# Patient Record
Sex: FEMALE | URBAN - METROPOLITAN AREA
[De-identification: names, ages, dates, MRNs, and addresses within clinical notes are randomized per-mention and may not be internally consistent; named-entity substitution may affect disease eponyms.]

---

## 2018-07-19 ENCOUNTER — APPOINTMENT (RX ONLY)
Dept: URBAN - METROPOLITAN AREA OTHER 12 | Facility: OTHER | Age: 58
Setting detail: DERMATOLOGY
End: 2018-07-19

## 2018-07-19 DIAGNOSIS — L21.8 OTHER SEBORRHEIC DERMATITIS: ICD-10-CM

## 2018-07-19 PROCEDURE — ? TREATMENT REGIMEN

## 2018-07-19 PROCEDURE — ? PRESCRIPTION

## 2018-07-19 PROCEDURE — 99212 OFFICE O/P EST SF 10 MIN: CPT

## 2018-07-19 RX ORDER — PIMECROLIMUS 10 MG/G
CREAM TOPICAL
Qty: 1 | Refills: 3 | Status: ERX | COMMUNITY
Start: 2018-07-19

## 2018-07-19 RX ADMIN — PIMECROLIMUS: 10 CREAM TOPICAL at 17:25

## 2018-07-19 NOTE — PROCEDURE: TREATMENT REGIMEN
Continue Regimen: Hydrocortisone cream until Elidel cream is received, then discontinue
Otc Regimen: Free & Clear products 2-3 x weekly and CeraVe cream BID
Plan: Advised to contact clinic for referral to [facility] if the rash that Dr. Zapien evaluated returns
Detail Level: Zone

## 2020-09-14 ENCOUNTER — IMPORTED ENCOUNTER (OUTPATIENT)
Dept: URBAN - METROPOLITAN AREA CLINIC 38 | Facility: CLINIC | Age: 60
End: 2020-09-14

## 2020-09-14 PROBLEM — H53.001 UNSPECIFIED AMBLYOPIA, RIGHT EYE: Noted: 2020-09-14

## 2020-09-14 PROBLEM — H52.4 PRESBYOPIA: Noted: 2020-09-14

## 2020-09-14 PROBLEM — H25.813 COMBINED FORMS OF AGE-RELATED CATARACT, BILATERAL: Noted: 2020-09-14

## 2020-09-14 PROBLEM — H50.10 UNSPECIFIED EXOTROPIA: Noted: 2020-09-14

## 2020-09-30 ENCOUNTER — IMPORTED ENCOUNTER (OUTPATIENT)
Dept: URBAN - METROPOLITAN AREA CLINIC 38 | Facility: CLINIC | Age: 60
End: 2020-09-30

## 2020-09-30 PROBLEM — H50.111 MONOCULAR EXOTROPIA, RIGHT EYE: Noted: 2020-09-30

## 2020-09-30 PROCEDURE — 92012 INTRM OPH EXAM EST PATIENT: CPT

## 2021-01-01 ENCOUNTER — APPOINTMENT (OUTPATIENT)
Dept: GENERAL RADIOLOGY | Age: 61
DRG: 871 | End: 2021-01-01
Attending: EMERGENCY MEDICINE
Payer: MEDICARE

## 2021-01-01 ENCOUNTER — APPOINTMENT (OUTPATIENT)
Dept: GENERAL RADIOLOGY | Age: 61
DRG: 871 | End: 2021-01-01
Attending: INTERNAL MEDICINE
Payer: MEDICARE

## 2021-01-01 ENCOUNTER — APPOINTMENT (OUTPATIENT)
Dept: CT IMAGING | Age: 61
DRG: 871 | End: 2021-01-01
Attending: EMERGENCY MEDICINE
Payer: MEDICARE

## 2021-01-01 ENCOUNTER — APPOINTMENT (OUTPATIENT)
Dept: NON INVASIVE DIAGNOSTICS | Age: 61
DRG: 871 | End: 2021-01-01
Attending: INTERNAL MEDICINE
Payer: MEDICARE

## 2021-01-01 ENCOUNTER — HOSPITAL ENCOUNTER (INPATIENT)
Age: 61
LOS: 8 days | DRG: 871 | End: 2021-07-22
Attending: EMERGENCY MEDICINE | Admitting: HOSPITALIST
Payer: MEDICARE

## 2021-01-01 VITALS
SYSTOLIC BLOOD PRESSURE: 149 MMHG | HEART RATE: 136 BPM | WEIGHT: 82 LBS | OXYGEN SATURATION: 94 % | TEMPERATURE: 97.3 F | DIASTOLIC BLOOD PRESSURE: 77 MMHG | RESPIRATION RATE: 24 BRPM | HEIGHT: 63 IN | BODY MASS INDEX: 14.53 KG/M2

## 2021-01-01 DIAGNOSIS — R56.9 SEIZURE (HCC): ICD-10-CM

## 2021-01-01 DIAGNOSIS — G40.901 STATUS EPILEPTICUS (HCC): ICD-10-CM

## 2021-01-01 DIAGNOSIS — J96.01 ACUTE RESPIRATORY FAILURE WITH HYPOXIA (HCC): ICD-10-CM

## 2021-01-01 DIAGNOSIS — R65.21 SEPTIC SHOCK (HCC): Primary | ICD-10-CM

## 2021-01-01 DIAGNOSIS — J18.9 PNEUMONIA OF BOTH LUNGS DUE TO INFECTIOUS ORGANISM, UNSPECIFIED PART OF LUNG: ICD-10-CM

## 2021-01-01 DIAGNOSIS — A41.9 SEPTIC SHOCK (HCC): Primary | ICD-10-CM

## 2021-01-01 DIAGNOSIS — R53.81 DEBILITY: ICD-10-CM

## 2021-01-01 DIAGNOSIS — E87.20 LACTIC ACIDOSIS: ICD-10-CM

## 2021-01-01 DIAGNOSIS — Z71.89 GOALS OF CARE, COUNSELING/DISCUSSION: ICD-10-CM

## 2021-01-01 DIAGNOSIS — A41.9 SEPSIS, DUE TO UNSPECIFIED ORGANISM, UNSPECIFIED WHETHER ACUTE ORGAN DYSFUNCTION PRESENT (HCC): ICD-10-CM

## 2021-01-01 LAB
ALBUMIN SERPL-MCNC: 2.7 G/DL (ref 3.4–5)
ALBUMIN/GLOB SERPL: 0.4 {RATIO} (ref 0.8–1.7)
ALP SERPL-CCNC: 115 U/L (ref 45–117)
ALT SERPL-CCNC: 12 U/L (ref 13–56)
ANION GAP BLD CALC-SCNC: 13 MMOL/L (ref 10–20)
ANION GAP SERPL CALC-SCNC: 10 MMOL/L (ref 3–18)
ANION GAP SERPL CALC-SCNC: 11 MMOL/L (ref 3–18)
ANION GAP SERPL CALC-SCNC: 12 MMOL/L (ref 3–18)
ANION GAP SERPL CALC-SCNC: 5 MMOL/L (ref 3–18)
ANION GAP SERPL CALC-SCNC: 6 MMOL/L (ref 3–18)
ANION GAP SERPL CALC-SCNC: 7 MMOL/L (ref 3–18)
ANION GAP SERPL CALC-SCNC: 9 MMOL/L (ref 3–18)
AST SERPL-CCNC: 26 U/L (ref 10–38)
ATRIAL RATE: 147 BPM
ATRIAL RATE: 162 BPM
ATRIAL RATE: 185 BPM
BACTERIA SPEC CULT: NORMAL
BASE DEFICIT BLD-SCNC: 0.2 MMOL/L
BASOPHILS # BLD: 0 K/UL (ref 0–0.1)
BASOPHILS NFR BLD: 0 % (ref 0–2)
BILIRUB SERPL-MCNC: 0.6 MG/DL (ref 0.2–1)
BNP SERPL-MCNC: 6289 PG/ML (ref 0–900)
BUN SERPL-MCNC: 12 MG/DL (ref 7–18)
BUN SERPL-MCNC: 14 MG/DL (ref 7–18)
BUN SERPL-MCNC: 15 MG/DL (ref 7–18)
BUN SERPL-MCNC: 18 MG/DL (ref 7–18)
BUN SERPL-MCNC: 6 MG/DL (ref 7–18)
BUN SERPL-MCNC: 7 MG/DL (ref 7–18)
BUN SERPL-MCNC: 7 MG/DL (ref 7–18)
BUN SERPL-MCNC: 8 MG/DL (ref 7–18)
BUN SERPL-MCNC: 8 MG/DL (ref 7–18)
BUN/CREAT SERPL: 21 (ref 12–20)
BUN/CREAT SERPL: 26 (ref 12–20)
BUN/CREAT SERPL: 29 (ref 12–20)
BUN/CREAT SERPL: 30 (ref 12–20)
BUN/CREAT SERPL: 35 (ref 12–20)
BUN/CREAT SERPL: 35 (ref 12–20)
BUN/CREAT SERPL: 43 (ref 12–20)
BUN/CREAT SERPL: 50 (ref 12–20)
BUN/CREAT SERPL: 56 (ref 12–20)
CA-I BLD-MCNC: 1.24 MMOL/L (ref 1.12–1.32)
CA-I SERPL-SCNC: 1.17 MMOL/L (ref 1.12–1.32)
CA-I SERPL-SCNC: 1.19 MMOL/L (ref 1.12–1.32)
CALCIUM SERPL-MCNC: 10.3 MG/DL (ref 8.5–10.1)
CALCIUM SERPL-MCNC: 7.8 MG/DL (ref 8.5–10.1)
CALCIUM SERPL-MCNC: 7.8 MG/DL (ref 8.5–10.1)
CALCIUM SERPL-MCNC: 7.9 MG/DL (ref 8.5–10.1)
CALCIUM SERPL-MCNC: 8.2 MG/DL (ref 8.5–10.1)
CALCIUM SERPL-MCNC: 8.2 MG/DL (ref 8.5–10.1)
CALCIUM SERPL-MCNC: 8.3 MG/DL (ref 8.5–10.1)
CALCIUM SERPL-MCNC: 8.3 MG/DL (ref 8.5–10.1)
CALCIUM SERPL-MCNC: 8.9 MG/DL (ref 8.5–10.1)
CALCULATED P AXIS, ECG09: 67 DEGREES
CALCULATED P AXIS, ECG09: 67 DEGREES
CALCULATED R AXIS, ECG10: -103 DEGREES
CALCULATED R AXIS, ECG10: -117 DEGREES
CALCULATED R AXIS, ECG10: -120 DEGREES
CALCULATED T AXIS, ECG11: 64 DEGREES
CALCULATED T AXIS, ECG11: 72 DEGREES
CALCULATED T AXIS, ECG11: 88 DEGREES
CHLORIDE BLD-SCNC: 93 MMOL/L (ref 98–107)
CHLORIDE SERPL-SCNC: 101 MMOL/L (ref 100–111)
CHLORIDE SERPL-SCNC: 102 MMOL/L (ref 100–111)
CHLORIDE SERPL-SCNC: 104 MMOL/L (ref 100–111)
CHLORIDE SERPL-SCNC: 106 MMOL/L (ref 100–111)
CHLORIDE SERPL-SCNC: 106 MMOL/L (ref 100–111)
CHLORIDE SERPL-SCNC: 108 MMOL/L (ref 100–111)
CHLORIDE SERPL-SCNC: 111 MMOL/L (ref 100–111)
CHLORIDE SERPL-SCNC: 94 MMOL/L (ref 100–111)
CHLORIDE SERPL-SCNC: 99 MMOL/L (ref 100–111)
CK MB CFR SERPL CALC: 4 % (ref 0–4)
CK MB CFR SERPL CALC: 5.5 % (ref 0–4)
CK MB CFR SERPL CALC: 7.3 % (ref 0–4)
CK MB SERPL-MCNC: 2.6 NG/ML (ref 5–25)
CK MB SERPL-MCNC: 2.7 NG/ML (ref 5–25)
CK MB SERPL-MCNC: 3.1 NG/ML (ref 5–25)
CK SERPL-CCNC: 37 U/L (ref 26–192)
CK SERPL-CCNC: 47 U/L (ref 26–192)
CK SERPL-CCNC: 78 U/L (ref 26–192)
CO2 BLD-SCNC: 27 MMOL/L (ref 19–24)
CO2 SERPL-SCNC: 22 MMOL/L (ref 21–32)
CO2 SERPL-SCNC: 22 MMOL/L (ref 21–32)
CO2 SERPL-SCNC: 23 MMOL/L (ref 21–32)
CO2 SERPL-SCNC: 23 MMOL/L (ref 21–32)
CO2 SERPL-SCNC: 24 MMOL/L (ref 21–32)
CO2 SERPL-SCNC: 24 MMOL/L (ref 21–32)
CO2 SERPL-SCNC: 26 MMOL/L (ref 21–32)
CO2 SERPL-SCNC: 28 MMOL/L (ref 21–32)
CO2 SERPL-SCNC: 29 MMOL/L (ref 21–32)
COVID-19 RAPID TEST, COVR: NOT DETECTED
CREAT BLD-MCNC: 0.73 MG/DL (ref 0.6–1.3)
CREAT SERPL-MCNC: 0.17 MG/DL (ref 0.6–1.3)
CREAT SERPL-MCNC: 0.23 MG/DL (ref 0.6–1.3)
CREAT SERPL-MCNC: 0.24 MG/DL (ref 0.6–1.3)
CREAT SERPL-MCNC: 0.25 MG/DL (ref 0.6–1.3)
CREAT SERPL-MCNC: 0.27 MG/DL (ref 0.6–1.3)
CREAT SERPL-MCNC: 0.28 MG/DL (ref 0.6–1.3)
CREAT SERPL-MCNC: 0.3 MG/DL (ref 0.6–1.3)
CREAT SERPL-MCNC: 0.34 MG/DL (ref 0.6–1.3)
CREAT SERPL-MCNC: 0.68 MG/DL (ref 0.6–1.3)
DATE LAST DOSE: ABNORMAL
DIAGNOSIS, 93000: NORMAL
DIFFERENTIAL METHOD BLD: ABNORMAL
ECHO AV ANNULUS DIAM: 3.32 CM
ECHO AV AREA PEAK VELOCITY: 2.54 CM2
ECHO AV AREA VTI: 2.17 CM2
ECHO AV AREA/BSA PEAK VELOCITY: 1.9 CM2/M2
ECHO AV AREA/BSA VTI: 1.7 CM2/M2
ECHO AV MEAN GRADIENT: 1.87 MMHG
ECHO AV PEAK GRADIENT: 3.44 MMHG
ECHO AV PEAK VELOCITY: 93 CM/S
ECHO AV VTI: 15.24 CM
ECHO LA VOL 4C: 30.09 ML (ref 22–52)
ECHO LV E' LATERAL VELOCITY: 8 CM/S
ECHO LV E' SEPTAL VELOCITY: 6 CM/S
ECHO LV EDV A2C: 38 ML
ECHO LV EDV A4C: 57 ML
ECHO LV EDV BP: 47.33 ML (ref 56–104)
ECHO LV EJECTION FRACTION A2C: 55 %
ECHO LV EJECTION FRACTION A4C: 60 %
ECHO LV EJECTION FRACTION BIPLANE: 57.9 % (ref 55–100)
ECHO LV ESV A2C: 16.99 ML
ECHO LV ESV A4C: 23.08 ML
ECHO LV ESV BP: 19.95 ML (ref 19–49)
ECHO LV INTERNAL DIMENSION DIASTOLIC: 3.7 CM (ref 3.9–5.3)
ECHO LV INTERNAL DIMENSION SYSTOLIC: 2.83 CM
ECHO LV IVSD: 0.92 CM (ref 0.6–0.9)
ECHO LV MASS 2D: 98.4 G (ref 67–162)
ECHO LV MASS INDEX 2D: 75.1 G/M2 (ref 43–95)
ECHO LV POSTERIOR WALL DIASTOLIC: 0.9 CM (ref 0.6–0.9)
ECHO LVOT CARDIAC OUTPUT: 3.7 L/MIN
ECHO LVOT DIAM: 2.01 CM
ECHO LVOT PEAK GRADIENT: 2.2 MMHG
ECHO LVOT PEAK VELOCITY: 74 CM/S
ECHO LVOT SV: 21 ML
ECHO LVOT SV: 33 ML
ECHO LVOT VTI: 10.41 CM
ECHO MV A VELOCITY: 74 CM/S
ECHO MV AREA PHT: 5.16 CM2
ECHO MV E DECELERATION TIME (DT): 147.08 MS
ECHO MV E VELOCITY: 55 CM/S
ECHO MV E/A RATIO: 0.74
ECHO MV E/E' LATERAL: 6.88
ECHO MV E/E' RATIO (AVERAGED): 8.02
ECHO MV E/E' SEPTAL: 9.17
ECHO MV PRESSURE HALF TIME (PHT): 42.65 MS
ECHO RA AREA 4C: 7.72 CM2
ECHO RV INTERNAL DIMENSION: 3.05 CM
ECHO RV TAPSE: 1.28 CM (ref 1.5–2)
EOSINOPHIL # BLD: 0 K/UL (ref 0–0.4)
EOSINOPHIL # BLD: 0.4 K/UL (ref 0–0.4)
EOSINOPHIL # BLD: 0.4 K/UL (ref 0–0.4)
EOSINOPHIL # BLD: 0.6 K/UL (ref 0–0.4)
EOSINOPHIL NFR BLD: 0 % (ref 0–5)
EOSINOPHIL NFR BLD: 2 % (ref 0–5)
EOSINOPHIL NFR BLD: 2 % (ref 0–5)
EOSINOPHIL NFR BLD: 3 % (ref 0–5)
ERYTHROCYTE [DISTWIDTH] IN BLOOD BY AUTOMATED COUNT: 15.7 % (ref 11.6–14.5)
ERYTHROCYTE [DISTWIDTH] IN BLOOD BY AUTOMATED COUNT: 15.9 % (ref 11.6–14.5)
ERYTHROCYTE [DISTWIDTH] IN BLOOD BY AUTOMATED COUNT: 15.9 % (ref 11.6–14.5)
ERYTHROCYTE [DISTWIDTH] IN BLOOD BY AUTOMATED COUNT: 16 % (ref 11.6–14.5)
ERYTHROCYTE [DISTWIDTH] IN BLOOD BY AUTOMATED COUNT: 16.1 % (ref 11.6–14.5)
ERYTHROCYTE [DISTWIDTH] IN BLOOD BY AUTOMATED COUNT: 16.7 % (ref 11.6–14.5)
ERYTHROCYTE [DISTWIDTH] IN BLOOD BY AUTOMATED COUNT: 16.9 % (ref 11.6–14.5)
ERYTHROCYTE [DISTWIDTH] IN BLOOD BY AUTOMATED COUNT: 17.2 % (ref 11.6–14.5)
EST. AVERAGE GLUCOSE BLD GHB EST-MCNC: 131 MG/DL
GLOBULIN SER CALC-MCNC: 6.8 G/DL (ref 2–4)
GLUCOSE BLD STRIP.AUTO-MCNC: 105 MG/DL (ref 70–110)
GLUCOSE BLD STRIP.AUTO-MCNC: 109 MG/DL (ref 70–110)
GLUCOSE BLD STRIP.AUTO-MCNC: 110 MG/DL (ref 70–110)
GLUCOSE BLD STRIP.AUTO-MCNC: 114 MG/DL (ref 70–110)
GLUCOSE BLD STRIP.AUTO-MCNC: 114 MG/DL (ref 70–110)
GLUCOSE BLD STRIP.AUTO-MCNC: 116 MG/DL (ref 70–110)
GLUCOSE BLD STRIP.AUTO-MCNC: 117 MG/DL (ref 70–110)
GLUCOSE BLD STRIP.AUTO-MCNC: 125 MG/DL (ref 70–110)
GLUCOSE BLD STRIP.AUTO-MCNC: 129 MG/DL (ref 70–110)
GLUCOSE BLD STRIP.AUTO-MCNC: 130 MG/DL (ref 70–110)
GLUCOSE BLD STRIP.AUTO-MCNC: 135 MG/DL (ref 70–110)
GLUCOSE BLD STRIP.AUTO-MCNC: 147 MG/DL (ref 70–110)
GLUCOSE BLD STRIP.AUTO-MCNC: 151 MG/DL (ref 70–110)
GLUCOSE BLD STRIP.AUTO-MCNC: 158 MG/DL (ref 70–110)
GLUCOSE BLD STRIP.AUTO-MCNC: 161 MG/DL (ref 70–110)
GLUCOSE BLD STRIP.AUTO-MCNC: 165 MG/DL (ref 70–110)
GLUCOSE BLD STRIP.AUTO-MCNC: 166 MG/DL (ref 70–110)
GLUCOSE BLD STRIP.AUTO-MCNC: 172 MG/DL (ref 70–110)
GLUCOSE BLD STRIP.AUTO-MCNC: 187 MG/DL (ref 70–110)
GLUCOSE BLD STRIP.AUTO-MCNC: 191 MG/DL (ref 70–110)
GLUCOSE BLD STRIP.AUTO-MCNC: 197 MG/DL (ref 70–110)
GLUCOSE BLD STRIP.AUTO-MCNC: 233 MG/DL (ref 70–110)
GLUCOSE BLD-MCNC: 222 MG/DL (ref 65–100)
GLUCOSE SERPL-MCNC: 101 MG/DL (ref 74–99)
GLUCOSE SERPL-MCNC: 113 MG/DL (ref 74–99)
GLUCOSE SERPL-MCNC: 118 MG/DL (ref 74–99)
GLUCOSE SERPL-MCNC: 150 MG/DL (ref 74–99)
GLUCOSE SERPL-MCNC: 157 MG/DL (ref 74–99)
GLUCOSE SERPL-MCNC: 170 MG/DL (ref 74–99)
GLUCOSE SERPL-MCNC: 176 MG/DL (ref 74–99)
GLUCOSE SERPL-MCNC: 182 MG/DL (ref 74–99)
GLUCOSE SERPL-MCNC: 99 MG/DL (ref 74–99)
HBA1C MFR BLD: 6.2 % (ref 4.2–5.6)
HCO3 BLD-SCNC: 26.1 MMOL/L (ref 22–26)
HCT VFR BLD AUTO: 25 % (ref 35–45)
HCT VFR BLD AUTO: 28.6 % (ref 35–45)
HCT VFR BLD AUTO: 29.4 % (ref 35–45)
HCT VFR BLD AUTO: 29.6 % (ref 35–45)
HCT VFR BLD AUTO: 30.9 % (ref 35–45)
HCT VFR BLD AUTO: 33.4 % (ref 35–45)
HCT VFR BLD AUTO: 33.6 % (ref 35–45)
HCT VFR BLD AUTO: 42 % (ref 35–45)
HGB BLD-MCNC: 10.1 G/DL (ref 12–16)
HGB BLD-MCNC: 10.4 G/DL (ref 12–16)
HGB BLD-MCNC: 12.9 G/DL (ref 12–16)
HGB BLD-MCNC: 7.9 G/DL (ref 12–16)
HGB BLD-MCNC: 8.6 G/DL (ref 12–16)
HGB BLD-MCNC: 9.3 G/DL (ref 12–16)
HGB BLD-MCNC: 9.4 G/DL (ref 12–16)
HGB BLD-MCNC: 9.6 G/DL (ref 12–16)
INR PPP: 1.3 (ref 0.8–1.2)
LACTATE BLD-SCNC: 11.57 MMOL/L (ref 0.4–2)
LACTATE BLD-SCNC: 4.89 MMOL/L (ref 0.4–2)
LACTATE SERPL-SCNC: 1 MMOL/L (ref 0.4–2)
LACTATE SERPL-SCNC: 1.4 MMOL/L (ref 0.4–2)
LACTATE SERPL-SCNC: 3.5 MMOL/L (ref 0.4–2)
LVOT MG: 1.09 MMHG
LYMPHOCYTES # BLD: 0.4 K/UL (ref 0.9–3.6)
LYMPHOCYTES # BLD: 0.5 K/UL (ref 0.9–3.6)
LYMPHOCYTES # BLD: 0.6 K/UL (ref 0.9–3.6)
LYMPHOCYTES # BLD: 0.8 K/UL (ref 0.9–3.6)
LYMPHOCYTES # BLD: 1 K/UL (ref 0.9–3.6)
LYMPHOCYTES # BLD: 1.5 K/UL (ref 0.9–3.6)
LYMPHOCYTES # BLD: 1.8 K/UL (ref 0.9–3.6)
LYMPHOCYTES NFR BLD: 2 % (ref 21–52)
LYMPHOCYTES NFR BLD: 2 % (ref 21–52)
LYMPHOCYTES NFR BLD: 3 % (ref 21–52)
LYMPHOCYTES NFR BLD: 4 % (ref 21–52)
LYMPHOCYTES NFR BLD: 5 % (ref 21–52)
LYMPHOCYTES NFR BLD: 9 % (ref 21–52)
LYMPHOCYTES NFR BLD: 9 % (ref 21–52)
MAGNESIUM SERPL-MCNC: 1.8 MG/DL (ref 1.6–2.6)
MAGNESIUM SERPL-MCNC: 1.9 MG/DL (ref 1.6–2.6)
MAGNESIUM SERPL-MCNC: 2 MG/DL (ref 1.6–2.6)
MAGNESIUM SERPL-MCNC: 2 MG/DL (ref 1.6–2.6)
MAGNESIUM SERPL-MCNC: 2.1 MG/DL (ref 1.6–2.6)
MAGNESIUM SERPL-MCNC: 2.3 MG/DL (ref 1.6–2.6)
MCH RBC QN AUTO: 27 PG (ref 24–34)
MCH RBC QN AUTO: 27.2 PG (ref 24–34)
MCH RBC QN AUTO: 27.7 PG (ref 24–34)
MCH RBC QN AUTO: 27.8 PG (ref 24–34)
MCH RBC QN AUTO: 27.9 PG (ref 24–34)
MCH RBC QN AUTO: 27.9 PG (ref 24–34)
MCH RBC QN AUTO: 28.2 PG (ref 24–34)
MCH RBC QN AUTO: 28.4 PG (ref 24–34)
MCHC RBC AUTO-ENTMCNC: 30.1 G/DL (ref 31–37)
MCHC RBC AUTO-ENTMCNC: 30.2 G/DL (ref 31–37)
MCHC RBC AUTO-ENTMCNC: 30.7 G/DL (ref 31–37)
MCHC RBC AUTO-ENTMCNC: 31 G/DL (ref 31–37)
MCHC RBC AUTO-ENTMCNC: 31.1 G/DL (ref 31–37)
MCHC RBC AUTO-ENTMCNC: 31.4 G/DL (ref 31–37)
MCHC RBC AUTO-ENTMCNC: 31.6 G/DL (ref 31–37)
MCHC RBC AUTO-ENTMCNC: 32 G/DL (ref 31–37)
MCV RBC AUTO: 88.3 FL (ref 74–97)
MCV RBC AUTO: 88.8 FL (ref 74–97)
MCV RBC AUTO: 89.6 FL (ref 74–97)
MCV RBC AUTO: 89.7 FL (ref 74–97)
MCV RBC AUTO: 89.9 FL (ref 74–97)
MCV RBC AUTO: 90 FL (ref 74–97)
MCV RBC AUTO: 90.1 FL (ref 74–97)
MCV RBC AUTO: 90.3 FL (ref 74–97)
MONOCYTES # BLD: 0 K/UL (ref 0.05–1.2)
MONOCYTES # BLD: 0.2 K/UL (ref 0.05–1.2)
MONOCYTES # BLD: 0.2 K/UL (ref 0.05–1.2)
MONOCYTES # BLD: 0.5 K/UL (ref 0.05–1.2)
MONOCYTES # BLD: 0.6 K/UL (ref 0.05–1.2)
MONOCYTES NFR BLD: 0 % (ref 3–10)
MONOCYTES NFR BLD: 1 % (ref 3–10)
MONOCYTES NFR BLD: 1 % (ref 3–10)
MONOCYTES NFR BLD: 3 % (ref 3–10)
NEUTS BAND NFR BLD MANUAL: 1 % (ref 0–5)
NEUTS BAND NFR BLD MANUAL: 11 % (ref 0–5)
NEUTS BAND NFR BLD MANUAL: 14 % (ref 0–5)
NEUTS BAND NFR BLD MANUAL: 23 % (ref 0–5)
NEUTS BAND NFR BLD MANUAL: 6 % (ref 0–5)
NEUTS BAND NFR BLD MANUAL: 6 % (ref 0–5)
NEUTS BAND NFR BLD MANUAL: 7 % (ref 0–5)
NEUTS SEG # BLD: 15.1 K/UL (ref 1.8–8)
NEUTS SEG # BLD: 16.8 K/UL (ref 1.8–8)
NEUTS SEG # BLD: 17.1 K/UL (ref 1.8–8)
NEUTS SEG # BLD: 18.8 K/UL (ref 1.8–8)
NEUTS SEG # BLD: 18.9 K/UL (ref 1.8–8)
NEUTS SEG # BLD: 21.6 K/UL (ref 1.8–8)
NEUTS SEG # BLD: 24.1 K/UL (ref 1.8–8)
NEUTS SEG NFR BLD: 69 % (ref 40–73)
NEUTS SEG NFR BLD: 74 % (ref 40–73)
NEUTS SEG NFR BLD: 84 % (ref 40–73)
NEUTS SEG NFR BLD: 85 % (ref 40–73)
NEUTS SEG NFR BLD: 86 % (ref 40–73)
NEUTS SEG NFR BLD: 88 % (ref 40–73)
NEUTS SEG NFR BLD: 91 % (ref 40–73)
P-R INTERVAL, ECG05: 104 MS
P-R INTERVAL, ECG05: 96 MS
PCO2 BLDV: 46.9 MMHG (ref 41–51)
PH BLDV: 7.35 [PH] (ref 7.32–7.42)
PHOSPHATE SERPL-MCNC: 1.2 MG/DL (ref 2.5–4.9)
PHOSPHATE SERPL-MCNC: 1.3 MG/DL (ref 2.5–4.9)
PHOSPHATE SERPL-MCNC: 1.4 MG/DL (ref 2.5–4.9)
PHOSPHATE SERPL-MCNC: 1.5 MG/DL (ref 2.5–4.9)
PHOSPHATE SERPL-MCNC: 1.5 MG/DL (ref 2.5–4.9)
PHOSPHATE SERPL-MCNC: 1.6 MG/DL (ref 2.5–4.9)
PHOSPHATE SERPL-MCNC: 1.7 MG/DL (ref 2.5–4.9)
PHOSPHATE SERPL-MCNC: 1.8 MG/DL (ref 2.5–4.9)
PHOSPHATE SERPL-MCNC: 2 MG/DL (ref 2.5–4.9)
PHOSPHATE SERPL-MCNC: 2.8 MG/DL (ref 2.5–4.9)
PHOSPHATE SERPL-MCNC: 3.7 MG/DL (ref 2.5–4.9)
PLATELET # BLD AUTO: 210 K/UL (ref 135–420)
PLATELET # BLD AUTO: 323 K/UL (ref 135–420)
PLATELET # BLD AUTO: 344 K/UL (ref 135–420)
PLATELET # BLD AUTO: 364 K/UL (ref 135–420)
PLATELET # BLD AUTO: 401 K/UL (ref 135–420)
PLATELET # BLD AUTO: 421 K/UL (ref 135–420)
PLATELET # BLD AUTO: 451 K/UL (ref 135–420)
PLATELET # BLD AUTO: 588 K/UL (ref 135–420)
PLATELET COMMENTS,PCOM: ABNORMAL
PMV BLD AUTO: 10.2 FL (ref 9.2–11.8)
PMV BLD AUTO: 10.8 FL (ref 9.2–11.8)
PMV BLD AUTO: 9 FL (ref 9.2–11.8)
PMV BLD AUTO: 9.1 FL (ref 9.2–11.8)
PMV BLD AUTO: 9.3 FL (ref 9.2–11.8)
PMV BLD AUTO: 9.9 FL (ref 9.2–11.8)
PO2 BLDV: 29 MMHG (ref 25–40)
POTASSIUM BLD-SCNC: 5.6 MMOL/L (ref 3.5–5.1)
POTASSIUM SERPL-SCNC: 2.6 MMOL/L (ref 3.5–5.5)
POTASSIUM SERPL-SCNC: 2.9 MMOL/L (ref 3.5–5.5)
POTASSIUM SERPL-SCNC: 2.9 MMOL/L (ref 3.5–5.5)
POTASSIUM SERPL-SCNC: 3.3 MMOL/L (ref 3.5–5.5)
POTASSIUM SERPL-SCNC: 3.4 MMOL/L (ref 3.5–5.5)
POTASSIUM SERPL-SCNC: 3.5 MMOL/L (ref 3.5–5.5)
POTASSIUM SERPL-SCNC: 3.5 MMOL/L (ref 3.5–5.5)
POTASSIUM SERPL-SCNC: 3.6 MMOL/L (ref 3.5–5.5)
POTASSIUM SERPL-SCNC: 3.7 MMOL/L (ref 3.5–5.5)
POTASSIUM SERPL-SCNC: 3.8 MMOL/L (ref 3.5–5.5)
POTASSIUM SERPL-SCNC: 4.4 MMOL/L (ref 3.5–5.5)
POTASSIUM SERPL-SCNC: 4.6 MMOL/L (ref 3.5–5.5)
POTASSIUM SERPL-SCNC: 4.7 MMOL/L (ref 3.5–5.5)
PROT SERPL-MCNC: 9.5 G/DL (ref 6.4–8.2)
PROTHROMBIN TIME: 16 SEC (ref 11.5–15.2)
Q-T INTERVAL, ECG07: 270 MS
Q-T INTERVAL, ECG07: 312 MS
Q-T INTERVAL, ECG07: 348 MS
QRS DURATION, ECG06: 68 MS
QRS DURATION, ECG06: 72 MS
QRS DURATION, ECG06: 80 MS
QTC CALCULATION (BEZET), ECG08: 473 MS
QTC CALCULATION (BEZET), ECG08: 512 MS
QTC CALCULATION (BEZET), ECG08: 544 MS
RBC # BLD AUTO: 2.83 M/UL (ref 4.2–5.3)
RBC # BLD AUTO: 3.18 M/UL (ref 4.2–5.3)
RBC # BLD AUTO: 3.3 M/UL (ref 4.2–5.3)
RBC # BLD AUTO: 3.31 M/UL (ref 4.2–5.3)
RBC # BLD AUTO: 3.45 M/UL (ref 4.2–5.3)
RBC # BLD AUTO: 3.71 M/UL (ref 4.2–5.3)
RBC # BLD AUTO: 3.73 M/UL (ref 4.2–5.3)
RBC # BLD AUTO: 4.65 M/UL (ref 4.2–5.3)
RBC MORPH BLD: ABNORMAL
REPORTED DOSE,DOSE: ABNORMAL UNITS
REPORTED DOSE/TIME,TMG: 1629
SAO2 % BLD: 52 %
SERVICE CMNT-IMP: ABNORMAL
SERVICE CMNT-IMP: NORMAL
SODIUM BLD-SCNC: 132 MMOL/L (ref 136–145)
SODIUM SERPL-SCNC: 129 MMOL/L (ref 136–145)
SODIUM SERPL-SCNC: 133 MMOL/L (ref 136–145)
SODIUM SERPL-SCNC: 136 MMOL/L (ref 136–145)
SODIUM SERPL-SCNC: 137 MMOL/L (ref 136–145)
SODIUM SERPL-SCNC: 137 MMOL/L (ref 136–145)
SODIUM SERPL-SCNC: 140 MMOL/L (ref 136–145)
SOURCE, COVRS: NORMAL
SPECIMEN SITE: ABNORMAL
TROPONIN I SERPL-MCNC: 0.59 NG/ML (ref 0–0.04)
TROPONIN I SERPL-MCNC: 1.16 NG/ML (ref 0–0.04)
TROPONIN I SERPL-MCNC: 1.16 NG/ML (ref 0–0.04)
TROPONIN I SERPL-MCNC: 1.64 NG/ML (ref 0–0.04)
VANCOMYCIN SERPL-MCNC: 1.3 UG/ML (ref 5–40)
VANCOMYCIN TROUGH SERPL-MCNC: 1.1 UG/ML (ref 10–20)
VANCOMYCIN TROUGH SERPL-MCNC: 9 UG/ML (ref 10–20)
VENTRICULAR RATE, ECG03: 147 BPM
VENTRICULAR RATE, ECG03: 162 BPM
VENTRICULAR RATE, ECG03: 185 BPM
WBC # BLD AUTO: 17.1 K/UL (ref 4.6–13.2)
WBC # BLD AUTO: 18.9 K/UL (ref 4.6–13.2)
WBC # BLD AUTO: 19.6 K/UL (ref 4.6–13.2)
WBC # BLD AUTO: 20 K/UL (ref 4.6–13.2)
WBC # BLD AUTO: 20.5 K/UL (ref 4.6–13.2)
WBC # BLD AUTO: 20.8 K/UL (ref 4.6–13.2)
WBC # BLD AUTO: 22.2 K/UL (ref 4.6–13.2)
WBC # BLD AUTO: 24.8 K/UL (ref 4.6–13.2)
WBC MORPH BLD: ABNORMAL

## 2021-01-01 PROCEDURE — 74011000250 HC RX REV CODE- 250: Performed by: HOSPITALIST

## 2021-01-01 PROCEDURE — 74011000258 HC RX REV CODE- 258: Performed by: HOSPITALIST

## 2021-01-01 PROCEDURE — 83735 ASSAY OF MAGNESIUM: CPT

## 2021-01-01 PROCEDURE — 74011250636 HC RX REV CODE- 250/636: Performed by: FAMILY MEDICINE

## 2021-01-01 PROCEDURE — 36415 COLL VENOUS BLD VENIPUNCTURE: CPT

## 2021-01-01 PROCEDURE — 65610000006 HC RM INTENSIVE CARE

## 2021-01-01 PROCEDURE — 74011000250 HC RX REV CODE- 250: Performed by: INTERNAL MEDICINE

## 2021-01-01 PROCEDURE — 85025 COMPLETE CBC W/AUTO DIFF WBC: CPT

## 2021-01-01 PROCEDURE — 76450000000

## 2021-01-01 PROCEDURE — 74011000258 HC RX REV CODE- 258: Performed by: INTERNAL MEDICINE

## 2021-01-01 PROCEDURE — 70450 CT HEAD/BRAIN W/O DYE: CPT

## 2021-01-01 PROCEDURE — 80048 BASIC METABOLIC PNL TOTAL CA: CPT

## 2021-01-01 PROCEDURE — 74011250636 HC RX REV CODE- 250/636: Performed by: HOSPITALIST

## 2021-01-01 PROCEDURE — 74011250636 HC RX REV CODE- 250/636: Performed by: NURSE PRACTITIONER

## 2021-01-01 PROCEDURE — 74011250636 HC RX REV CODE- 250/636: Performed by: INTERNAL MEDICINE

## 2021-01-01 PROCEDURE — 84132 ASSAY OF SERUM POTASSIUM: CPT

## 2021-01-01 PROCEDURE — 80202 ASSAY OF VANCOMYCIN: CPT

## 2021-01-01 PROCEDURE — 96366 THER/PROPH/DIAG IV INF ADDON: CPT

## 2021-01-01 PROCEDURE — 99285 EMERGENCY DEPT VISIT HI MDM: CPT

## 2021-01-01 PROCEDURE — 82330 ASSAY OF CALCIUM: CPT

## 2021-01-01 PROCEDURE — 87635 SARS-COV-2 COVID-19 AMP PRB: CPT

## 2021-01-01 PROCEDURE — 87040 BLOOD CULTURE FOR BACTERIA: CPT

## 2021-01-01 PROCEDURE — C9113 INJ PANTOPRAZOLE SODIUM, VIA: HCPCS | Performed by: HOSPITALIST

## 2021-01-01 PROCEDURE — 2709999900 HC NON-CHARGEABLE SUPPLY

## 2021-01-01 PROCEDURE — 87086 URINE CULTURE/COLONY COUNT: CPT

## 2021-01-01 PROCEDURE — C8929 TTE W OR WO FOL WCON,DOPPLER: HCPCS

## 2021-01-01 PROCEDURE — 74011636637 HC RX REV CODE- 636/637: Performed by: INTERNAL MEDICINE

## 2021-01-01 PROCEDURE — 74011250637 HC RX REV CODE- 250/637: Performed by: NURSE PRACTITIONER

## 2021-01-01 PROCEDURE — 65270000029 HC RM PRIVATE

## 2021-01-01 PROCEDURE — 82962 GLUCOSE BLOOD TEST: CPT

## 2021-01-01 PROCEDURE — 93005 ELECTROCARDIOGRAM TRACING: CPT

## 2021-01-01 PROCEDURE — 74011250637 HC RX REV CODE- 250/637: Performed by: EMERGENCY MEDICINE

## 2021-01-01 PROCEDURE — 84100 ASSAY OF PHOSPHORUS: CPT

## 2021-01-01 PROCEDURE — 83880 ASSAY OF NATRIURETIC PEPTIDE: CPT

## 2021-01-01 PROCEDURE — 99233 SBSQ HOSP IP/OBS HIGH 50: CPT | Performed by: NURSE PRACTITIONER

## 2021-01-01 PROCEDURE — 83036 HEMOGLOBIN GLYCOSYLATED A1C: CPT

## 2021-01-01 PROCEDURE — 74011250636 HC RX REV CODE- 250/636: Performed by: EMERGENCY MEDICINE

## 2021-01-01 PROCEDURE — 77010033711 HC HIGH FLOW OXYGEN

## 2021-01-01 PROCEDURE — 71045 X-RAY EXAM CHEST 1 VIEW: CPT

## 2021-01-01 PROCEDURE — 84295 ASSAY OF SERUM SODIUM: CPT

## 2021-01-01 PROCEDURE — 96365 THER/PROPH/DIAG IV INF INIT: CPT

## 2021-01-01 PROCEDURE — 82553 CREATINE MB FRACTION: CPT

## 2021-01-01 PROCEDURE — 77010033678 HC OXYGEN DAILY

## 2021-01-01 PROCEDURE — A4615 CANNULA NASAL: HCPCS

## 2021-01-01 PROCEDURE — 82550 ASSAY OF CK (CPK): CPT

## 2021-01-01 PROCEDURE — 74011250637 HC RX REV CODE- 250/637: Performed by: HOSPITALIST

## 2021-01-01 PROCEDURE — 74011000250 HC RX REV CODE- 250: Performed by: FAMILY MEDICINE

## 2021-01-01 PROCEDURE — 77030040393 HC DRSG OPTIFOAM GENT MDII -B

## 2021-01-01 PROCEDURE — 95816 EEG AWAKE AND DROWSY: CPT | Performed by: PSYCHIATRY & NEUROLOGY

## 2021-01-01 PROCEDURE — 96375 TX/PRO/DX INJ NEW DRUG ADDON: CPT

## 2021-01-01 PROCEDURE — 77030040392 HC DRSG OPTIFOAM MDII -A

## 2021-01-01 PROCEDURE — P9047 ALBUMIN (HUMAN), 25%, 50ML: HCPCS | Performed by: FAMILY MEDICINE

## 2021-01-01 PROCEDURE — 74011636637 HC RX REV CODE- 636/637: Performed by: FAMILY MEDICINE

## 2021-01-01 PROCEDURE — 83605 ASSAY OF LACTIC ACID: CPT

## 2021-01-01 PROCEDURE — 84484 ASSAY OF TROPONIN QUANT: CPT

## 2021-01-01 PROCEDURE — 85027 COMPLETE CBC AUTOMATED: CPT

## 2021-01-01 PROCEDURE — 74011000258 HC RX REV CODE- 258: Performed by: EMERGENCY MEDICINE

## 2021-01-01 PROCEDURE — 80053 COMPREHEN METABOLIC PANEL: CPT

## 2021-01-01 PROCEDURE — 99222 1ST HOSP IP/OBS MODERATE 55: CPT | Performed by: NURSE PRACTITIONER

## 2021-01-01 PROCEDURE — 74011000250 HC RX REV CODE- 250: Performed by: NURSE PRACTITIONER

## 2021-01-01 PROCEDURE — 96368 THER/DIAG CONCURRENT INF: CPT

## 2021-01-01 PROCEDURE — 74011000636 HC RX REV CODE- 636: Performed by: EMERGENCY MEDICINE

## 2021-01-01 PROCEDURE — 74011000250 HC RX REV CODE- 250: Performed by: EMERGENCY MEDICINE

## 2021-01-01 PROCEDURE — 96376 TX/PRO/DX INJ SAME DRUG ADON: CPT

## 2021-01-01 PROCEDURE — 74177 CT ABD & PELVIS W/CONTRAST: CPT

## 2021-01-01 PROCEDURE — 99356 PR PROLONGED SVC I/P OR OBS SETTING 1ST HOUR: CPT | Performed by: NURSE PRACTITIONER

## 2021-01-01 PROCEDURE — 85610 PROTHROMBIN TIME: CPT

## 2021-01-01 PROCEDURE — 99232 SBSQ HOSP IP/OBS MODERATE 35: CPT | Performed by: NURSE PRACTITIONER

## 2021-01-01 RX ORDER — INSULIN LISPRO 100 [IU]/ML
INJECTION, SOLUTION INTRAVENOUS; SUBCUTANEOUS
Status: DISCONTINUED | OUTPATIENT
Start: 2021-01-01 | End: 2021-01-01

## 2021-01-01 RX ORDER — CHOLECALCIFEROL (VITAMIN D3) 125 MCG
2000 CAPSULE ORAL DAILY
COMMUNITY

## 2021-01-01 RX ORDER — MAGNESIUM SULFATE 100 %
4 CRYSTALS MISCELLANEOUS AS NEEDED
Status: DISCONTINUED | OUTPATIENT
Start: 2021-01-01 | End: 2021-01-01

## 2021-01-01 RX ORDER — METOPROLOL TARTRATE 5 MG/5ML
2.5 INJECTION INTRAVENOUS EVERY 6 HOURS
Status: DISCONTINUED | OUTPATIENT
Start: 2021-01-01 | End: 2021-01-01

## 2021-01-01 RX ORDER — MAGNESIUM SULFATE 1 G/100ML
1 INJECTION INTRAVENOUS ONCE
Status: COMPLETED | OUTPATIENT
Start: 2021-01-01 | End: 2021-01-01

## 2021-01-01 RX ORDER — DILTIAZEM HYDROCHLORIDE 5 MG/ML
10 INJECTION INTRAVENOUS ONCE
Status: COMPLETED | OUTPATIENT
Start: 2021-01-01 | End: 2021-01-01

## 2021-01-01 RX ORDER — METOPROLOL TARTRATE 5 MG/5ML
1.25 INJECTION INTRAVENOUS EVERY 6 HOURS
Status: DISCONTINUED | OUTPATIENT
Start: 2021-01-01 | End: 2021-01-01

## 2021-01-01 RX ORDER — ACETAMINOPHEN 650 MG/1
650 SUPPOSITORY RECTAL ONCE
Status: COMPLETED | OUTPATIENT
Start: 2021-01-01 | End: 2021-01-01

## 2021-01-01 RX ORDER — LORAZEPAM 2 MG/ML
0.5 CONCENTRATE ORAL
Status: DISCONTINUED | OUTPATIENT
Start: 2021-01-01 | End: 2021-07-23 | Stop reason: HOSPADM

## 2021-01-01 RX ORDER — INSULIN LISPRO 100 [IU]/ML
INJECTION, SOLUTION INTRAVENOUS; SUBCUTANEOUS EVERY 6 HOURS
Status: DISCONTINUED | OUTPATIENT
Start: 2021-01-01 | End: 2021-01-01

## 2021-01-01 RX ORDER — POTASSIUM CHLORIDE 7.45 MG/ML
10 INJECTION INTRAVENOUS
Status: COMPLETED | OUTPATIENT
Start: 2021-01-01 | End: 2021-01-01

## 2021-01-01 RX ORDER — LORAZEPAM 2 MG/ML
1 INJECTION INTRAMUSCULAR ONCE
Status: COMPLETED | OUTPATIENT
Start: 2021-01-01 | End: 2021-01-01

## 2021-01-01 RX ORDER — DEXTROSE MONOHYDRATE AND SODIUM CHLORIDE 5; .45 G/100ML; G/100ML
30 INJECTION, SOLUTION INTRAVENOUS CONTINUOUS
Status: DISCONTINUED | OUTPATIENT
Start: 2021-01-01 | End: 2021-01-01

## 2021-01-01 RX ORDER — MORPHINE SULFATE 20 MG/ML
5 SOLUTION ORAL
Status: DISCONTINUED | OUTPATIENT
Start: 2021-01-01 | End: 2021-07-23 | Stop reason: HOSPADM

## 2021-01-01 RX ORDER — FENTANYL CITRATE 50 UG/ML
12.5 INJECTION, SOLUTION INTRAMUSCULAR; INTRAVENOUS
Status: DISCONTINUED | OUTPATIENT
Start: 2021-01-01 | End: 2021-01-01

## 2021-01-01 RX ORDER — MORPHINE SULFATE 2 MG/ML
1 INJECTION, SOLUTION INTRAMUSCULAR; INTRAVENOUS
Status: DISCONTINUED | OUTPATIENT
Start: 2021-01-01 | End: 2021-01-01

## 2021-01-01 RX ORDER — METRONIDAZOLE 500 MG/100ML
500 INJECTION, SOLUTION INTRAVENOUS EVERY 12 HOURS
Status: DISCONTINUED | OUTPATIENT
Start: 2021-01-01 | End: 2021-01-01

## 2021-01-01 RX ORDER — METRONIDAZOLE 500 MG/100ML
500 INJECTION, SOLUTION INTRAVENOUS ONCE
Status: COMPLETED | OUTPATIENT
Start: 2021-01-01 | End: 2021-01-01

## 2021-01-01 RX ORDER — MORPHINE SULFATE 20 MG/ML
5 SOLUTION ORAL EVERY 6 HOURS
Status: DISCONTINUED | OUTPATIENT
Start: 2021-01-01 | End: 2021-01-01

## 2021-01-01 RX ORDER — BACLOFEN 500 UG/ML
6.25 INJECTION, SOLUTION INTRATHECAL CONTINUOUS
Status: DISCONTINUED | OUTPATIENT
Start: 2021-01-01 | End: 2021-07-23 | Stop reason: HOSPADM

## 2021-01-01 RX ORDER — DEXTROSE MONOHYDRATE 50 MG/ML
50 INJECTION, SOLUTION INTRAVENOUS CONTINUOUS
Status: DISCONTINUED | OUTPATIENT
Start: 2021-01-01 | End: 2021-01-01

## 2021-01-01 RX ORDER — FOLIC ACID 1 MG/1
1 TABLET ORAL DAILY
COMMUNITY

## 2021-01-01 RX ORDER — LORAZEPAM 2 MG/ML
1 INJECTION INTRAMUSCULAR
Status: DISCONTINUED | OUTPATIENT
Start: 2021-01-01 | End: 2021-01-01

## 2021-01-01 RX ORDER — ENOXAPARIN SODIUM 100 MG/ML
30 INJECTION SUBCUTANEOUS EVERY 24 HOURS
Status: DISCONTINUED | OUTPATIENT
Start: 2021-01-01 | End: 2021-01-01

## 2021-01-01 RX ORDER — METOPROLOL TARTRATE 5 MG/5ML
1.25 INJECTION INTRAVENOUS ONCE
Status: COMPLETED | OUTPATIENT
Start: 2021-01-01 | End: 2021-01-01

## 2021-01-01 RX ORDER — POTASSIUM CHLORIDE 7.45 MG/ML
10 INJECTION INTRAVENOUS
Status: DISPENSED | OUTPATIENT
Start: 2021-01-01 | End: 2021-01-01

## 2021-01-01 RX ORDER — BACLOFEN 10 MG/1
TABLET ORAL 3 TIMES DAILY
COMMUNITY

## 2021-01-01 RX ORDER — HYDRALAZINE HYDROCHLORIDE 20 MG/ML
10 INJECTION INTRAMUSCULAR; INTRAVENOUS
Status: DISCONTINUED | OUTPATIENT
Start: 2021-01-01 | End: 2021-01-01

## 2021-01-01 RX ORDER — SODIUM CHLORIDE 9 MG/ML
50 INJECTION, SOLUTION INTRAVENOUS CONTINUOUS
Status: DISCONTINUED | OUTPATIENT
Start: 2021-01-01 | End: 2021-01-01

## 2021-01-01 RX ORDER — SODIUM CHLORIDE 0.9 % (FLUSH) 0.9 %
5-40 SYRINGE (ML) INJECTION AS NEEDED
Status: DISCONTINUED | OUTPATIENT
Start: 2021-01-01 | End: 2021-07-23 | Stop reason: HOSPADM

## 2021-01-01 RX ORDER — METOPROLOL SUCCINATE 25 MG/1
25 TABLET, EXTENDED RELEASE ORAL DAILY
COMMUNITY

## 2021-01-01 RX ORDER — POLYETHYLENE GLYCOL 3350 17 G/17G
17 POWDER, FOR SOLUTION ORAL DAILY PRN
Status: DISCONTINUED | OUTPATIENT
Start: 2021-01-01 | End: 2021-01-01

## 2021-01-01 RX ORDER — LORAZEPAM 2 MG/ML
0.5 INJECTION INTRAMUSCULAR
Status: DISCONTINUED | OUTPATIENT
Start: 2021-01-01 | End: 2021-01-01

## 2021-01-01 RX ORDER — ALBUMIN HUMAN 250 G/1000ML
25 SOLUTION INTRAVENOUS ONCE
Status: COMPLETED | OUTPATIENT
Start: 2021-01-01 | End: 2021-01-01

## 2021-01-01 RX ORDER — ENOXAPARIN SODIUM 100 MG/ML
1 INJECTION SUBCUTANEOUS EVERY 12 HOURS
Status: DISCONTINUED | OUTPATIENT
Start: 2021-01-01 | End: 2021-01-01

## 2021-01-01 RX ORDER — PREDNISONE 10 MG/1
5 TABLET ORAL DAILY
COMMUNITY

## 2021-01-01 RX ORDER — MORPHINE SULFATE 2 MG/ML
2 INJECTION, SOLUTION INTRAMUSCULAR; INTRAVENOUS ONCE
Status: COMPLETED | OUTPATIENT
Start: 2021-01-01 | End: 2021-01-01

## 2021-01-01 RX ORDER — CLONIDINE 0.1 MG/24H
1 PATCH, EXTENDED RELEASE TRANSDERMAL
Status: DISCONTINUED | OUTPATIENT
Start: 2021-01-01 | End: 2021-07-23 | Stop reason: HOSPADM

## 2021-01-01 RX ORDER — MORPHINE SULFATE 2 MG/ML
0.5 INJECTION, SOLUTION INTRAMUSCULAR; INTRAVENOUS
Status: DISCONTINUED | OUTPATIENT
Start: 2021-01-01 | End: 2021-01-01

## 2021-01-01 RX ORDER — BACLOFEN 20 MG/1
20 TABLET ORAL 2 TIMES DAILY
COMMUNITY

## 2021-01-01 RX ORDER — DEXTROSE 50 % IN WATER (D50W) INTRAVENOUS SYRINGE
25-50 AS NEEDED
Status: DISCONTINUED | OUTPATIENT
Start: 2021-01-01 | End: 2021-01-01

## 2021-01-01 RX ORDER — METOPROLOL TARTRATE 5 MG/5ML
5 INJECTION INTRAVENOUS EVERY 6 HOURS
Status: DISCONTINUED | OUTPATIENT
Start: 2021-01-01 | End: 2021-01-01

## 2021-01-01 RX ORDER — SODIUM BICARBONATE 84 MG/ML
50 INJECTION, SOLUTION INTRAVENOUS ONCE
Status: COMPLETED | OUTPATIENT
Start: 2021-01-01 | End: 2021-01-01

## 2021-01-01 RX ORDER — ONDANSETRON 4 MG/1
4 TABLET, ORALLY DISINTEGRATING ORAL
Status: DISCONTINUED | OUTPATIENT
Start: 2021-01-01 | End: 2021-07-23 | Stop reason: HOSPADM

## 2021-01-01 RX ORDER — LEVETIRACETAM 500 MG/1
500 TABLET ORAL 2 TIMES DAILY
COMMUNITY

## 2021-01-01 RX ORDER — ACETAMINOPHEN 650 MG/1
650 SUPPOSITORY RECTAL
Status: DISCONTINUED | OUTPATIENT
Start: 2021-01-01 | End: 2021-07-23 | Stop reason: HOSPADM

## 2021-01-01 RX ORDER — HYOSCYAMINE SULFATE 0.12 MG/1
0.12 TABLET SUBLINGUAL
Status: DISCONTINUED | OUTPATIENT
Start: 2021-01-01 | End: 2021-07-23 | Stop reason: HOSPADM

## 2021-01-01 RX ORDER — METOPROLOL TARTRATE 5 MG/5ML
5 INJECTION INTRAVENOUS ONCE
Status: COMPLETED | OUTPATIENT
Start: 2021-01-01 | End: 2021-01-01

## 2021-01-01 RX ORDER — MORPHINE SULFATE 20 MG/ML
5 SOLUTION ORAL EVERY 4 HOURS
Status: DISCONTINUED | OUTPATIENT
Start: 2021-01-01 | End: 2021-07-23 | Stop reason: HOSPADM

## 2021-01-01 RX ORDER — IPRATROPIUM BROMIDE AND ALBUTEROL SULFATE 2.5; .5 MG/3ML; MG/3ML
3 SOLUTION RESPIRATORY (INHALATION)
Status: DISCONTINUED | OUTPATIENT
Start: 2021-01-01 | End: 2021-07-23 | Stop reason: HOSPADM

## 2021-01-01 RX ORDER — SODIUM CHLORIDE 0.9 % (FLUSH) 0.9 %
5-40 SYRINGE (ML) INJECTION EVERY 8 HOURS
Status: DISCONTINUED | OUTPATIENT
Start: 2021-01-01 | End: 2021-07-23 | Stop reason: HOSPADM

## 2021-01-01 RX ORDER — METRONIDAZOLE 500 MG/100ML
500 INJECTION, SOLUTION INTRAVENOUS EVERY 8 HOURS
Status: DISCONTINUED | OUTPATIENT
Start: 2021-01-01 | End: 2021-01-01

## 2021-01-01 RX ORDER — ONDANSETRON 2 MG/ML
4 INJECTION INTRAMUSCULAR; INTRAVENOUS
Status: DISCONTINUED | OUTPATIENT
Start: 2021-01-01 | End: 2021-07-23 | Stop reason: HOSPADM

## 2021-01-01 RX ORDER — DEXTROSE, SODIUM CHLORIDE, AND POTASSIUM CHLORIDE 5; .45; .15 G/100ML; G/100ML; G/100ML
50 INJECTION INTRAVENOUS CONTINUOUS
Status: DISCONTINUED | OUTPATIENT
Start: 2021-01-01 | End: 2021-01-01

## 2021-01-01 RX ORDER — CLONAZEPAM 0.5 MG/1
0.25 TABLET ORAL 2 TIMES DAILY
COMMUNITY

## 2021-01-01 RX ORDER — POTASSIUM CHLORIDE 7.45 MG/ML
10 INJECTION INTRAVENOUS
Status: DISCONTINUED | OUTPATIENT
Start: 2021-01-01 | End: 2021-01-01

## 2021-01-01 RX ORDER — FACIAL-BODY WIPES
10 EACH TOPICAL DAILY PRN
Status: DISCONTINUED | OUTPATIENT
Start: 2021-01-01 | End: 2021-07-23 | Stop reason: HOSPADM

## 2021-01-01 RX ORDER — GLYCOPYRROLATE 0.2 MG/ML
0.2 INJECTION INTRAMUSCULAR; INTRAVENOUS
Status: DISCONTINUED | OUTPATIENT
Start: 2021-01-01 | End: 2021-01-01

## 2021-01-01 RX ORDER — ACETAMINOPHEN 325 MG/1
650 TABLET ORAL
Status: DISCONTINUED | OUTPATIENT
Start: 2021-01-01 | End: 2021-07-23 | Stop reason: HOSPADM

## 2021-01-01 RX ORDER — SCOLOPAMINE TRANSDERMAL SYSTEM 1 MG/1
1 PATCH, EXTENDED RELEASE TRANSDERMAL
Status: DISCONTINUED | OUTPATIENT
Start: 2021-01-01 | End: 2021-07-23 | Stop reason: HOSPADM

## 2021-01-01 RX ADMIN — POTASSIUM CHLORIDE 10 MEQ: 10 INJECTION, SOLUTION INTRAVENOUS at 13:41

## 2021-01-01 RX ADMIN — Medication 10 ML: at 21:25

## 2021-01-01 RX ADMIN — MORPHINE SULFATE 0.5 MG: 2 INJECTION, SOLUTION INTRAMUSCULAR; INTRAVENOUS at 16:10

## 2021-01-01 RX ADMIN — Medication 10 ML: at 21:03

## 2021-01-01 RX ADMIN — METOPROLOL TARTRATE 5 MG: 5 INJECTION INTRAVENOUS at 05:37

## 2021-01-01 RX ADMIN — METOPROLOL TARTRATE 5 MG: 5 INJECTION INTRAVENOUS at 23:28

## 2021-01-01 RX ADMIN — MAGNESIUM SULFATE HEPTAHYDRATE 1 G: 1 INJECTION, SOLUTION INTRAVENOUS at 08:02

## 2021-01-01 RX ADMIN — ENOXAPARIN SODIUM 40 MG: 40 INJECTION SUBCUTANEOUS at 08:12

## 2021-01-01 RX ADMIN — METOPROLOL TARTRATE 5 MG: 5 INJECTION INTRAVENOUS at 23:17

## 2021-01-01 RX ADMIN — METRONIDAZOLE 500 MG: 500 INJECTION, SOLUTION INTRAVENOUS at 12:16

## 2021-01-01 RX ADMIN — LEVETIRACETAM 1000 MG: 100 INJECTION, SOLUTION INTRAVENOUS at 17:11

## 2021-01-01 RX ADMIN — POTASSIUM CHLORIDE 10 MEQ: 10 INJECTION, SOLUTION INTRAVENOUS at 14:44

## 2021-01-01 RX ADMIN — INSULIN LISPRO 2 UNITS: 100 INJECTION, SOLUTION INTRAVENOUS; SUBCUTANEOUS at 12:53

## 2021-01-01 RX ADMIN — MORPHINE SULFATE 0.5 MG: 2 INJECTION, SOLUTION INTRAMUSCULAR; INTRAVENOUS at 18:47

## 2021-01-01 RX ADMIN — METRONIDAZOLE 500 MG: 500 INJECTION, SOLUTION INTRAVENOUS at 11:57

## 2021-01-01 RX ADMIN — VANCOMYCIN HYDROCHLORIDE 750 MG: 750 INJECTION, POWDER, LYOPHILIZED, FOR SOLUTION INTRAVENOUS at 21:27

## 2021-01-01 RX ADMIN — POTASSIUM CHLORIDE 10 MEQ: 10 INJECTION, SOLUTION INTRAVENOUS at 18:00

## 2021-01-01 RX ADMIN — MORPHINE SULFATE 0.5 MG: 2 INJECTION, SOLUTION INTRAMUSCULAR; INTRAVENOUS at 06:31

## 2021-01-01 RX ADMIN — SODIUM PHOSPHATE, MONOBASIC, MONOHYDRATE 15 MMOL: 276; 142 INJECTION, SOLUTION INTRAVENOUS at 21:23

## 2021-01-01 RX ADMIN — Medication 10 ML: at 23:41

## 2021-01-01 RX ADMIN — CEFEPIME HYDROCHLORIDE 2 G: 2 INJECTION, POWDER, FOR SOLUTION INTRAVENOUS at 16:29

## 2021-01-01 RX ADMIN — LEVETIRACETAM 1000 MG: 100 INJECTION, SOLUTION INTRAVENOUS at 20:33

## 2021-01-01 RX ADMIN — DEXTROSE MONOHYDRATE, SODIUM CHLORIDE, AND POTASSIUM CHLORIDE 50 ML/HR: 50; 4.5; 1.49 INJECTION, SOLUTION INTRAVENOUS at 12:54

## 2021-01-01 RX ADMIN — METOPROLOL TARTRATE 5 MG: 5 INJECTION INTRAVENOUS at 05:12

## 2021-01-01 RX ADMIN — POTASSIUM PHOSPHATE, MONOBASIC AND POTASSIUM PHOSPHATE, DIBASIC: 224; 236 INJECTION, SOLUTION, CONCENTRATE INTRAVENOUS at 07:47

## 2021-01-01 RX ADMIN — POTASSIUM CHLORIDE 10 MEQ: 10 INJECTION, SOLUTION INTRAVENOUS at 11:57

## 2021-01-01 RX ADMIN — INSULIN LISPRO 2 UNITS: 100 INJECTION, SOLUTION INTRAVENOUS; SUBCUTANEOUS at 08:00

## 2021-01-01 RX ADMIN — MORPHINE SULFATE 0.5 MG: 2 INJECTION, SOLUTION INTRAMUSCULAR; INTRAVENOUS at 16:25

## 2021-01-01 RX ADMIN — LEVETIRACETAM 1000 MG: 100 INJECTION, SOLUTION INTRAVENOUS at 18:02

## 2021-01-01 RX ADMIN — LORAZEPAM 0.5 MG: 2 INJECTION INTRAMUSCULAR; INTRAVENOUS at 21:18

## 2021-01-01 RX ADMIN — POTASSIUM CHLORIDE 10 MEQ: 10 INJECTION, SOLUTION INTRAVENOUS at 09:27

## 2021-01-01 RX ADMIN — HYOSCYAMINE SULFATE 0.12 MG: 0.12 TABLET ORAL; SUBLINGUAL at 08:40

## 2021-01-01 RX ADMIN — ENOXAPARIN SODIUM 30 MG: 30 INJECTION SUBCUTANEOUS at 08:29

## 2021-01-01 RX ADMIN — POTASSIUM CHLORIDE 10 MEQ: 10 INJECTION, SOLUTION INTRAVENOUS at 06:43

## 2021-01-01 RX ADMIN — LORAZEPAM 1 MG: 2 INJECTION INTRAMUSCULAR; INTRAVENOUS at 14:11

## 2021-01-01 RX ADMIN — LEVETIRACETAM 1000 MG: 100 INJECTION, SOLUTION INTRAVENOUS at 06:39

## 2021-01-01 RX ADMIN — METOPROLOL TARTRATE 5 MG: 5 INJECTION INTRAVENOUS at 17:17

## 2021-01-01 RX ADMIN — SODIUM CHLORIDE 40 MG: 9 INJECTION, SOLUTION INTRAMUSCULAR; INTRAVENOUS; SUBCUTANEOUS at 10:44

## 2021-01-01 RX ADMIN — INSULIN LISPRO 2 UNITS: 100 INJECTION, SOLUTION INTRAVENOUS; SUBCUTANEOUS at 05:50

## 2021-01-01 RX ADMIN — METOPROLOL TARTRATE 2.5 MG: 5 INJECTION INTRAVENOUS at 00:47

## 2021-01-01 RX ADMIN — METRONIDAZOLE 500 MG: 500 INJECTION, SOLUTION INTRAVENOUS at 23:18

## 2021-01-01 RX ADMIN — DILTIAZEM HYDROCHLORIDE 12.5 MG/HR: 5 INJECTION INTRAVENOUS at 17:36

## 2021-01-01 RX ADMIN — CEFEPIME HYDROCHLORIDE 2 G: 2 INJECTION, POWDER, FOR SOLUTION INTRAVENOUS at 04:23

## 2021-01-01 RX ADMIN — METOPROLOL TARTRATE 5 MG: 5 INJECTION INTRAVENOUS at 17:11

## 2021-01-01 RX ADMIN — POTASSIUM CHLORIDE 10 MEQ: 10 INJECTION, SOLUTION INTRAVENOUS at 08:12

## 2021-01-01 RX ADMIN — POTASSIUM CHLORIDE 10 MEQ: 10 INJECTION, SOLUTION INTRAVENOUS at 09:44

## 2021-01-01 RX ADMIN — Medication 10 ML: at 21:17

## 2021-01-01 RX ADMIN — VANCOMYCIN HYDROCHLORIDE 500 MG: 500 INJECTION, POWDER, LYOPHILIZED, FOR SOLUTION INTRAVENOUS at 22:06

## 2021-01-01 RX ADMIN — SODIUM PHOSPHATE, MONOBASIC, MONOHYDRATE 12 MMOL: 276; 142 INJECTION, SOLUTION INTRAVENOUS at 01:46

## 2021-01-01 RX ADMIN — POTASSIUM CHLORIDE 10 MEQ: 10 INJECTION, SOLUTION INTRAVENOUS at 23:51

## 2021-01-01 RX ADMIN — SODIUM CHLORIDE 40 MG: 9 INJECTION, SOLUTION INTRAMUSCULAR; INTRAVENOUS; SUBCUTANEOUS at 08:20

## 2021-01-01 RX ADMIN — LEVETIRACETAM 1000 MG: 100 INJECTION, SOLUTION INTRAVENOUS at 20:12

## 2021-01-01 RX ADMIN — LORAZEPAM 0.5 MG: 2 INJECTION INTRAMUSCULAR; INTRAVENOUS at 01:36

## 2021-01-01 RX ADMIN — POTASSIUM CHLORIDE 10 MEQ: 10 INJECTION, SOLUTION INTRAVENOUS at 08:28

## 2021-01-01 RX ADMIN — Medication 10 ML: at 13:41

## 2021-01-01 RX ADMIN — POTASSIUM CHLORIDE 10 MEQ: 10 INJECTION, SOLUTION INTRAVENOUS at 10:56

## 2021-01-01 RX ADMIN — METRONIDAZOLE 500 MG: 500 INJECTION, SOLUTION INTRAVENOUS at 16:18

## 2021-01-01 RX ADMIN — METOPROLOL TARTRATE 5 MG: 5 INJECTION INTRAVENOUS at 07:59

## 2021-01-01 RX ADMIN — POTASSIUM CHLORIDE 10 MEQ: 10 INJECTION, SOLUTION INTRAVENOUS at 12:11

## 2021-01-01 RX ADMIN — DILTIAZEM HYDROCHLORIDE 10 MG: 5 INJECTION INTRAVENOUS at 14:29

## 2021-01-01 RX ADMIN — MORPHINE SULFATE 1 MG: 2 INJECTION, SOLUTION INTRAMUSCULAR; INTRAVENOUS at 15:53

## 2021-01-01 RX ADMIN — MORPHINE SULFATE 2 MG: 2 INJECTION, SOLUTION INTRAMUSCULAR; INTRAVENOUS at 23:55

## 2021-01-01 RX ADMIN — MORPHINE SULFATE 1 MG: 2 INJECTION, SOLUTION INTRAMUSCULAR; INTRAVENOUS at 10:44

## 2021-01-01 RX ADMIN — VANCOMYCIN HYDROCHLORIDE 1000 MG: 1 INJECTION, POWDER, LYOPHILIZED, FOR SOLUTION INTRAVENOUS at 17:14

## 2021-01-01 RX ADMIN — DILTIAZEM HYDROCHLORIDE 15 MG/HR: 5 INJECTION INTRAVENOUS at 23:37

## 2021-01-01 RX ADMIN — LORAZEPAM 1 MG: 2 INJECTION INTRAMUSCULAR; INTRAVENOUS at 17:11

## 2021-01-01 RX ADMIN — LEVETIRACETAM 1000 MG: 100 INJECTION, SOLUTION INTRAVENOUS at 09:20

## 2021-01-01 RX ADMIN — MORPHINE SULFATE 0.5 MG: 2 INJECTION, SOLUTION INTRAMUSCULAR; INTRAVENOUS at 15:23

## 2021-01-01 RX ADMIN — POTASSIUM CHLORIDE 10 MEQ: 10 INJECTION, SOLUTION INTRAVENOUS at 12:48

## 2021-01-01 RX ADMIN — INSULIN LISPRO 2 UNITS: 100 INJECTION, SOLUTION INTRAVENOUS; SUBCUTANEOUS at 17:26

## 2021-01-01 RX ADMIN — METOPROLOL TARTRATE 1.25 MG: 5 INJECTION INTRAVENOUS at 20:24

## 2021-01-01 RX ADMIN — Medication 10 ML: at 22:12

## 2021-01-01 RX ADMIN — ENOXAPARIN SODIUM 40 MG: 40 INJECTION SUBCUTANEOUS at 07:15

## 2021-01-01 RX ADMIN — POTASSIUM CHLORIDE 10 MEQ: 10 INJECTION, SOLUTION INTRAVENOUS at 20:07

## 2021-01-01 RX ADMIN — DEXTROSE MONOHYDRATE AND SODIUM CHLORIDE 30 ML/HR: 5; .45 INJECTION, SOLUTION INTRAVENOUS at 11:00

## 2021-01-01 RX ADMIN — METOPROLOL TARTRATE 5 MG: 5 INJECTION INTRAVENOUS at 12:16

## 2021-01-01 RX ADMIN — LORAZEPAM 0.5 MG: 2 INJECTION INTRAMUSCULAR; INTRAVENOUS at 06:30

## 2021-01-01 RX ADMIN — INSULIN LISPRO 4 UNITS: 100 INJECTION, SOLUTION INTRAVENOUS; SUBCUTANEOUS at 23:19

## 2021-01-01 RX ADMIN — DILTIAZEM HYDROCHLORIDE 10 MG/HR: 5 INJECTION INTRAVENOUS at 21:26

## 2021-01-01 RX ADMIN — POTASSIUM CHLORIDE 10 MEQ: 10 INJECTION, SOLUTION INTRAVENOUS at 10:35

## 2021-01-01 RX ADMIN — Medication 10 ML: at 15:01

## 2021-01-01 RX ADMIN — METRONIDAZOLE 500 MG: 500 INJECTION, SOLUTION INTRAVENOUS at 10:35

## 2021-01-01 RX ADMIN — HYOSCYAMINE SULFATE 0.12 MG: 0.12 TABLET ORAL; SUBLINGUAL at 08:51

## 2021-01-01 RX ADMIN — DILTIAZEM HYDROCHLORIDE 10 MG/HR: 5 INJECTION INTRAVENOUS at 10:45

## 2021-01-01 RX ADMIN — LEVETIRACETAM 1000 MG: 100 INJECTION, SOLUTION INTRAVENOUS at 20:01

## 2021-01-01 RX ADMIN — DILTIAZEM HYDROCHLORIDE 10 MG/HR: 5 INJECTION INTRAVENOUS at 05:49

## 2021-01-01 RX ADMIN — GLYCOPYRROLATE 0.2 MG: 0.2 INJECTION, SOLUTION INTRAMUSCULAR; INTRAVENOUS at 09:23

## 2021-01-01 RX ADMIN — INSULIN LISPRO 2 UNITS: 100 INJECTION, SOLUTION INTRAVENOUS; SUBCUTANEOUS at 05:21

## 2021-01-01 RX ADMIN — MORPHINE SULFATE 1 MG: 2 INJECTION, SOLUTION INTRAMUSCULAR; INTRAVENOUS at 18:36

## 2021-01-01 RX ADMIN — SODIUM PHOSPHATE, MONOBASIC, MONOHYDRATE 9 MMOL: 276; 142 INJECTION, SOLUTION INTRAVENOUS at 06:43

## 2021-01-01 RX ADMIN — Medication 10 ML: at 14:11

## 2021-01-01 RX ADMIN — HYOSCYAMINE SULFATE 0.12 MG: 0.12 TABLET ORAL; SUBLINGUAL at 14:02

## 2021-01-01 RX ADMIN — SODIUM PHOSPHATE, MONOBASIC, MONOHYDRATE 9 MMOL: 276; 142 INJECTION, SOLUTION INTRAVENOUS at 09:28

## 2021-01-01 RX ADMIN — METOPROLOL TARTRATE 5 MG: 5 INJECTION INTRAVENOUS at 12:10

## 2021-01-01 RX ADMIN — Medication 10 ML: at 07:58

## 2021-01-01 RX ADMIN — POTASSIUM CHLORIDE 10 MEQ: 10 INJECTION, SOLUTION INTRAVENOUS at 23:18

## 2021-01-01 RX ADMIN — POTASSIUM CHLORIDE 10 MEQ: 10 INJECTION, SOLUTION INTRAVENOUS at 10:14

## 2021-01-01 RX ADMIN — LORAZEPAM 0.5 MG: 2 INJECTION INTRAMUSCULAR; INTRAVENOUS at 09:18

## 2021-01-01 RX ADMIN — SODIUM PHOSPHATE, MONOBASIC, MONOHYDRATE 12 MMOL: 276; 142 INJECTION, SOLUTION INTRAVENOUS at 22:10

## 2021-01-01 RX ADMIN — Medication 10 ML: at 12:12

## 2021-01-01 RX ADMIN — METOPROLOL TARTRATE 1.25 MG: 5 INJECTION INTRAVENOUS at 22:11

## 2021-01-01 RX ADMIN — Medication 10 ML: at 21:27

## 2021-01-01 RX ADMIN — POTASSIUM CHLORIDE 10 MEQ: 10 INJECTION, SOLUTION INTRAVENOUS at 13:30

## 2021-01-01 RX ADMIN — CEFEPIME HYDROCHLORIDE 2 G: 2 INJECTION, POWDER, FOR SOLUTION INTRAVENOUS at 16:54

## 2021-01-01 RX ADMIN — CEFEPIME HYDROCHLORIDE 2 G: 2 INJECTION, POWDER, FOR SOLUTION INTRAVENOUS at 03:25

## 2021-01-01 RX ADMIN — SODIUM CHLORIDE 40 MG: 9 INJECTION, SOLUTION INTRAMUSCULAR; INTRAVENOUS; SUBCUTANEOUS at 08:12

## 2021-01-01 RX ADMIN — POTASSIUM CHLORIDE 10 MEQ: 10 INJECTION, SOLUTION INTRAVENOUS at 21:16

## 2021-01-01 RX ADMIN — ENOXAPARIN SODIUM 40 MG: 40 INJECTION SUBCUTANEOUS at 20:01

## 2021-01-01 RX ADMIN — Medication 10 ML: at 05:12

## 2021-01-01 RX ADMIN — Medication 10 ML: at 05:23

## 2021-01-01 RX ADMIN — CEFEPIME HYDROCHLORIDE 2 G: 2 INJECTION, POWDER, FOR SOLUTION INTRAVENOUS at 05:23

## 2021-01-01 RX ADMIN — VANCOMYCIN HYDROCHLORIDE 500 MG: 500 INJECTION, POWDER, LYOPHILIZED, FOR SOLUTION INTRAVENOUS at 16:54

## 2021-01-01 RX ADMIN — METRONIDAZOLE 500 MG: 500 INJECTION, SOLUTION INTRAVENOUS at 12:10

## 2021-01-01 RX ADMIN — VANCOMYCIN HYDROCHLORIDE 750 MG: 750 INJECTION, POWDER, LYOPHILIZED, FOR SOLUTION INTRAVENOUS at 12:16

## 2021-01-01 RX ADMIN — POTASSIUM CHLORIDE 10 MEQ: 10 INJECTION, SOLUTION INTRAVENOUS at 05:37

## 2021-01-01 RX ADMIN — MORPHINE SULFATE 5 MG: 10 SOLUTION ORAL at 14:02

## 2021-01-01 RX ADMIN — HYOSCYAMINE SULFATE 0.12 MG: 0.12 TABLET ORAL; SUBLINGUAL at 14:16

## 2021-01-01 RX ADMIN — CEFEPIME HYDROCHLORIDE 2 G: 2 INJECTION, POWDER, FOR SOLUTION INTRAVENOUS at 15:11

## 2021-01-01 RX ADMIN — METOPROLOL TARTRATE 5 MG: 5 INJECTION INTRAVENOUS at 11:57

## 2021-01-01 RX ADMIN — METRONIDAZOLE 500 MG: 500 INJECTION, SOLUTION INTRAVENOUS at 18:27

## 2021-01-01 RX ADMIN — ENOXAPARIN SODIUM 40 MG: 40 INJECTION SUBCUTANEOUS at 19:10

## 2021-01-01 RX ADMIN — METOPROLOL TARTRATE 5 MG: 5 INJECTION INTRAVENOUS at 06:32

## 2021-01-01 RX ADMIN — MORPHINE SULFATE 5 MG: 10 SOLUTION ORAL at 09:58

## 2021-01-01 RX ADMIN — ENOXAPARIN SODIUM 30 MG: 30 INJECTION SUBCUTANEOUS at 10:44

## 2021-01-01 RX ADMIN — Medication 10 ML: at 13:30

## 2021-01-01 RX ADMIN — INSULIN LISPRO 2 UNITS: 100 INJECTION, SOLUTION INTRAVENOUS; SUBCUTANEOUS at 00:00

## 2021-01-01 RX ADMIN — METRONIDAZOLE 500 MG: 500 INJECTION, SOLUTION INTRAVENOUS at 22:10

## 2021-01-01 RX ADMIN — POTASSIUM CHLORIDE 10 MEQ: 10 INJECTION, SOLUTION INTRAVENOUS at 22:29

## 2021-01-01 RX ADMIN — METOPROLOL TARTRATE 5 MG: 5 INJECTION INTRAVENOUS at 11:50

## 2021-01-01 RX ADMIN — METOPROLOL TARTRATE 5 MG: 5 INJECTION INTRAVENOUS at 00:54

## 2021-01-01 RX ADMIN — DEXTROSE MONOHYDRATE 50 ML/HR: 5 INJECTION, SOLUTION INTRAVENOUS at 08:06

## 2021-01-01 RX ADMIN — ACETAMINOPHEN 650 MG: 650 SUPPOSITORY RECTAL at 15:11

## 2021-01-01 RX ADMIN — POTASSIUM CHLORIDE 10 MEQ: 10 INJECTION, SOLUTION INTRAVENOUS at 09:20

## 2021-01-01 RX ADMIN — MORPHINE SULFATE 0.5 MG: 2 INJECTION, SOLUTION INTRAMUSCULAR; INTRAVENOUS at 08:39

## 2021-01-01 RX ADMIN — SODIUM CHLORIDE 40 MG: 9 INJECTION, SOLUTION INTRAMUSCULAR; INTRAVENOUS; SUBCUTANEOUS at 09:02

## 2021-01-01 RX ADMIN — IOPAMIDOL 100 ML: 612 INJECTION, SOLUTION INTRAVENOUS at 17:40

## 2021-01-01 RX ADMIN — ACETAMINOPHEN 650 MG: 650 SUPPOSITORY RECTAL at 08:21

## 2021-01-01 RX ADMIN — METOPROLOL TARTRATE 5 MG: 5 INJECTION INTRAVENOUS at 23:39

## 2021-01-01 RX ADMIN — POTASSIUM CHLORIDE 10 MEQ: 10 INJECTION, SOLUTION INTRAVENOUS at 09:34

## 2021-01-01 RX ADMIN — LORAZEPAM 0.5 MG: 2 INJECTION INTRAMUSCULAR; INTRAVENOUS at 18:47

## 2021-01-01 RX ADMIN — HYOSCYAMINE SULFATE 0.12 MG: 0.12 TABLET ORAL; SUBLINGUAL at 16:57

## 2021-01-01 RX ADMIN — SODIUM CHLORIDE 100 ML/HR: 900 INJECTION, SOLUTION INTRAVENOUS at 20:01

## 2021-01-01 RX ADMIN — SODIUM PHOSPHATE, MONOBASIC, MONOHYDRATE 15 MMOL: 276; 142 INJECTION, SOLUTION INTRAVENOUS at 08:04

## 2021-01-01 RX ADMIN — POTASSIUM PHOSPHATE, MONOBASIC AND POTASSIUM PHOSPHATE, DIBASIC: 224; 236 INJECTION, SOLUTION, CONCENTRATE INTRAVENOUS at 16:00

## 2021-01-01 RX ADMIN — POTASSIUM CHLORIDE 10 MEQ: 10 INJECTION, SOLUTION INTRAVENOUS at 19:08

## 2021-01-01 RX ADMIN — MORPHINE SULFATE 1 MG: 2 INJECTION, SOLUTION INTRAMUSCULAR; INTRAVENOUS at 21:18

## 2021-01-01 RX ADMIN — METRONIDAZOLE 500 MG: 500 INJECTION, SOLUTION INTRAVENOUS at 03:08

## 2021-01-01 RX ADMIN — MORPHINE SULFATE 5 MG: 10 SOLUTION ORAL at 15:30

## 2021-01-01 RX ADMIN — CEFEPIME HYDROCHLORIDE 2 G: 2 INJECTION, POWDER, FOR SOLUTION INTRAVENOUS at 15:53

## 2021-01-01 RX ADMIN — Medication 10 ML: at 21:24

## 2021-01-01 RX ADMIN — SODIUM BICARBONATE 50 MEQ: 84 INJECTION, SOLUTION INTRAVENOUS at 20:57

## 2021-01-01 RX ADMIN — MORPHINE SULFATE 1 MG: 2 INJECTION, SOLUTION INTRAMUSCULAR; INTRAVENOUS at 21:23

## 2021-01-01 RX ADMIN — METOPROLOL TARTRATE 5 MG: 5 INJECTION INTRAVENOUS at 18:00

## 2021-01-01 RX ADMIN — Medication 10 ML: at 13:01

## 2021-01-01 RX ADMIN — METOPROLOL TARTRATE 2.5 MG: 5 INJECTION INTRAVENOUS at 06:41

## 2021-01-01 RX ADMIN — ENOXAPARIN SODIUM 30 MG: 30 INJECTION SUBCUTANEOUS at 09:02

## 2021-01-01 RX ADMIN — POTASSIUM CHLORIDE 10 MEQ: 10 INJECTION, SOLUTION INTRAVENOUS at 22:10

## 2021-01-01 RX ADMIN — POTASSIUM CHLORIDE 10 MEQ: 10 INJECTION, SOLUTION INTRAVENOUS at 22:39

## 2021-01-01 RX ADMIN — LEVETIRACETAM 1000 MG: 100 INJECTION, SOLUTION INTRAVENOUS at 18:27

## 2021-01-01 RX ADMIN — PERFLUTREN 1 ML: 6.52 INJECTION, SUSPENSION INTRAVENOUS at 08:37

## 2021-01-01 RX ADMIN — MORPHINE SULFATE 1 MG: 2 INJECTION, SOLUTION INTRAMUSCULAR; INTRAVENOUS at 08:46

## 2021-01-01 RX ADMIN — POTASSIUM CHLORIDE 10 MEQ: 10 INJECTION, SOLUTION INTRAVENOUS at 04:13

## 2021-01-01 RX ADMIN — LEVETIRACETAM 1000 MG: 100 INJECTION, SOLUTION INTRAVENOUS at 09:02

## 2021-01-01 RX ADMIN — ACETAMINOPHEN 650 MG: 650 SUPPOSITORY RECTAL at 16:31

## 2021-01-01 RX ADMIN — ENOXAPARIN SODIUM 30 MG: 30 INJECTION SUBCUTANEOUS at 08:20

## 2021-01-01 RX ADMIN — METRONIDAZOLE 500 MG: 500 INJECTION, SOLUTION INTRAVENOUS at 20:12

## 2021-01-01 RX ADMIN — MORPHINE SULFATE 5 MG: 10 SOLUTION ORAL at 11:43

## 2021-01-01 RX ADMIN — CEFEPIME HYDROCHLORIDE 2 G: 2 INJECTION, POWDER, FOR SOLUTION INTRAVENOUS at 17:25

## 2021-01-01 RX ADMIN — MORPHINE SULFATE 0.5 MG: 2 INJECTION, SOLUTION INTRAMUSCULAR; INTRAVENOUS at 16:58

## 2021-01-01 RX ADMIN — METRONIDAZOLE 500 MG: 500 INJECTION, SOLUTION INTRAVENOUS at 03:41

## 2021-01-01 RX ADMIN — MORPHINE SULFATE 1 MG: 2 INJECTION, SOLUTION INTRAMUSCULAR; INTRAVENOUS at 03:29

## 2021-01-01 RX ADMIN — CEFEPIME HYDROCHLORIDE 2 G: 2 INJECTION, POWDER, FOR SOLUTION INTRAVENOUS at 03:41

## 2021-01-01 RX ADMIN — VANCOMYCIN HYDROCHLORIDE 500 MG: 500 INJECTION, POWDER, LYOPHILIZED, FOR SOLUTION INTRAVENOUS at 16:29

## 2021-01-01 RX ADMIN — Medication 10 ML: at 06:44

## 2021-01-01 RX ADMIN — DEXTROSE MONOHYDRATE 50 ML/HR: 5 INJECTION, SOLUTION INTRAVENOUS at 14:00

## 2021-01-01 RX ADMIN — SODIUM CHLORIDE, POTASSIUM CHLORIDE, SODIUM LACTATE AND CALCIUM CHLORIDE 1000 ML: 600; 310; 30; 20 INJECTION, SOLUTION INTRAVENOUS at 15:54

## 2021-01-01 RX ADMIN — VANCOMYCIN HYDROCHLORIDE 1000 MG: 1 INJECTION, POWDER, LYOPHILIZED, FOR SOLUTION INTRAVENOUS at 20:12

## 2021-01-01 RX ADMIN — MORPHINE SULFATE 0.5 MG: 2 INJECTION, SOLUTION INTRAMUSCULAR; INTRAVENOUS at 01:35

## 2021-01-01 RX ADMIN — CEFEPIME HYDROCHLORIDE 2 G: 2 INJECTION, POWDER, FOR SOLUTION INTRAVENOUS at 16:09

## 2021-01-01 RX ADMIN — LEVETIRACETAM 1000 MG: 100 INJECTION, SOLUTION INTRAVENOUS at 08:11

## 2021-01-01 RX ADMIN — Medication 10 ML: at 05:38

## 2021-01-01 RX ADMIN — DILTIAZEM HYDROCHLORIDE 10 MG/HR: 5 INJECTION INTRAVENOUS at 12:53

## 2021-01-01 RX ADMIN — POTASSIUM PHOSPHATE, MONOBASIC AND POTASSIUM PHOSPHATE, DIBASIC: 224; 236 INJECTION, SOLUTION, CONCENTRATE INTRAVENOUS at 15:00

## 2021-01-01 RX ADMIN — LEVETIRACETAM 1000 MG: 100 INJECTION, SOLUTION INTRAVENOUS at 21:13

## 2021-01-01 RX ADMIN — POTASSIUM CHLORIDE 10 MEQ: 10 INJECTION, SOLUTION INTRAVENOUS at 10:46

## 2021-01-01 RX ADMIN — ACETAMINOPHEN 650 MG: 650 SUPPOSITORY RECTAL at 18:06

## 2021-01-01 RX ADMIN — METOPROLOL TARTRATE 2.5 MG: 5 INJECTION INTRAVENOUS at 12:00

## 2021-01-01 RX ADMIN — CEFEPIME HYDROCHLORIDE 2 G: 2 INJECTION, POWDER, FOR SOLUTION INTRAVENOUS at 03:08

## 2021-01-01 RX ADMIN — LEVETIRACETAM 1000 MG: 100 INJECTION, SOLUTION INTRAVENOUS at 19:16

## 2021-01-01 RX ADMIN — METOPROLOL TARTRATE 5 MG: 5 INJECTION INTRAVENOUS at 11:02

## 2021-01-01 RX ADMIN — Medication 10 ML: at 05:04

## 2021-01-01 RX ADMIN — POTASSIUM CHLORIDE 10 MEQ: 10 INJECTION, SOLUTION INTRAVENOUS at 01:47

## 2021-01-01 RX ADMIN — Medication 10 ML: at 05:21

## 2021-01-01 RX ADMIN — LEVETIRACETAM 1000 MG: 100 INJECTION, SOLUTION INTRAVENOUS at 07:15

## 2021-01-01 RX ADMIN — INSULIN LISPRO 2 UNITS: 100 INJECTION, SOLUTION INTRAVENOUS; SUBCUTANEOUS at 19:02

## 2021-01-01 RX ADMIN — POTASSIUM CHLORIDE 10 MEQ: 10 INJECTION, SOLUTION INTRAVENOUS at 03:04

## 2021-01-01 RX ADMIN — LEVETIRACETAM 1000 MG: 100 INJECTION, SOLUTION INTRAVENOUS at 08:51

## 2021-01-01 RX ADMIN — METOPROLOL TARTRATE 5 MG: 5 INJECTION INTRAVENOUS at 05:23

## 2021-01-01 RX ADMIN — MORPHINE SULFATE 0.5 MG: 2 INJECTION, SOLUTION INTRAMUSCULAR; INTRAVENOUS at 13:01

## 2021-01-01 RX ADMIN — DILTIAZEM HYDROCHLORIDE 7.5 MG/HR: 5 INJECTION INTRAVENOUS at 16:00

## 2021-01-01 RX ADMIN — LORAZEPAM 1 MG: 2 INJECTION INTRAMUSCULAR; INTRAVENOUS at 18:11

## 2021-01-01 RX ADMIN — SODIUM CHLORIDE 40 MG: 9 INJECTION, SOLUTION INTRAMUSCULAR; INTRAVENOUS; SUBCUTANEOUS at 09:33

## 2021-01-01 RX ADMIN — POTASSIUM CHLORIDE 10 MEQ: 10 INJECTION, SOLUTION INTRAVENOUS at 01:00

## 2021-01-01 RX ADMIN — MORPHINE SULFATE 1 MG: 2 INJECTION, SOLUTION INTRAMUSCULAR; INTRAVENOUS at 09:18

## 2021-01-01 RX ADMIN — POTASSIUM CHLORIDE 10 MEQ: 10 INJECTION, SOLUTION INTRAVENOUS at 11:30

## 2021-01-01 RX ADMIN — SODIUM CHLORIDE 40 MG: 9 INJECTION, SOLUTION INTRAMUSCULAR; INTRAVENOUS; SUBCUTANEOUS at 08:27

## 2021-01-01 RX ADMIN — LEVETIRACETAM 1000 MG: 100 INJECTION, SOLUTION INTRAVENOUS at 20:00

## 2021-01-01 RX ADMIN — METRONIDAZOLE 500 MG: 500 INJECTION, SOLUTION INTRAVENOUS at 10:45

## 2021-01-01 RX ADMIN — ALBUMIN (HUMAN) 25 G: 0.25 INJECTION, SOLUTION INTRAVENOUS at 20:45

## 2021-01-01 RX ADMIN — METOPROLOL TARTRATE 5 MG: 5 INJECTION INTRAVENOUS at 05:20

## 2021-01-01 RX ADMIN — Medication 10 ML: at 14:00

## 2021-01-01 RX ADMIN — Medication 10 ML: at 20:33

## 2021-01-01 RX ADMIN — VANCOMYCIN HYDROCHLORIDE 500 MG: 500 INJECTION, POWDER, LYOPHILIZED, FOR SOLUTION INTRAVENOUS at 10:45

## 2021-01-01 RX ADMIN — ACETAMINOPHEN 650 MG: 650 SUPPOSITORY RECTAL at 09:41

## 2021-01-01 RX ADMIN — HYDRALAZINE HYDROCHLORIDE 10 MG: 20 INJECTION INTRAMUSCULAR; INTRAVENOUS at 13:38

## 2021-01-01 RX ADMIN — Medication 10 ML: at 06:33

## 2021-01-01 RX ADMIN — LORAZEPAM 0.5 MG: 2 INJECTION INTRAMUSCULAR; INTRAVENOUS at 16:11

## 2021-01-01 RX ADMIN — METOPROLOL TARTRATE 5 MG: 5 INJECTION INTRAVENOUS at 19:00

## 2021-01-01 RX ADMIN — POTASSIUM CHLORIDE 10 MEQ: 10 INJECTION, SOLUTION INTRAVENOUS at 21:24

## 2021-01-01 RX ADMIN — CEFEPIME HYDROCHLORIDE 2 G: 2 INJECTION, POWDER, FOR SOLUTION INTRAVENOUS at 03:54

## 2021-01-01 RX ADMIN — LEVETIRACETAM 1000 MG: 100 INJECTION, SOLUTION INTRAVENOUS at 06:40

## 2021-07-14 PROBLEM — J18.9 PNEUMONIA: Status: ACTIVE | Noted: 2021-01-01

## 2021-07-14 PROBLEM — A41.9 SEPTIC SHOCK (HCC): Status: ACTIVE | Noted: 2021-01-01

## 2021-07-14 PROBLEM — R65.21 SEPTIC SHOCK (HCC): Status: ACTIVE | Noted: 2021-01-01

## 2021-07-14 PROBLEM — R64 CACHEXIA (HCC): Status: ACTIVE | Noted: 2021-01-01

## 2021-07-14 PROBLEM — Z74.01 BEDRIDDEN: Status: ACTIVE | Noted: 2021-01-01

## 2021-07-14 NOTE — ED NOTES
Daughter at bedside. She states patient is a DDNR.  She is aware of low oxygen and high heart rate and Dr. Fauzia Estrella at bedside spoke with daughter she just wants her to have comfort care

## 2021-07-14 NOTE — PROGRESS NOTES
Pharmacy Dosing Services: Cefepime    The following medication: Cefepime 2 gm IV q8h was automatically dose-adjusted to Cefepime 2 gm IV q12h per THE [facility] P&T Committee Protocol, with respect to renal function. Consult provided for this   64 y.o. , female , for the indication of CAP x 7 days . Pt Weight:   Wt Readings from Last 1 Encounters:   07/14/21 36.3 kg (80 lb)     Serum Creatinine Lab Results   Component Value Date/Time    Creatinine 0.68 07/14/2021 03:35 PM    Creatinine (POC) 0.73 07/14/2021 03:36 PM       Creatinine Clearance Estimated Creatinine Clearance: 48.4 mL/min (by C-G formula based on SCr of 0.68 mg/dL). BUN Lab Results   Component Value Date/Time    BUN 18 07/14/2021 03:35 PM         Pharmacy to continue to monitor patient daily. Will make dosage adjustments based upon changing renal function. Signed Shanika Jordan.  Contact information: [phone]

## 2021-07-14 NOTE — PALLIATIVE CARE
Called to ED to see patient and discuss comfort measures/hospice with patient's daughter and spouse. Met with daughter, Deon Vázquez, who shared that her mother has been diagnosed with MS since 2003. Reported her mother vomited at home and she thinks she may have aspirated. Patient is followed by home palliative with [facility]. Daughter states that patient is DNR/DNI. Daughter provided AMD which names patient's spouse, Servando Khan and daughter, Deon Vázquez as MPOAs, either of whom may act. Daughter understands that her mother's time is limited and we discussed option of comfort measures in detail as well as hospice. Daughter shared that they would like patient to continue medical treatment including IV fluids and antibiotics to see how patient responds before deciding on comfort measures. Briefly discussed hospice and daughter was agreeable to having a hospice consult placed. Explained to daughter that if family decided at any point to shift the focus of care to comfort measures they could do that. Palliative medicine will continue to follow. Hospice consult placed. Goals of care are DNR/DNI. Thank you for the opportunity to participate in the care of Ms. Marilou Zhang.     Tauna Babinski, [facility]  Palliative Medicine

## 2021-07-14 NOTE — ROUTINE PROCESS
TRANSFER - OUT REPORT:    Verbal report given to Frank Childs RN(name) on Micaela Juarez  being transferred to ICU(unit) for routine progression of care       Report consisted of patients Situation, Background, Assessment and   Recommendations(SBAR). Information from the following report(s) SBAR, Kardex, ED Summary, [facility] and Cardiac Rhythm sinus tach was reviewed with the receiving nurse. Lines:   Peripheral IV 07/14/21 Right Forearm (Active)   Site Assessment Clean, dry, & intact 07/14/21 1548   Phlebitis Assessment 0 07/14/21 1548   Infiltration Assessment 0 07/14/21 1548   Dressing Status Clean, dry, & intact 07/14/21 1548   Dressing Type Transparent 07/14/21 1548       Peripheral IV 07/14/21 Right Wrist (Active)   Site Assessment Clean, dry, & intact 07/14/21 1549   Phlebitis Assessment 0 07/14/21 1549   Infiltration Assessment 0 07/14/21 1549   Dressing Status Clean, dry, & intact 07/14/21 1549   Dressing Type Transparent 07/14/21 1549        Opportunity for questions and clarification was provided.       Patient transported with:   Monitor  O2 @ 15 liters nrb mask liters  Registered Nurse  [facility]

## 2021-07-14 NOTE — CONSULTS
[facility]  Pulmonary, Critical Care, and Sleep Medicine    Name: Haresh Stein MRN: [id number]   : 1960 Hospital: [facility] [place]   Date: 2021        Pulmonary Critical Care Note    IMPRESSION:   · Acute hypoxic respiratory failure · J96.01 ·   Aspiration pneumonia · J69.0 ·   Sepsis with organ dysfunction · A41.9, R65.20 ·   Leukocytosis · D72.829 ·   Elevated troponin · R77.8 ·   Lactic acidosis · E87.2 ·   Seizure d/o ·  ·   Multiple sclerosis with spastic paralysis ·  ·   There is no problem list on file for this patient. ·    RECOMMENDATIONS:   Respiratory: Supplemental O2 via NRBM, titrate flow, fio2 for goal SPO2> 91%  Await CT chest  Aspiration prevention bundle followed, head of the bed at 30' all times  Bronchodilators as needed, pulmonary hygiene care    ID: Sepsis bundle per hospital protocol  Antibiotic choice: cefepime, Flagyl, Vancomycin; PCN allergy - tolerated cefepime per ER provider  Cultures will be followed. Deescalate antibiotic when appropriate. Lactic acid - initial elevated and repeat till normalized. Await CT chest, abdomen, pelvis    CVS: Monitor Hemodynamics - more on hypertensive side  Check cardiac panel, ECHO  Cardiology consult    Renal: Monitor S. Na+, renal functions, lytes  Replace lytes per unit protocol    Heme: Monitor CBC  No evidence of active bleeding  Endo: Glycemic control as needed  GI: diet - NPO  Neurology: non-verbal, lethargic, does not follow commands  Await CT head  AM labs    Will defer respective systems problem management to primary and other consultant and follow patient in ICU with primary and other medical team  Further recommendations will be based on the patient's response to recommended treatment and results of the investigation ordered. Quality Care: PPI, DVT prophylaxis, HOB elevated, Infection control all reviewed and addressed.   PAIN AND SEDATION: judicious opiate; avoid sedative  Skin/Wound: skin breakdowns LT hip, sacrum  Prophylaxis: DVT and GI Prophylaxis reviewed. Restraints: none   PT/OT eval and treat: as needed when stable   Lines/Tubes: PIV     ADVANCE DIRECTIVE: DNR. Palliative care consulted  DISCUSSION: Events and notes from last 24 hours reviewed. Care plan discussed with nursing, hospitalist, RT  D/w family- (answered all questions to satisfaction). Quality Care: PPI, DVT prophylaxis, HOB elevated, Infection control all reviewed and addressed. High complexity decision making was performed during the evaluation of this patient at high risk for decompensation with multiple organ involvement. Total critical care time spent rendering complex decision making and > 50% time spent in face to face evaluation exclusive of procedures/family discussion/coordination of care: 60 minutes. Subjective/History:   Ms. Marilee Bryson has been seen and evaluated as Dr. Jensen Carrero requested now for assisting in ICU care for Acute hypoxic respiratory failure, aspiration pneumonia with sepsis with organ dysfunction. Patient is a 64 y.o. female with PMHx for MS, seizure d/o, decub ulcers presented from home via EMS for progressive change in mentation and lethargy, poor appetite, increased work of breathing, fever, vomiting, episode of aspiration. The patient is critically ill and can not provide additional history due to unable to comprehend. Information limited and provided by . Pt is bed bound at home from MS; feeds orally, able to smile back and may say \"hi\" back once in while otherwise non-verbal. In Er, pt noted to be hypoxic requiring NRBM for increased work of breathing and hypoxia. XR chest showed bibasilar infiltrates. Labs showed leukocytosis, elevated lactic acid, troponin, pro-BNP. Pt is admitted to ICU for close monitoring. She is DNR/DNI per ER provider and . Other information is limited. Review of Systems:  Review of systems not obtained due to patient factors.       Past Medical History:  Past Medical History:   Diagnosis Date    MS (multiple sclerosis) ([facility] [address]         Past Surgical History:  No past surgical history on file. Medications:  Prior to Admission medications    Not on File       Current Facility-Administered Medications   Medication Dose Route Frequency    vancomycin (VANCOCIN) 1,000 mg in 0.9% sodium chloride 250 mL (VIAL-MATE)  1,000 mg IntraVENous ONCE    acetaminophen (TYLENOL) suppository 650 mg  650 mg Rectal ONCE       Allergy:  Allergies   Allergen Reactions    Pcn [Penicillins] Unknown (comments)        Social History:  Social History     Tobacco Use    Smoking status: Not on file   Substance Use Topics    Alcohol use: Not on file    Drug use: Not on file        Family History:  No family history on file. Objective:   Vital Signs:    Blood pressure (!) 150/95, pulse (!) 160, temperature (!) 104.4 °F (40.2 °C), resp. rate 30, height 5' 3\" (1.6 m), weight 36.3 kg (80 lb), SpO2 100 %. Body mass index is 14.17 kg/m². O2 Device: Non-rebreather mask   O2 Flow Rate (L/min): 15 l/min   Temp (24hrs), Av.4 °F (40.2 °C), Min:104.4 °F (40.2 °C), Max:104.4 °F (40.2 °C)         Intake/Output:   Last shift:      No intake/output data recorded. Last 3 shifts: No intake/output data recorded.   No intake or output data in the 24 hours ending 21 1747      Physical Exam:  General: Lethargic and doesn't follow commands, appears dehydrated, severely ill and cachectic, appears older than stated age, on NRBM  HEENT: PERRL, fundi benign, visible oral mucosa dry  Neck: No abnormally enlarged lymph nodes or thyroid, supple  Chest: normal  Lungs: normal air entry, few rhonchi scattered bilaterally, normal percussion anterior chest wall bilaterally, no tenderness/ rash  Heart: Regular rate and rhythm, S1S2 present or without murmur or extra heart sounds  Abdomen: non distended, bowel sounds normoactive, tympanic, abdomen is soft without significant tenderness, masses, organomegaly or guarding, rigidity, rebound  Extremity: negative for edema, cyanosis, clubbing; LT sacral large decub wound with some necrotic tissue  Capillary refill: normal  Neuro: lethargic, doesn't follow any commands, non-verbal, exam limitations  Skin: Skin color, texture, turgor poor. Skin dry, warm, non-diaphoretic    Data:     Recent Results (from the past 24 hour(s))   EKG, 12 LEAD, INITIAL    Collection Time: 07/14/21  3:29 PM   Result Value Ref Range    Ventricular Rate 162 BPM    Atrial Rate 162 BPM    P-R Interval 96 ms    QRS Duration 72 ms    Q-T Interval 312 ms    QTC Calculation (Bezet) 512 ms    Calculated P Axis 67 degrees    Calculated R Axis -103 degrees    Calculated T Axis 88 degrees    Diagnosis       Sinus tachycardia with short WA  Right superior axis deviation  Pulmonary disease pattern  Right ventricular hypertrophy  Nonspecific ST and T wave abnormality  Abnormal ECG  No previous ECGs available     CBC WITH AUTOMATED DIFF    Collection Time: 07/14/21  3:35 PM   Result Value Ref Range    WBC 17.1 (H) 4.6 - 13.2 K/uL    RBC 4.65 4.20 - 5.30 M/uL    HGB 12.9 12.0 - 16.0 g/dL    HCT 42.0 35.0 - 45.0 %    MCV 90.3 74.0 - 97.0 FL    MCH 27.7 24.0 - 34.0 PG    MCHC 30.7 (L) 31.0 - 37.0 g/dL    RDW 16.1 (H) 11.6 - 14.5 %    PLATELET 046 (H) 487 - 420 K/uL    MPV 9.3 9.2 - 11.8 FL    NEUTROPHILS 74 (H) 40 - 73 %    BAND NEUTROPHILS 14 (H) 0 - 5 %    LYMPHOCYTES 9 (L) 21 - 52 %    MONOCYTES 3 3 - 10 %    EOSINOPHILS 0 0 - 5 %    BASOPHILS 0 0 - 2 %    ABS. NEUTROPHILS 15.1 (H) 1.8 - 8.0 K/UL    ABS. LYMPHOCYTES 1.5 0.9 - 3.6 K/UL    ABS. MONOCYTES 0.5 0.05 - 1.2 K/UL    ABS. EOSINOPHILS 0.0 0.0 - 0.4 K/UL    ABS.  BASOPHILS 0.0 0.0 - 0.1 K/UL    DF MANUAL      PLATELET COMMENTS Increased Platelets      RBC COMMENTS NORMOCYTIC, NORMOCHROMIC      WBC COMMENTS VACUOLATED POLYS     METABOLIC PANEL, COMPREHENSIVE    Collection Time: 07/14/21  3:35 PM   Result Value Ref Range    Sodium 129 (L) 136 - 145 mmol/L    Potassium 4.6 3.5 - 5.5 mmol/L    Chloride 94 (L) 100 - 111 mmol/L    CO2 24 21 - 32 mmol/L    Anion gap 11 3.0 - 18 mmol/L    Glucose 182 (H) 74 - 99 mg/dL    BUN 18 7.0 - 18 MG/DL    Creatinine 0.68 0.6 - 1.3 MG/DL    BUN/Creatinine ratio 26 (H) 12 - 20      GFR est AA >60 >60 ml/min/1.73m2    GFR est non-AA >60 >60 ml/min/1.73m2    Calcium 10.3 (H) 8.5 - 10.1 MG/DL    Bilirubin, total 0.6 0.2 - 1.0 MG/DL    ALT (SGPT) 12 (L) 13 - 56 U/L    AST (SGOT) 26 10 - 38 U/L    Alk.  phosphatase 115 45 - 117 U/L    Protein, total 9.5 (H) 6.4 - 8.2 g/dL    Albumin 2.7 (L) 3.4 - 5.0 g/dL    Globulin 6.8 (H) 2.0 - 4.0 g/dL    A-G Ratio 0.4 (L) 0.8 - 1.7     NT-PRO BNP    Collection Time: 07/14/21  3:35 PM   Result Value Ref Range    NT pro-BNP 6,289 (H) 0 - 900 PG/ML   MAGNESIUM    Collection Time: 07/14/21  3:35 PM   Result Value Ref Range    Magnesium 2.3 1.6 - 2.6 mg/dL   PHOSPHORUS    Collection Time: 07/14/21  3:35 PM   Result Value Ref Range    Phosphorus 3.7 2.5 - 4.9 MG/DL   PROTHROMBIN TIME + INR    Collection Time: 07/14/21  3:35 PM   Result Value Ref Range    Prothrombin time 16.0 (H) 11.5 - 15.2 sec    INR 1.3 (H) 0.8 - 1.2     BLOOD GAS,CHEM8,LACTIC ACID POC    Collection Time: 07/14/21  3:36 PM   Result Value Ref Range    Calcium, ionized (POC) 1.24 1.12 - 1.32 mmol/L    Base deficit (POC) 0.2 mmol/L    HCO3 (POC) 26.1 (H) 22 - 26 MMOL/L    CO2, POC 27 (H) 19 - 24 MMOL/L    O2 SAT 52 %    Sample source VENOUS BLOOD      Performed by Kim Cid     Sodium (POC) 132 (L) 136 - 145 mmol/L    Potassium (POC) 5.6 (H) 3.5 - 5.1 mmol/L    Glucose (POC) 222 (H) 65 - 100 mg/dL    Creatinine (POC) 0.73 0.6 - 1.3 mg/dL    Lactic Acid (POC) 4.89 (HH) 0.40 - 2.00 mmol/L    Chloride (POC) 93 (L) 98 - 107 mmol/L    Anion gap, POC 13 10 - 20      GFRAA, POC >60 >60 ml/min/1.73m2    GFRNA, POC >60 >60 ml/min/1.73m2    pH, venous (POC) 7.35 7.32 - 7.42      pCO2, venous (POC) 46.9 41 - 51 MMHG    pO2, venous (POC) 29 25 - 40 mmHg   TROPONIN I    Collection Time: 07/14/21  3:45 PM   Result Value Ref Range    Troponin-I, QT 1.16 (HH) 0.0 - 0.045 NG/ML   POC LACTIC ACID    Collection Time: 07/14/21  4:13 PM   Result Value Ref Range    Lactic Acid (POC) 11.57 (HH) 0.40 - 2.00 mmol/L   COVID-19 RAPID TEST    Collection Time: 07/14/21  4:50 PM   Result Value Ref Range    Specimen source NASAL      COVID-19 rapid test Not detected NOTD             Recent Labs     07/14/21  1536   HCO3I 26.1*       All Micro Results     Procedure Component Value Units Date/Time    COVID-19 RAPID TEST [[id number]] Collected: 07/14/21 1650    Order Status: Completed Specimen: Nasopharyngeal Updated: 07/14/21 1722     Specimen source NASAL        COVID-19 rapid test Not detected        Comment: Rapid Abbott ID Now       Rapid NAAT:  The specimen is NEGATIVE for SARS-CoV-2, the novel coronavirus associated with COVID-19. Negative results should be treated as presumptive and, if inconsistent with clinical signs and symptoms or necessary for patient management, should be tested with an alternative molecular assay. Negative results do not preclude SARS-CoV-2 infection and should not be used as the sole basis for patient management decisions. This test has been authorized by the FDA under an Emergency Use Authorization (EUA) for use by authorized laboratories.    Fact sheet for Healthcare Providers: ConventionUpdate.co.nz  Fact sheet for Patients: ConventionUpdate.co.nz       Methodology: Isothermal Nucleic Acid Amplification         CULTURE, BLOOD [[id number]] Collected: 07/14/21 1539    Order Status: Completed Specimen: Blood Updated: 07/14/21 1706    CULTURE, BLOOD [[id number]] Collected: 07/14/21 1545    Order Status: Completed Specimen: Blood Updated: 07/14/21 1705          Telemetry: normal sinus rhythm      Imaging:  [x]I have personally reviewed the patients chest radiographs images and report Results from Hospital Encounter encounter on 07/14/21    XR CHEST PORT    Narrative  EXAM: XR CHEST PORT    CLINICAL INDICATION/HISTORY: respiratory failure  -Additional: None    COMPARISON: None    TECHNIQUE: Portable frontal view of the chest    _______________    FINDINGS:    SUPPORT DEVICES: None. HEART AND MEDIASTINUM: Cardiomediastinal silhouette within normal limits. LUNGS AND PLEURAL SPACES: Bilateral patchy parenchymal/interstitial opacities,  right greater left. No large effusion or pneumothorax.    _______________    Impression  Bilateral patchy parenchymal/interstitial opacities, right greater left. May  reflect atypical infection. No results found for this or any previous visit.       [x]See my orders for details          Roberta Carbone MD

## 2021-07-14 NOTE — PROGRESS NOTES
Assist primary RN in patient transfer to the ICU. Paged hospitalist regarding elevated HR. Dual skin check with LEIDA Desai. Noted sacral wound with mepilex pressure injury, left hip wound, bilateral ankles. Wound care consult placed.

## 2021-07-14 NOTE — CONSULTS
[facility] Consult Note      Patient: Eugenia Wise MRN: [id number]  SSN: [id number]    YOB: 1960  Age: 64 y.o. Sex: female        Date of Consultation: 7/14/2021  Referring Physician: Dr. Yasmin Guidry  Reason for Consultation: elevated troponin    HPI:  I was asked by Shashank Alcaraz to see this pleasant patient for elevated troponin. Eugenia Wise is a 71-year-old patient with  history of multiple sclerosis , severe spastic disorder, seizure disorder and bed ridden with sacral decubitus ulcers came to the hospital with altered mental status, respiratory distress, seizure disorder and possible sepsis also. Patient was found to have mild troponin elevation along with tachycardia. Patient is unable to provide significant history. Most of the history is gathered from medical record and some from her . Her daughter who is a nurse is not available at this point. Patient is DNR and DNI. Past Medical History:   Diagnosis Date    MS (multiple sclerosis) ([facility] [address]      No past surgical history on file. Current Facility-Administered Medications   Medication Dose Route Frequency    vancomycin (VANCOCIN) 1,000 mg in 0.9% sodium chloride 250 mL (VIAL-MATE)  1,000 mg IntraVENous ONCE     No current outpatient medications on file. Allergies and Intolerances: Allergies   Allergen Reactions    Pcn [Penicillins] Unknown (comments)       Family History:   No family history on file. Social History:   She  has no history on file for tobacco use. She  has no history on file for alcohol use.           Review of Systems   limited due to altered mental status      Physical:   Patient Vitals for the past 6 hrs:   Temp Pulse Resp BP SpO2   07/14/21 1626     100 %   07/14/21 1615  (!) 160 30 (!) 150/95    07/14/21 1604  (!) 163 30     07/14/21 1603  (!) 164 (!) 31     07/14/21 1602  (!) 161 (!) 31     07/14/21 1601  (!) 161 (!) 32     07/14/21 1600  (!) 162 27 (!) 143/102    07/14/21 1559  (!) 161 (!) 32     07/14/21 1558  (!) 162 27     07/14/21 1557  (!) 162 29     07/14/21 1556  (!) 160 (!) 34     07/14/21 1555  (!) 160 30  91 %   07/14/21 1554  (!) 159 28  (!) 51 %   07/14/21 1547    (!) 146/96    07/14/21 1536 (!) 104.4 °F (40.2 °C)       07/14/21 1530  (!) 158 22           Exam:   General Appearance: On face mask oxygen, debilitated  HEAD: Atraumatic  NECK: No JVD, no thyroidomeglay. LUNGS: Clear bilaterally. HEART: S1+S2  Sinus tachycardia    ABD: Non-tender, BS Audible    EXT: No edema, and no cysnosis. VASCULAR EXAM: Pulses are intact. PSYCHIATRIC EXAM:  Limited due to altered mental status  NEUROLOGICAL:  Limited due to altered mental status    Review of Data:   LABS:   Lab Results   Component Value Date/Time    WBC 17.1 (H) 07/14/2021 03:35 PM    HGB 12.9 07/14/2021 03:35 PM    HCT 42.0 07/14/2021 03:35 PM    PLATELET 530 (H) 18/21/9500 03:35 PM     Lab Results   Component Value Date/Time    Sodium 129 (L) 07/14/2021 03:35 PM    Potassium 4.6 07/14/2021 03:35 PM    Chloride 94 (L) 07/14/2021 03:35 PM    CO2 24 07/14/2021 03:35 PM    Glucose 182 (H) 07/14/2021 03:35 PM    BUN 18 07/14/2021 03:35 PM    Creatinine 0.68 07/14/2021 03:35 PM     No results found for: CHOL, CHOLX, CHLST, CHOLV, HDL, HDLP, LDL, LDLC, DLDLP, TGLX, TRIGL, TRIGP  No components found for: GPT  No results found for: HBA1C, QRH8YODP, FNC9FMOE    RADIOLOGY:  CT Results  (Last 48 hours)               07/14/21 0557  CT CHEST ABD PELV W CONT Final result    Impression:      1. Multilobar pneumonia       2. No significant findings within the abdomen or pelvis       3. Fibroid uterus       Narrative:  EXAM: CT of the chest, abdomen, and pelvis       INDICATION: Sepsis pna, nausea and vomiting (requested by admitting team). COMPARISON: None.        TECHNIQUE: Axial CT imaging of the chest, abdomen, and pelvis was performed with   intravenous contrast. Multiplanar reformats were generated. One or more dose   reduction techniques were used on this CT: automated exposure control,   adjustment of the mAs and/or kVp according to patient size, and iterative   reconstruction techniques. The specific techniques used on this CT exam have   been documented in the patient's electronic medical record. Digital Imaging and Communications in Medicine (DICOM) format image data are   available to nonaffiliated external healthcare facilities or entities on a   secure, media free, reciprocally searchable basis with patient authorization for   at least a 12 month period after this study. _______________       FINDINGS:       CHEST:       LUNGS: No suspicious nodule or mass. There is multifocal bilateral   consolidation. This is more confluent within the right upper and lower lobes. PLEURA: Normal, with no effusion or pneumothorax. AIRWAY: Normal.       MEDIASTINUM: Normal heart size. No pericardial effusion. Given the limitations   of cardiac motion, great vessels are unremarkable. LYMPH NODES: No enlarged lymph nodes.       ===============       ABDOMEN/PELVIS:       LIVER, BILIARY: Liver attenuation is decreased suggestive of steatosis. No   biliary dilation. Gallbladder is unremarkable. PANCREAS: Normal.       SPLEEN: Normal.       ADRENALS: Normal.       KIDNEYS: Normal.       LYMPH NODES: No enlarged lymph nodes. GASTROINTESTINAL TRACT: No bowel dilation or wall thickening. PELVIC ORGANS: Fibroid uterus. VASCULATURE: Unremarkable. BONES: No acute or aggressive osseous abnormalities identified. OTHER: None.       _______________           07/14/21 1757  CT HEAD WO CONT Final result    Impression:      No acute intracranial findings       Narrative:  EXAM: CT head       INDICATION: Altered mental status       COMPARISON: None.        TECHNIQUE: Axial CT imaging of the head was performed without intravenous   contrast.One or more dose reduction techniques were used on this CT: automated   exposure control, adjustment of the mAs and/or kVp according to patient size,   and iterative reconstruction techniques. The specific techniques used on this   CT exam have been documented in the patient's electronic medical record. Digital Imaging and Communications in Medicine (DICOM) format image data are   available to nonaffiliated external healthcare facilities or entities on a   secure, media free, reciprocally searchable basis with patient authorization for   at least a 12 month period after this study. _______________       FINDINGS:       BRAIN AND POSTERIOR FOSSA: The sulci, folia, ventricles and basal cisterns are   within normal limits for the patient's age. There is no intracranial hemorrhage,   mass effect, or midline shift. There are areas of decreased attenuation within   the periventricular and subcortical white matter. EXTRA-AXIAL SPACES AND MENINGES: There are no abnormal extra-axial fluid   collections. CALVARIUM: Intact. SINUSES: Clear. OTHER: None.       _______________               CXR Results  (Last 48 hours)               07/14/21 1629  XR CHEST PORT Final result    Impression:      Bilateral patchy parenchymal/interstitial opacities, right greater left. May   reflect atypical infection. Narrative:  EXAM: XR CHEST PORT       CLINICAL INDICATION/HISTORY: respiratory failure   -Additional: None       COMPARISON: None       TECHNIQUE: Portable frontal view of the chest       _______________       FINDINGS:       SUPPORT DEVICES: None. HEART AND MEDIASTINUM: Cardiomediastinal silhouette within normal limits. LUNGS AND PLEURAL SPACES: Bilateral patchy parenchymal/interstitial opacities,   right greater left.  No large effusion or pneumothorax.       _______________                   Cardiology Procedures:   Results for orders placed or performed during the hospital encounter of 07/14/21 EKG, 12 LEAD, INITIAL   Result Value Ref Range    Ventricular Rate 162 BPM    Atrial Rate 162 BPM    P-R Interval 96 ms    QRS Duration 72 ms    Q-T Interval 312 ms    QTC Calculation (Bezet) 512 ms    Calculated P Axis 67 degrees    Calculated R Axis -103 degrees    Calculated T Axis 88 degrees    Diagnosis       Sinus tachycardia with short OH  Right superior axis deviation  Pulmonary disease pattern  Right ventricular hypertrophy  Nonspecific ST and T wave abnormality  Abnormal ECG  No previous ECGs available        Echo Results  (Last 48 hours)    None       Cardiolite (Tc-99m Sestamibi) stress test        Impression / Plan:    Patient Active Problem List   Diagnosis Code    Septic shock ([facility]) A41.9, R65.21      assessment and plan    Elevated troponin most likely demand due to tachycardia and sepsis  Septic shock  Multiple sclerosis  Seizure disorder  Severe debilitated state and malnutrition  Sacral decubitus ulcers      Plan. Continue supportive treatment. No need for full dose therapeutic anticoagulation if pulmonary embolism is ruled out for mild troponin elevation. I will continue with gentle hydration  Will get echocardiogram in the morning. Patient heart rate is in the 150-160 and blood pressure is stable at this point may need low-dose metoprolol 1.25 mg q.6 hours with holding parameters. overall prognosis seems to be poor  Discussed with patient's   Discussed with Lobo Marin  Thank you for allowing me to participate in management of this pleasant patient.           Signed By: Reina Sidhu MD     July 14, 2021

## 2021-07-14 NOTE — H&P
History & Physical    Patient: Jayme Honeycutt MRN: [id number]  CSN: [id number]    YOB: 1960  Age: 64 y.o. Sex: female      DOA: 7/14/2021  Primary Care Provider:  Elizabeth Schwab MD      Assessment/Plan     Hospital Problems  Never Reviewed        Codes Class Noted POA    Sepsis [facility]) ICD-10-CM: A41.9  ICD-9-CM: 038.9, 995.91  7/14/2021         MS (multiple sclerosis) ([facility] ICD-10-CM: G35  ICD-9-CM: 340  Unknown Unknown        Seizure ([facility] ICD-10-CM: R56.9  ICD-9-CM: 780.39  Unknown Unknown        Pressure injury of sacral region, stage 4 (HCC) ICD-10-CM: L89.154  ICD-9-CM: 707.03, 707.24  Unknown Unknown        * (Principal) Acute respiratory failure with hypoxia ([facility] ICD-10-CM: J96.01  ICD-9-CM: 518.81  Unknown Unknown        Elevated troponin ICD-10-CM: R77.8  ICD-9-CM: 790.6  Unknown Unknown        Lactic acidosis ICD-10-CM: E87.2  ICD-9-CM: 276.2  Unknown Unknown        Bedridden ICD-10-CM: Z74.01  ICD-9-CM: V49.84  7/14/2021 Unknown        Pneumonia ICD-10-CM: J18.9  ICD-9-CM: 523  7/14/2021 Unknown        Cachexia ([facility] ICD-10-CM: R64  ICD-9-CM: 799.4  7/14/2021 Unknown              Admit to icu     CRITICAL CARE PLAN    Resp -acute respiratory failure with hypoxia  Due to aspiration pna   Continue nonrebreathing mask for now patient is DNR/DNI,  pulm was consulted     Pna : due to aspiration   Continue cefepime and vanc   Breathing tx as needed aspiration precaution        ID - Follow up blood and urine cx. ANTIBIOTICS . Vanco and cefepime, follow-up culture  Trend temps, wbc, LA improving. Aspiration pneumonia, decubitus ulcer  Continue IV antibiotics, follow-up with culture results    CVS -   Elevated troponin: Lovenox for possible ACS. Continue seeing the trend of cardiac enzyme, cardiologist on board  Echo     Tachycardia: Received bolus, likely due to dehydration and sepsis. Continue normal saline infusion    Low dose metoprolol     Heme/onc - Follow H&H, plts. INR.  Leukocytosis: Due to sepsis, will see the trend    Renal -   Lactic acidosis: Due to infection, hypoxia, and  seizure , will give bicarb   Hyponatremia : NS infusion , continue monitoring sodium   Hypercalcemia : Due to dehydration , normal saline infusion   trend BUN, Cr, follow I/O, soto in place. Check and replace Mg, K, phos. Endocrine -  Follow FSG  Check tsh     Neuro   seizures: Received Ativan, Keppra  Case discussed with Dr. Martine Xavier, continue Keppra IV  EEG defer to neurologist  CT head no acute process    MS: Bedridden almost 1 year. Baseline: Sometimes can recognize family member    GI - NPO for now. ppi     Derm: Decubitus ulcer, lesion on ankle  Wound care. Cachexia :            AC: I have had a discussion with   regarding code status. Particularly, described potential options in event of cardiac or respiratory arrest. I have explained what being full code entails, including cardiorespiratory resuscitation attempts with chest compressions, potential cariodioversion/ \" shocks\" as well as intubation. I have also explained Do not resuscitate which would mean we allow a natural death with out aggressive interventions. DNR/DNI    Please note that this dictation was completed with [employer], the computer voice recognition software. Quite often unanticipated grammatical, syntax, homophones, and other interpretive errors are inadvertently transcribed by the computer software. Please disregard these errors. Please excuse any errors that have escaped final proofreading    70 minutes of critical care time spent in the direct evaluation and treatment of this high risk patient. The reason for providing this level of medical care for this critically ill patient was due a critical illness that impaired one or more vital organ systems such that there was a high probability of imminent or life threatening deterioration in the patients condition.  This care involved high complexity decision making to assess, manipulate, and support vital system functions, to treat this degreee vital organ system failure and to prevent further life threatening deterioration of the patients condition. Estimate  length of stay : TBD     Palliative consult   Poor prognosis     CC: Mental status changes       HPI:     Yefri Matos is a 64 y.o. female with MS, bedridden, dementia,afib, decubitus ulcer presented to ER due to mental status changes and respiratory distress. Per  reported: Caregiver has been working for constipation, patient has been vomited since last Monday. . She become less responsiveness after vomiting. In ER she was found hypoxia, her T was 104  in ER, she had seizure in ER. Keppra and Ativan were administrated. CT head/abdomen/pelvic: no acute process except multiple pna. Sepsis protocol was started in ER.abg done with pH 7.35    Visit Vitals  /86   Pulse (!) 145   Temp 98.2 °F (36.8 °C)   Resp 23   Ht 5' 3\" (1.6 m)   Wt 33.7 kg (74 lb 4.7 oz)   LMP  (LMP Unknown)   SpO2 99%   BMI 13.16 kg/m²    O2 Flow Rate (L/min): 15 l/min O2 Device: Non-rebreather mask      Past Medical History:   Diagnosis Date    MS (multiple sclerosis) (HCC)      Hx of surgery :   Unknown   fhx : htn     Social History     Socioeconomic History    Marital status:      Spouse name: Not on file    Number of children: Not on file    Years of education: Not on file    Highest education level: Not on file     Social Determinants of Health     Financial Resource Strain:     Difficulty of Paying Living Expenses:    Food Insecurity:     Worried About Running Out of Food in the Last Year:     Ran Out of Food in the Last Year:    Transportation Needs:     Lack of Transportation (Medical):      Lack of Transportation (Non-Medical):    Physical Activity:     Days of Exercise per Week:     Minutes of Exercise per Session:    Stress:     Feeling of Stress :    Social Connections:     Frequency of Communication with Friends and Family:     Frequency of Social Gatherings with Friends and Family:     Attends Alevism Services:     Active Member of Clubs or Organizations:     Attends Club or Organization Meetings:     Marital Status:        Prior to Admission medications    Not on File       Allergies   Allergen Reactions    Pcn [Penicillins] Unknown (comments)       Review of Systems  Unable to obtain due to pt 's condition         Physical Exam:     Physical Exam:  Visit Vitals  /86   Pulse (!) 145   Temp 98.2 °F (36.8 °C)   Resp 23   Ht 5' 3\" (1.6 m)   Wt 33.7 kg (74 lb 4.7 oz)   LMP  (LMP Unknown)   SpO2 99%   BMI 13.16 kg/m²    O2 Flow Rate (L/min): 15 l/min O2 Device: Non-rebreather mask    Temp (24hrs), Av.2 °F (38.4 °C), Min:98.2 °F (36.8 °C), Max:104.4 °F (40.2 °C)    No intake/output data recorded. No intake/output data recorded. General:  Awake, not  cooperative, no distress. Cachexia    Head:  Normocephalic, without obvious abnormality, atraumatic. Eyes:  Conjunctivae/corneas clear, sclera anicteric, PERRL, EOMs intact. Nose: Nares normal. No drainage or sinus tenderness. Nonrebreathing mask noted   Throat: Lips, mucosa, and tongue normal. .   Neck: Supple, symmetrical, trachea midline, no adenopathy. Lungs:   Coarse bilateral        Heart:  Tachy,  S1, S2 normal, no murmur, click, rub or gallop. Abdomen: Soft, non-tender. Bowel sounds normal. No masses,  No organomegaly. Extremities: Extremities normal, atraumatic, no cyanosis or edema. Pulses: 2+ and symmetric all extremities. Skin: Skin color-pink, texture, turgor normal. Decubitus ulcer in bottom, skin lesion around ankle and hip.   Capillary refill normal    Neurologic: Non verbal, not follow command, eyes open, move her legs and arms, not follow the command,        Labs Reviewed:    BMP:   Lab Results   Component Value Date/Time     (L) 2021 03:35 PM    K 4.6 2021 03:35 PM    CL 94 (L) 2021 03:35 PM    CO2 24 07/14/2021 03:35 PM    AGAP 11 07/14/2021 03:35 PM     (H) 07/14/2021 03:35 PM    BUN 18 07/14/2021 03:35 PM    CREA 0.68 07/14/2021 03:35 PM    GFRAA >60 07/14/2021 03:35 PM    GFRNA >60 07/14/2021 03:35 PM     CMP:   Lab Results   Component Value Date/Time     (L) 07/14/2021 03:35 PM    K 4.6 07/14/2021 03:35 PM    CL 94 (L) 07/14/2021 03:35 PM    CO2 24 07/14/2021 03:35 PM    AGAP 11 07/14/2021 03:35 PM     (H) 07/14/2021 03:35 PM    BUN 18 07/14/2021 03:35 PM    CREA 0.68 07/14/2021 03:35 PM    GFRAA >60 07/14/2021 03:35 PM    GFRNA >60 07/14/2021 03:35 PM    CA 10.3 (H) 07/14/2021 03:35 PM    MG 2.3 07/14/2021 03:35 PM    PHOS 3.7 07/14/2021 03:35 PM    ALB 2.7 (L) 07/14/2021 03:35 PM    TP 9.5 (H) 07/14/2021 03:35 PM    GLOB 6.8 (H) 07/14/2021 03:35 PM    AGRAT 0.4 (L) 07/14/2021 03:35 PM    ALT 12 (L) 07/14/2021 03:35 PM     CBC:   Lab Results   Component Value Date/Time    WBC 17.1 (H) 07/14/2021 03:35 PM    HGB 12.9 07/14/2021 03:35 PM    HCT 42.0 07/14/2021 03:35 PM     (H) 07/14/2021 03:35 PM     All Cardiac Markers in the last 24 hours:   Lab Results   Component Value Date/Time    TROIQ 1.16 (HH) 07/14/2021 03:45 PM     Recent Glucose Results:   Lab Results   Component Value Date/Time     (H) 07/14/2021 03:35 PM     ABG:   Lab Results   Component Value Date/Time    HCO3I 26.1 (H) 07/14/2021 03:36 PM     COAGS:   Lab Results   Component Value Date/Time    PTP 16.0 (H) 07/14/2021 03:35 PM    INR 1.3 (H) 07/14/2021 03:35 PM     Liver Panel:   Lab Results   Component Value Date/Time    ALB 2.7 (L) 07/14/2021 03:35 PM    TP 9.5 (H) 07/14/2021 03:35 PM    GLOB 6.8 (H) 07/14/2021 03:35 PM    AGRAT 0.4 (L) 07/14/2021 03:35 PM    ALT 12 (L) 07/14/2021 03:35 PM     07/14/2021 03:35 PM     Pancreatic Markers: No results found for: AMYLPOCT, AML, LIPPOCT, LPSE    CT HEAD WO CONT    Result Date: 7/14/2021  EXAM: CT head INDICATION: Altered mental status COMPARISON: None. TECHNIQUE: Axial CT imaging of the head was performed without intravenous contrast.One or more dose reduction techniques were used on this CT: automated exposure control, adjustment of the mAs and/or kVp according to patient size, and iterative reconstruction techniques. The specific techniques used on this CT exam have been documented in the patient's electronic medical record. Digital Imaging and Communications in Medicine (DICOM) format image data are available to nonaffiliated external healthcare facilities or entities on a secure, media free, reciprocally searchable basis with patient authorization for at least a 12 month period after this study. _______________ FINDINGS: BRAIN AND POSTERIOR FOSSA: The sulci, folia, ventricles and basal cisterns are within normal limits for the patient's age. There is no intracranial hemorrhage, mass effect, or midline shift. There are areas of decreased attenuation within the periventricular and subcortical white matter. EXTRA-AXIAL SPACES AND MENINGES: There are no abnormal extra-axial fluid collections. CALVARIUM: Intact. SINUSES: Clear. OTHER: None. _______________     No acute intracranial findings    CT CHEST ABD PELV W CONT    Result Date: 7/14/2021  EXAM: CT of the chest, abdomen, and pelvis INDICATION: Sepsis pna, nausea and vomiting (requested by admitting team). COMPARISON: None. TECHNIQUE: Axial CT imaging of the chest, abdomen, and pelvis was performed with intravenous contrast. Multiplanar reformats were generated. One or more dose reduction techniques were used on this CT: automated exposure control, adjustment of the mAs and/or kVp according to patient size, and iterative reconstruction techniques. The specific techniques used on this CT exam have been documented in the patient's electronic medical record.  Digital Imaging and Communications in Medicine (DICOM) format image data are available to nonaffiliated external healthcare facilities or entities on a secure, media free, reciprocally searchable basis with patient authorization for at least a 12 month period after this study. _______________ FINDINGS: CHEST: LUNGS: No suspicious nodule or mass. There is multifocal bilateral consolidation. This is more confluent within the right upper and lower lobes. PLEURA: Normal, with no effusion or pneumothorax. AIRWAY: Normal. MEDIASTINUM: Normal heart size. No pericardial effusion. Given the limitations of cardiac motion, great vessels are unremarkable. LYMPH NODES: No enlarged lymph nodes. =============== ABDOMEN/PELVIS: LIVER, BILIARY: Liver attenuation is decreased suggestive of steatosis. No biliary dilation. Gallbladder is unremarkable. PANCREAS: Normal. SPLEEN: Normal. ADRENALS: Normal. KIDNEYS: Normal. LYMPH NODES: No enlarged lymph nodes. GASTROINTESTINAL TRACT: No bowel dilation or wall thickening. PELVIC ORGANS: Fibroid uterus. VASCULATURE: Unremarkable. BONES: No acute or aggressive osseous abnormalities identified. OTHER: None. _______________     1. Multilobar pneumonia 2. No significant findings within the abdomen or pelvis 3. Fibroid uterus    XR CHEST PORT    Result Date: 7/14/2021  EXAM: XR CHEST PORT CLINICAL INDICATION/HISTORY: respiratory failure -Additional: None COMPARISON: None TECHNIQUE: Portable frontal view of the chest _______________ FINDINGS: SUPPORT DEVICES: None. HEART AND MEDIASTINUM: Cardiomediastinal silhouette within normal limits. LUNGS AND PLEURAL SPACES: Bilateral patchy parenchymal/interstitial opacities, right greater left. No large effusion or pneumothorax. _______________     Bilateral patchy parenchymal/interstitial opacities, right greater left. May reflect atypical infection.     Procedures/imaging: see electronic medical records for all procedures/Xrays and details which were not copied into this note but were reviewed prior to creation of Severiano Scrape, MD, Internal Medicine     CC: Caro Tracey., MD

## 2021-07-14 NOTE — ED TRIAGE NOTES
Patient arrived via ems with BVM in progress ems reports that patient is a DDNR.  Patient was having ams and shortness of breath and found to be in afib with RVR per ems

## 2021-07-14 NOTE — ACP (ADVANCE CARE PLANNING)
Palliative Medicine    Pt does not have an Advance Directive on file in EMR. Primary Decision Maker (Health Care Agent): Joshua Barrett  Relationship to patient: Daughter  Phone number: [phone]  [x] Named in a newly scanned document   [] Legal Next of Kin  [] Guardian    Secondary Decision Maker ([address]):  Papo Todd  Relationship to patient:   Phone number: [phone]  [x] Named in a newly scanned document   [] Legal Next of Kin  [] Guardian    ACP documents you currently have include:  [x] Newly scanned Advance Directive or Living Will  [] Durable Do Not Resuscitate  [] Physician Orders for Scope of Treatment (POST)  [x] Newly scanned [address]  [] Other     This writer, along with Yari Justin NP, with the Palliative Care team; met with patient's daughter Barbara Valencia) and patient's  Snehal Blackman); to offer support and discuss advance medical directive (AMD) and goals of care. Daughter is a nurse and has complete and full understanding of patient's medical condition and potential complications. Daughter stated that patient has been declining more recently. Daughter had a copy of the AMD/POA that named her and her father the POA/decision makers, for patient. Copies were made to be scanned in patient's EMR and for the Palliative Care team. When the topic of goals of care were brought up; daughter stated that the family would like to make patient a DNR/DNI; however, they are not quite ready to move patient to comfort care measures. She was okay with the Palliative NP placing a hospice consult. Hospice will follow up with family. At this time, patient is a DNR/DNI. Recommendations: The Palliative Care team will continue to offer support to patient and her family; while she remains hospitalized. The Palliative Care team will also re-visit goals of care and potentially moving patient to comfort measures.     Thank you for the Palliative Medicine consult and allowing us to participate in the care of [facility]. Will continue to monitor and provide support. Cholo Garay MSW  Palliative Medicine Inpatient   [address] [facility]: [phone])

## 2021-07-15 PROBLEM — E87.6 HYPOKALEMIA: Status: ACTIVE | Noted: 2021-01-01

## 2021-07-15 PROBLEM — E43 SEVERE PROTEIN-CALORIE MALNUTRITION (HCC): Status: ACTIVE | Noted: 2021-01-01

## 2021-07-15 NOTE — PROGRESS NOTES
Both IVs to right arm infiltrated - a midline was inserted. The edema/swelling to the right arm is starting to diminish. Potassium recheck is now 3.3 - replacement started per protocol. X 5 bags.      Her NRB was switched to [facility] and her 02 Sats are 95%

## 2021-07-15 NOTE — PROGRESS NOTES
1910: TRANSFER - IN REPORT:    Verbal report received from MIRA Foley via MIRA Light on Jess Keane  being received from ED(unit) for routine progression of care      Report consisted of patients Situation, Background, Assessment and   Recommendations(SBAR). Information from the following report(s) SBAR, Kardex, ED Summary, Intake/Output, MAR, Recent Results, Med Rec Status, Cardiac Rhythm Sinus Tach and Alarm Parameters  was reviewed with the receiving nurse. Opportunity for questions and clarification was provided. Paged Dr. Tonie Matthews concerning current status per report. Physician currently receiving report from Dr. Anu Chiu (Day Hospitalist). Per Dr. Anu Chiu continue current treatment plan. 1950: Assessment completed upon patients arrival to unit and care assumed. Dual skin assessment with Dominik Garner RN. Golf-ball sized sacral wound, left hip pressure dressing, bilateral dressing on ankles and baclofen implanted pump in RLQ (per physician and care everywhere) noted. 2000: Paged Dr. Tonie Matthews concerning elevated HR. Dr. Krishna Hill advisement via recent consult note considered. Orders received for low-dose Metoprolol 1.25mg q6h at this time. 2116: Paged Dr. Tonie Matthews concerning persistently elevated HR - 120s-130s despite recent metoprolol dose 1.25mg. Orders for metoprolol will be increased from 1.25 mg. Awaiting modified order. 2126: Paged Dr. Tonie Matthews concerning elevated trop - 1.64 from 1.16. Continue current treatment, Metoprolol increased to 2.5mg q6h. Pt asymptomatic at this time. 2330: Paged Dr. Tonie Matthews concerning elevated BG - 187. Orders for Humalog sliding scale will be placed per physician. Telephone order for one-time dose of Morphine 2mg IVP placed at this time. 0000: Reassessment completed. 0145: Phlebotomist at bedside collecting blood specimen. Received call from lab with critical result. Discussed with Dr. Tonie Matthews and phlebotomist at bedside. Pt being redrawn at this time. Awaiting new results. 0400: Reassessment completed. 0700: Bedside and Verbal shift change report given to Hunter Adams RN (oncoming nurse) by Andrzej Monson RN (offgoing nurse). Report included the following information SBAR, Kardex, ED Summary, Intake/Output, MAR, Recent Results, Med Rec Status, Cardiac Rhythm Sinus Tach and Alarm Parameters .

## 2021-07-15 NOTE — WOUND CARE
07/15/21 1535   Wound Hip left hip wound is open wound vac removed to assess wound area is red and open with some yellow necrotic tissue size of 6cm x6mc about the size of a base ball it is round  07/14/21   Date First Assessed/Time First Assessed: 07/14/21 1633   Present on Hospital Admission: Yes  Primary Wound Type: Pressure Injury  Location: Hip  Wound Description: left hip wound is open wound vac removed to assess wound area is red and open with some. .. Wound Image    Wound Etiology Pressure Stage 4   Dressing Status Clean;Dry; Intact   Cleansed Wound cleanser;Irrigated with saline   Dressing/Treatment Silicone border   Dressing Change Due   (PRN to keep this area clean and dry)   Wound Length (cm) 8.5 cm   Wound Width (cm) 7 cm   Wound Depth (cm) 0.3 cm   Wound Surface Area (cm^2) 59.5 cm^2   Wound Volume (cm^3) 17.85 cm^3   Undermining Starts ___ O'Clock 6 o'clock   Undermining Ends ___ O'Clock 11 o'clock   Undermining Maximum Distance (cm) 5.5 cm  (@ 7o'clock spot)   Wound Assessment Exposed Structure Bone;Pink/red   Drainage Amount Moderate   Drainage Description Serous   Wound Odor None   Kathrine-Wound/Incision Assessment Intact;Fragile   Edges Attached edges; Defined edges   Wound Thickness Description Full thickness   Wound Sacrum open wound 5cm x 5cm to wound vac that was removed 07/14/21   Date First Assessed/Time First Assessed: 07/14/21 1636   Present on Hospital Admission: Yes  Primary Wound Type: Pressure Injury  Location: Sacrum  Wound Description: open wound 5cm x 5cm to wound vac that was removed  Date of First Observation: 07/14/21   Wound Image    Wound Etiology Pressure Stage 4   Dressing Status Clean;Dry; Intact;Breakthrough drainage noted   Cleansed Wound cleanser;Irrigated with saline   Dressing/Treatment Silicone border   Dressing Change Due   (PRN to keep this area clean and dry)   Wound Length (cm) 6 cm   Wound Width (cm) 6 cm   Wound Depth (cm) 0.3 cm   Wound Surface Area (cm^2) 36 cm^2 Wound Volume (cm^3) 10.8 cm^3   Wound Assessment Exposed Structure Bone;Devitalized tissue   Drainage Amount Moderate   Drainage Description Serous   Wound Odor None   Kathrine-Wound/Incision Assessment Intact   Edges Attached edges; Defined edges   Wound Thickness Description Full thickness   Wound Leg Right; Anterior right side of leg has wounds x2 varying degrees unalbe to stage yellow and white and black tissue  07/14/21   Date First Assessed: 07/14/21   Present on Hospital Admission: Yes  Primary Wound Type: Pressure Injury  Location: Leg  Wound Location Orientation: Right; Anterior  Wound Description: right side of leg has wounds x2 varying degrees unalbe to stage yell. .. Wound Image     Wound Etiology Pressure Stage 2   Dressing Status Clean;Dry; Intact   Cleansed Wound cleanser;Irrigated with saline   Dressing/Treatment Silicone border   Dressing Change Due   (PRN to keep this area clean and dry)   Wound Length (cm) 2.5 cm   Wound Width (cm) 1.4 cm   Wound Depth (cm) 0.1 cm   Wound Surface Area (cm^2) 3.5 cm^2   Wound Volume (cm^3) 0.35 cm^3   Undermining Starts ___ O'Clock 12 o'clock   Undermining Ends ___ O'Clock 12 o'clock   Undermining Maximum Distance (cm) 0.5 cm   Wound Assessment Epithelialization;Pink/red   Drainage Amount Small   Drainage Description Serous   Wound Odor None   Kathrine-Wound/Incision Assessment Intact   Edges Attached edges; Defined edges   Wound Thickness Description Full thickness   Wound Ankle Left irregular shaped wound to left ankle tissue is white 07/14/21   Date First Assessed/Time First Assessed: 07/14/21 1853   Present on Hospital Admission: Yes  Primary Wound Type: Pressure Injury  Location: Ankle  Wound Location Orientation: Left  Wound Description: irregular shaped wound to left ankle tissue is whit. .. Wound Image    Wound Etiology Pressure Stage 3   Dressing Status Clean;Dry; Intact   Cleansed Wound cleanser;Irrigated with saline   Dressing/Treatment Silicone border   Dressing Change Due   (PRN to keep area clean and dry)   Wound Length (cm) 1.3 cm   Wound Width (cm) 1 cm   Wound Depth (cm) 0.2 cm   Wound Surface Area (cm^2) 1.3 cm^2   Wound Volume (cm^3) 0.26 cm^3   Wound Assessment Pink/red   Drainage Amount Small   Drainage Description Serous   Wound Odor None   Kathrine-Wound/Incision Assessment Intact   Edges Defined edges   Wound Thickness Description Full thickness

## 2021-07-15 NOTE — PROGRESS NOTES
Palliative Medicine    CODE STATUS: DNR/DNI    AMD Status: AMD on file naming her  and her daughter, Fan Hoyos, as her surrogate decision makers     7/15/2021 0930 Seen today in room ICU 5 along with Shahnaz Medrano NP. Lying in bed. Eyes occasionally open to verbal stimuli. Does seem to blink in response to questions. No grimacing or brow furrowing. Appears comfortable. Respirations unlabored. Non-rebreather mask on.    ~1330. Met with Yelena, daughter, in ICU waiting room. She said that she and her family are seriously considering taking her mom home on hospice but are awaiting the results of the neurological tests that were done today. Fan Hoyos wants to speak with her father about this and is planning on doing so this evening. Awaiting phone call from hospice. CODE STATUS:  DNR/DNI    Disposition plan: pending family decsion bruno    Palliative care will continue to follow [place] during her hospitalization and support her and her family as they make healthcare decisions and establish goals of care.       Chan Alvarado RN, MSN  Palliative Medicine  P: [phone]

## 2021-07-15 NOTE — PROGRESS NOTES
Received report from [facility] and assumed care of patient. Assessed patient, reviewed MAR and orders. Patient resting with a NRB in place. VS stable.

## 2021-07-15 NOTE — PROGRESS NOTES
D/C Plan: [facility] vs [facility]    Reason for Admission:  Mental status changes                     RUR Score:    Low; 16%                 Plan for utilizing home health:    Vs Hospice      PCP: First and Last name:  Caro Hawkins MD     Name of Practice:    Are you a current patient: Yes/No:    Approximate date of last visit:    Can you participate in a virtual visit with your PCP:                     Current Advanced Directive/Advance Care Plan: DNR      Healthcare Decision Maker:   Click here to complete [employer] including selection of the Healthcare Decision Maker Relationship (ie \"Primary\")             Primary Decision MakerDoziel Chavez Spouse - [phone]    Secondary Decision Maker: Connie Vieira - Daughter - [phone]                  Transition of Care Plan:     [id number]  27           Chart reviewed. Per H&P \" Micaela Juarez is a 64 y.o. female with MS, bedridden, dementia,afib, decubitus ulcer presented to ER due to mental status changes and respiratory distress. Per  reported: Caregiver has been working for constipation, patient has been vomited since last Monday. . She become less responsiveness after vomiting. In ER she was found hypoxia, her T was 104  in ER, she had seizure in ER. Keppra and Ativan were administrated. CT head/abdomen/pelvic: no acute process except multiple pna. Sepsis protocol was started in ER.abg done with pH 7.35\"    Pt has been seen by palliative care and pt's daughter will be speaking with her father this evening to discuss plan of care/goals of care. CM spoke with pt's daughter, Fredo Dominguez ([phone]), to discuss transition of care. Pt's family has indicated pt was being seen by [facility] and would like to use [facility] if they decide to return home with Hospice.   CM to continue to follow and assist.      Care Management Interventions  Transition of Care Consult (CM Consult): Discharge Planning  Health Maintenance Reviewed: Yes  Current Support Network: Family Lives [place], Lives with Spouse  The Plan for Transition of Care is Related to the Following Treatment Goals :  [facility] vs [facility]  The Patient and/or Patient Representative was Provided with a Choice of Provider and Agrees with the Discharge Plan?: Yes  Freedom of Choice List was Provided with Basic Dialogue that Supports the Patient's Individualized Plan of Care/Goals, Treatment Preferences and Shares the Quality Data Associated with the Providers?: Yes  Discharge Location  Discharge Placement: Home with hospice ( [facility] vs [facility])

## 2021-07-15 NOTE — DIABETES MGMT
GLYCEMIC CONTROL PROGRESS NOTE:    - chart reviewed, no known h/o DM, HbA1C pendiing  - BG out of target range ICU: 140-180 mg/dL  - - Humalog Normal Insulin Sensitivity Corrective Coverage ordered per protocol      Recent Glucose Results:   Lab Results   Component Value Date/Time     (H) 07/15/2021 03:47 AM     (H) 07/15/2021 01:40 AM     (H) 07/14/2021 03:35 PM    GLUCPOC 166 (H) 07/15/2021 05:23 AM    GLUCPOC 187 (H) 07/14/2021 11:15 PM    GLUCPOC 222 (H) 07/14/2021 03:36 PM     Francis You MS, RN, CDE  Glycemic Control Team  [phone]  Pager [phone] (M-TH 8:00-4:30P)  *After Hours pager [phone]

## 2021-07-15 NOTE — PROGRESS NOTES
Spoke with the daughter, Cecy Chan, regarding the MRI safety sheet. Cecy Chan and her father are unsure if they want the MRI completed or not as of right now. Requested that they bring in an updated medication list, including the dosage list for the Baclofen pump.

## 2021-07-15 NOTE — PROGRESS NOTES
EEG showed no active seizure but left hemisphere intermittent sharp waves, which is consistent with history of epilepsy. MRI brain cannot be finished due to baclofen pump. Current encephalopathy is considered secondary to metabolic encephalopathy. Keppra dose is increased to 1000 mg BID. Appreciated palliative care consult. Neurology sign off.

## 2021-07-15 NOTE — HOSPICE
Hospice referral received. Chart review in process. Thank you for the referral to [address] [place] [address].  If we can be of further assistance please contact [address] [place], [address] [address] [address] [facility], [address]  [phone]  Email: [email or web address]

## 2021-07-15 NOTE — PROGRESS NOTES
Cardiology Progress Note        Patient: Bandar Swanson        Sex: female          DOA: 7/14/2021  YOB: 1960      Age:  64 y.o.        LOS:  LOS: 1 day   Assessment/Plan     Principal Problem:    Acute respiratory failure with hypoxia ([address] ()    Active Problems:    Sepsis ([address] (7/14/2021)      MS (multiple sclerosis) (HCC) ()      Seizure (HCC) ()      Pressure injury of sacral region, stage 4 (HCC) ()      Elevated troponin ()      Lactic acidosis ()      Bedridden (7/14/2021)      Pneumonia (7/14/2021)      Cachexia ([address] (7/14/2021)      Severe protein-calorie malnutrition ([address] (7/15/2021)      Hypokalemia (7/15/2021)        Plan:    Elevated troponin  Tachycardia  Sepsis      Elevated troponin most likely due to sepsis and tachycardia, no evidence of ACS  Echocardiogram stable EF and normal wall motion. Heart rate is still fast 135/per minute continue treatment of sepsis and gentle hydration  Change metoprolol to 5 mg every 6 hourly  Discussed with patient's               Subjective:    cc:  Elevated troponin  Tachycardia  Sepsis        REVIEW OF SYSTEMS:     Limited due to noncoherent      Objective:      Visit Vitals  /70 (BP 1 Location: Left arm)   Pulse 98   Temp 99.8 °F (37.7 °C)   Resp 17   Ht 5' 3\" (1.6 m)   Wt 36.3 kg (80 lb)   SpO2 100%   BMI 14.17 kg/m²     Body mass index is 14.17 kg/m². Physical Exam:  General Appearance: Nonrebreather facemask  NECK: No JVD, no thyroidomeglay. LUNGS: Clear bilaterally. HEART: S1 cardia +S2 audible,    ABD: Non-tender, BS Audible    EXT: No edema, and no cysnosis. VASCULAR EXAM: Pulses are intact.     .    Medication:  Current Facility-Administered Medications   Medication Dose Route Frequency    glucose chewable tablet 16 g  4 Tablet Oral PRN    glucagon (GLUCAGEN) injection 1 mg  1 mg IntraMUSCular PRN    dextrose (D50W) injection syrg 12.5-25 g  25-50 mL IntraVENous PRN    morphine injection 1 mg  1 mg IntraVENous Q4H PRN    insulin lispro (HUMALOG) injection   SubCUTAneous Q6H    dextrose 5% infusion  50 mL/hr IntraVENous CONTINUOUS    metoprolol (LOPRESSOR) injection 5 mg  5 mg IntraVENous Q6H    sodium chloride (NS) flush 5-40 mL  5-40 mL IntraVENous Q8H    sodium chloride (NS) flush 5-40 mL  5-40 mL IntraVENous PRN    acetaminophen (TYLENOL) tablet 650 mg  650 mg Oral Q6H PRN    Or    acetaminophen (TYLENOL) suppository 650 mg  650 mg Rectal Q6H PRN    polyethylene glycol (MIRALAX) packet 17 g  17 g Oral DAILY PRN    ondansetron (ZOFRAN ODT) tablet 4 mg  4 mg Oral Q8H PRN    Or    ondansetron (ZOFRAN) injection 4 mg  4 mg IntraVENous Q6H PRN    enoxaparin (LOVENOX) injection 40 mg  1 mg/kg SubCUTAneous Q12H    ELECTROLYTE REPLACEMENT PROTOCOL - Potassium Standard Dosing   1 Each Other PRN    ELECTROLYTE REPLACEMENT PROTOCOL - Magnesium   1 Each Other PRN    ELECTROLYTE REPLACEMENT PROTOCOL - Phosphorus  Standard Dosing  1 Each Other PRN    ELECTROLYTE REPLACEMENT PROTOCOL - Calcium   1 Each Other PRN    levETIRAcetam (KEPPRA) 1,000 mg in 0.9% sodium chloride 100 mL IVPB  1,000 mg IntraVENous Q12H    cefepime (MAXIPIME) 2 g in sterile water (preservative free) 10 mL IV syringe  2 g IntraVENous Q12H    LORazepam (ATIVAN) injection 1 mg  1 mg IntraVENous Q2H PRN    pantoprazole (PROTONIX) 40 mg in 0.9% sodium chloride 10 mL injection  40 mg IntraVENous DAILY    albuterol-ipratropium (DUO-NEB) 2.5 MG-0.5 MG/3 ML  3 mL Nebulization Q4H PRN    vancomycin (VANCOCIN) 500 mg in 0.9% sodium chloride (MBP/ADV) 100 mL MBP  500 mg IntraVENous Q24H    Vancomycin - Pharmacy to dose   1 Each Other Rx Dosing/Monitoring               Lab/Data Reviewed:  Procedures/imaging: see electronic medical records for all procedures/Xrays   and details which were not copied into this note but were reviewed prior to creation of Plan       All lab results for the last 24 hours reviewed.      Recent Labs 07/15/21  0347 07/15/21  0140 07/14/21  1535   WBC 20.5* 20.8* 17.1*   HGB 9.3* 9.6* 12.9   HCT 29.6* 30.9* 42.0   * 451* 588*     Recent Labs     07/15/21  1412 07/15/21  0347 07/15/21  0140    137 136   K 3.3* 2.9* 2.9*    101 102   CO2 28 26 29   * 157* 170*   BUN 12 14 15   CREA 0.28* 0.25* 0.30*   CA 8.9 8.3* 8.3*       RADIOLOGY:  CT Results  (Last 48 hours)               07/14/21 1757  CT CHEST ABD PELV W CONT Final result    Impression:      1. Multilobar pneumonia       2. No significant findings within the abdomen or pelvis       3. Fibroid uterus       Narrative:  EXAM: CT of the chest, abdomen, and pelvis       INDICATION: Sepsis pna, nausea and vomiting (requested by admitting team). COMPARISON: None. TECHNIQUE: Axial CT imaging of the chest, abdomen, and pelvis was performed with   intravenous contrast. Multiplanar reformats were generated. One or more dose   reduction techniques were used on this CT: automated exposure control,   adjustment of the mAs and/or kVp according to patient size, and iterative   reconstruction techniques. The specific techniques used on this CT exam have   been documented in the patient's electronic medical record. Digital Imaging and Communications in Medicine (DICOM) format image data are   available to nonaffiliated external healthcare facilities or entities on a   secure, media free, reciprocally searchable basis with patient authorization for   at least a 12 month period after this study. _______________       FINDINGS:       CHEST:       LUNGS: No suspicious nodule or mass. There is multifocal bilateral   consolidation. This is more confluent within the right upper and lower lobes. PLEURA: Normal, with no effusion or pneumothorax. AIRWAY: Normal.       MEDIASTINUM: Normal heart size. No pericardial effusion. Given the limitations   of cardiac motion, great vessels are unremarkable.        LYMPH NODES: No enlarged lymph nodes.       ===============       ABDOMEN/PELVIS:       LIVER, BILIARY: Liver attenuation is decreased suggestive of steatosis. No   biliary dilation. Gallbladder is unremarkable. PANCREAS: Normal.       SPLEEN: Normal.       ADRENALS: Normal.       KIDNEYS: Normal.       LYMPH NODES: No enlarged lymph nodes. GASTROINTESTINAL TRACT: No bowel dilation or wall thickening. PELVIC ORGANS: Fibroid uterus. VASCULATURE: Unremarkable. BONES: No acute or aggressive osseous abnormalities identified. OTHER: None.       _______________           07/14/21 1757  CT HEAD WO CONT Final result    Impression:      No acute intracranial findings       Narrative:  EXAM: CT head       INDICATION: Altered mental status       COMPARISON: None. TECHNIQUE: Axial CT imaging of the head was performed without intravenous   contrast.One or more dose reduction techniques were used on this CT: automated   exposure control, adjustment of the mAs and/or kVp according to patient size,   and iterative reconstruction techniques. The specific techniques used on this   CT exam have been documented in the patient's electronic medical record. Digital Imaging and Communications in Medicine (DICOM) format image data are   available to nonaffiliated external healthcare facilities or entities on a   secure, media free, reciprocally searchable basis with patient authorization for   at least a 12 month period after this study. _______________       FINDINGS:       BRAIN AND POSTERIOR FOSSA: The sulci, folia, ventricles and basal cisterns are   within normal limits for the patient's age. There is no intracranial hemorrhage,   mass effect, or midline shift. There are areas of decreased attenuation within   the periventricular and subcortical white matter. EXTRA-AXIAL SPACES AND MENINGES: There are no abnormal extra-axial fluid   collections. CALVARIUM: Intact. SINUSES: Clear. OTHER: None.       _______________               CXR Results  (Last 48 hours)               07/14/21 1629  XR CHEST PORT Final result    Impression:      Bilateral patchy parenchymal/interstitial opacities, right greater left. May   reflect atypical infection. Narrative:  EXAM: XR CHEST PORT       CLINICAL INDICATION/HISTORY: respiratory failure   -Additional: None       COMPARISON: None       TECHNIQUE: Portable frontal view of the chest       _______________       FINDINGS:       SUPPORT DEVICES: None. HEART AND MEDIASTINUM: Cardiomediastinal silhouette within normal limits. LUNGS AND PLEURAL SPACES: Bilateral patchy parenchymal/interstitial opacities,   right greater left.  No large effusion or pneumothorax.       _______________                   Cardiology Procedures:   Results for orders placed or performed during the hospital encounter of 07/14/21   EKG, 12 LEAD, INITIAL   Result Value Ref Range    Ventricular Rate 162 BPM    Atrial Rate 162 BPM    P-R Interval 96 ms    QRS Duration 72 ms    Q-T Interval 312 ms    QTC Calculation (Bezet) 512 ms    Calculated P Axis 67 degrees    Calculated R Axis -103 degrees    Calculated T Axis 88 degrees    Diagnosis       Sinus tachycardia with short GA  Right superior axis deviation  Right ventricular hypertrophy  Nonspecific ST and T wave abnormality  Abnormal ECG  No previous ECGs available  Confirmed by Vicki Tam MD. ([id number]) on 7/14/2021 9:56:18 PM        Echo Results  (Last 48 hours)    None       Cardiolite (Tc-99m Sestamibi) stress test    Signed By: Bria Hernandez MD     July 15, 2021

## 2021-07-15 NOTE — PROCEDURES
ELECTROENCEPHALOGRAPHY     Patient: Jane Euceda MRN: [id number]  CSN: [id number]    YOB: 1960  Age: 64 y.o. Sex: female    DOA: 7/14/2021 LOS:  LOS: 1 day        Date of Service: 7/15/2021    DICTATING: Xena Bowling MD     REFERRING PHYSICIAN: Dr. Mady Olverau: 21-48    HISTORY OF PRESENT ILLNESS: This is a 63 YO female with past medical history of advanced MS who presented with altered mentation and aspiration pneumonia. This EEG was performed in order to assess electrodiagnostic risk factors for epilepsy. ELECTROENCEPHALOGRAM INTERPRETATION: This is a referential and bipolar EEG recorded with a 10-20 system. EEG background showed generalized 2-3 hertz delta activity intermixed with 4 to 7 hertz theta activity intermixed with the background. There is no posterior dominant rhythm. Photic stimulation did not elicit  any abnormal activity. There are left hemisphere intermittent frontotemporal sharp waves. IMPRESSION: This EEG is abnormal due to diffuse generalized slowing, which is an indicator of diffuse cerebral dysfunction from any cause including -but not limited to- toxic, metabolic and infectious etiologies. There are left hemisphere intermittent sharp waves in left frontotemporal regions, which is consistent with history of epilepsy. No active seizure on EEG. Signed:   Xena Bowling MD  7/15/2021  4:21 PM

## 2021-07-15 NOTE — PROGRESS NOTES
Pharmacy Dosing Services: Vancomycin     Consult for Vancomycin Dosing by Pharmacy by Dr. Reno Ashby provided for this 64y.o. year old female , for indication of CAP x 7 days. Day of Therapy 1    Ht Readings from Last 1 Encounters:   07/14/21 160 cm (63\")        Wt Readings from Last 1 Encounters:   07/14/21 33.7 kg (74 lb 4.7 oz)      Previous Regimen Vancomycin 1 gm x 1 dose in ED ~ 1714   Other Current Antibiotics Cefepime    Serum Creatinine Lab Results   Component Value Date/Time    Creatinine 0.68 07/14/2021 03:35 PM    Creatinine (POC) 0.73 07/14/2021 03:36 PM      Creatinine Clearance Estimated Creatinine Clearance: 44.9 mL/min (by C-G formula based on SCr of 0.68 mg/dL). BUN Lab Results   Component Value Date/Time    BUN 18 07/14/2021 03:35 PM      WBC Lab Results   Component Value Date/Time    WBC 17.1 (H) 07/14/2021 03:35 PM      H/H Lab Results   Component Value Date/Time    HGB 12.9 07/14/2021 03:35 PM      Platelets Lab Results   Component Value Date/Time    PLATELET 773 (H) 09/28/8976 03:35 PM      Temp 98.2 °F (36.8 °C)     Weight verified as 33.7 kg ( ~ 74 pounds ) per nurse   Vancomycin 1 gm IVPB x 1 dose in ED ~ 1714  Vancomycin 500 mg IVPB q24h to start tomorrow 7/15/21 @ 1700. Dose calculated to approximate a therapeutic trough of ~ 15 mcg/mL. Pharmacy to follow daily and will make changes to dose and/or frequency based on clinical status.     Pharmacist Mayela Trimble, [facility]

## 2021-07-15 NOTE — PROGRESS NOTES
Comprehensive Nutrition Assessment    Type and Reason for Visit: Initial (BMI 14)    Nutrition Recommendations/Plan: Diet: advance diet per MD when clinically possible to avoid prolong NPO status. Nutrition Assessment:  Pt admitted w/ acute respiratory failure w/ hypoxia, aspiration PNA, h/o MS, seizure, sepsis, bedridden, cachexiz, pressure injury. 07/15/21 0952   Malnutrition Assessment   Context of Malnutrition Chronic illness   Chronic Illness - Weight Loss 7.00 - Greater than 10% over 6 months   Chronic Illness - Body Fat Loss 7 - Severe body fat loss   Chronic Illness - Body Fat Loss Locations Buccal region;Orbital;Triceps   Chronic Illness - Muscle Mass Loss 7 - Severe muscle mass loss   Chronic Illness - Muscle Mass Location Temples (temporalis); Clavicles (pectoralis &deltoids); Hand (interosseous)   Chronic Illness - Fluid Accumulation Unable to assess   Chronic Illness -  Strength Not performed   Chronic Illness - Malnutrition Score  21   Malnutrition Status Severe malnutrition     Estimated Daily Nutrient Needs:  Energy (kcal): 3957-2660; Weight Used for Energy Requirements: Current  Protein (g): 36-44; Weight Used for Protein Requirements: Current (1-1.2g)  Fluid (ml/day): 3318-9006; Method Used for Fluid Requirements: 1 ml/kcal    Nutrition Related Findings:  Labs: K 2.9, glucose 157. Med: protonix, KCl. No BM at this time. Spoke w/ pt's nurse-stated pt's  would blend her food and would eat 2x per day.       Wounds:    Multiple, Pressure injury, Stage IV       Current Nutrition Therapies:  DIET NPO    Anthropometric Measures:  · Height:  5' 3\" (160 cm)  · Current Body Wt:  36.3 kg (80 lb)   · Admission Body Wt:  74 lb 4.7 oz (H&P)    · Usual Body Wt:  40.8 kg (89 lb 15.2 oz) (1/29/2021 (-11.1% in 6 months))     · Ideal Body Wt:  115 lbs:  69.6 %   · Adjusted Body Weight:   ; Weight Adjustment for: No adjustment   · Adjusted BMI:       · BMI Category:  Underweight (BMI less than 18.5) Nutrition Diagnosis:   · Inadequate oral intake related to impaired respiratory function as evidenced by NPO or clear liquid status due to medical condition    · Severe malnutrition related to catabolic illness as evidenced by severe loss of subcutaneous fat, severe muscle loss    Nutrition Interventions:   Food and/or Nutrient Delivery: Start oral diet, Start tube feeding  Nutrition Education and Counseling: No recommendations at this time  Coordination of Nutrition Care: Continue to monitor while inpatient    Goals:  Start PO diet to meet nutritional needs within the next 4 days       Nutrition Monitoring and Evaluation:   Behavioral-Environmental Outcomes: None identified  Food/Nutrient Intake Outcomes: Diet advancement/tolerance  Physical Signs/Symptoms Outcomes: Biochemical data, Skin, Weight, GI status, Nausea/vomiting, Nutrition focused physical findings    Discharge Planning:     Too soon to determine     Electronically signed by Rob Mosley RD on 7/15/2021 at 9:54 AM

## 2021-07-15 NOTE — PROGRESS NOTES
[facility]  Pulmonary, Critical Care, and Sleep Medicine    Name: Ramiro Bansal MRN: [id number]   : 1960 Hospital: [facility] [place]   Date: 7/15/2021        Pulmonary Critical Care Note    IMPRESSION:   · Acute hypoxic respiratory failure · J96.01 ·   Aspiration pneumonia · J69.0 ·   Sepsis with organ dysfunction · A41.9, R65.20 ·   Leukocytosis · D72.829 ·   Elevated troponin · R77.8 ·   Lactic acidosis · E87.2 ·   Seizure d/o ·  ·   Multiple sclerosis with spastic paralysis ·      Patient Active Problem List   Diagnosis Code    Sepsis ([address] A41.9    MS (multiple sclerosis) ([address] G35    Seizure ([address] R56.9    Pressure injury of sacral region, stage 4 ([address] L89.154    Acute respiratory failure with hypoxia ([address] J96.01    Elevated troponin R77.8    Lactic acidosis E87.2    Bedridden Z74.01    Pneumonia J18.9    Cachexia (HCC) R64    Severe protein-calorie malnutrition (HCC) E43    Hypokalemia E87.6      RECOMMENDATIONS:   Respiratory: Supplemental O2 via NRBM, titrate flow, fio2 for goal SPO2> 91%  CT chest 21 - Multilobar pneumonia likely from aspiration pneumonia  Aspiration prevention bundle followed, head of the bed at 30' all times  Bronchodilators as needed, pulmonary hygiene care    ID: Sepsis bundle per hospital protocol  Antibiotic choice: cefepime, Vancomycin; PCN allergy - tolerating cefepime  Blood Cultures 2021NGTD  Urine culture 2021in process  Deescalate antibiotic when appropriate. Lactic acid - initial elevated and repeat normalized. CT chest with multi lobar pneumonia likely from aspiration contributing to sepsis  CT abdomen, pelvis - nil acute  Role of decub ulcer infection likely - wound care consulted    CVS: Monitor Hemodynamics - stable  Cardiology following - elevated troponin demand related per cardiology  ECHO 7/15/21  · LV: Estimated LVEF is 55 - 60%.  Normal cavity size, wall thickness and systolic function (ejection fraction normal). Mild (grade 1) left ventricular diastolic dysfunction E/E' ratio = 8.02.  · RV: Borderline low systolic function. · Tricuspid regurgitation is inadequate for estimation of right ventricular systolic pressure    Renal: Monitor S. Na+, change IV fluid to D5 at 50 mL an hour  Repeat BMP today and further adjustment per results  Monitor renal functions, lytes, urine output  Replace lytes per unit protocol    Heme: Monitor CBC -drop in hemoglobin expected and dilutional  No evidence of active bleeding  Endo: Glycemic control as needed  GI: diet - NPO  Neurology: non-verbal, lethargic, does not follow commands  CT head 7/14/21 - nil acute    Monitor in ICU  Will defer respective systems problem management to primary and other consultant and follow patient in ICU with primary and other medical team  Further recommendations will be based on the patient's response to recommended treatment and results of the investigation ordered. Quality Care: PPI, DVT prophylaxis, HOB elevated, Infection control all reviewed and addressed. PAIN AND SEDATION: judicious opiate; avoid sedative  Skin/Wound: skin breakdowns LT hip, sacrum  Prophylaxis: DVT and GI Prophylaxis reviewed. Restraints: none   PT/OT eval and treat: as needed when stable   Lines/Tubes: PIV     ADVANCE DIRECTIVE: DNR. Palliative care consulted  DISCUSSION: Events and notes from last 24 hours reviewed. Care plan discussed with nursing, hospitalist, RT  D/w family-daughter at the bedside (answered all questions to satisfaction). Quality Care: PPI, DVT prophylaxis, HOB elevated, Infection control all reviewed and addressed. High complexity decision making was performed during the evaluation of this patient at high risk for decompensation with multiple organ involvement.     Total critical care time spent rendering complex decision making and > 50% time spent in face to face evaluation exclusive of procedures/family discussion/coordination of care: 36 minutes. Subjective/History:   Ms. Shivani Orlando has been seen and evaluated as Dr. Abbe Severance requested now for assisting in ICU care for Acute hypoxic respiratory failure, aspiration pneumonia with sepsis with organ dysfunction. Patient is a 64 y.o. female with PMHx for MS, seizure d/o, decub ulcers presented from home via EMS for progressive change in mentation and lethargy, poor appetite, increased work of breathing, fever, vomiting, episode of aspiration. The patient is critically ill and can not provide additional history due to unable to comprehend. Information limited and provided by . Pt is bed bound at home from MS; feeds orally, able to smile back and may say \"hi\" back once in while otherwise non-verbal. In Er, pt noted to be hypoxic requiring NRBM for increased work of breathing and hypoxia. XR chest showed bibasilar infiltrates. Labs showed leukocytosis, elevated lactic acid, troponin, pro-BNP. Pt is admitted to ICU for close monitoring. She is DNR/DNI per ER provider and . Other information is limited. 07/15/21  Patient seen at bedside in ICU room #105  Remain on nonrebreather mask with SPO2 more than 95% on monitor  Heart rate improving; tolerating metoprolol 2.5 mg every 6  Hemodynamically stable  Received NS at low rate; S. Na+ improving  Electrolytes being replaced  Afebrile. Low urine output  No episode of nausea or vomiting in ICU  Has baclofen pump; EEG done today  No clinical evidence of bleeding externally anywhere  [facility] was not contacted by staff on anything about patient overnight. Review of Systems:  Review of systems not obtained due to patient factors. Past Medical History:  Past Medical History:   Diagnosis Date    MS (multiple sclerosis) [address] [facility]         Past Surgical History:  No past surgical history on file.      Medications:  Prior to Admission medications    Not on File       Current Facility-Administered Medications   Medication Dose Route Frequency  insulin lispro (HUMALOG) injection   SubCUTAneous Q6H    sodium chloride (NS) flush 5-40 mL  5-40 mL IntraVENous Q8H    enoxaparin (LOVENOX) injection 40 mg  1 mg/kg SubCUTAneous Q12H    0.9% sodium chloride infusion  100 mL/hr IntraVENous CONTINUOUS    levETIRAcetam (KEPPRA) 1,000 mg in 0.9% sodium chloride 100 mL IVPB  1,000 mg IntraVENous Q12H    cefepime (MAXIPIME) 2 g in sterile water (preservative free) 10 mL IV syringe  2 g IntraVENous Q12H    pantoprazole (PROTONIX) 40 mg in 0.9% sodium chloride 10 mL injection  40 mg IntraVENous DAILY    vancomycin (VANCOCIN) 500 mg in 0.9% sodium chloride (MBP/ADV) 100 mL MBP  500 mg IntraVENous Q24H    Vancomycin - Pharmacy to dose   1 Each Other Rx Dosing/Monitoring    metoprolol (LOPRESSOR) injection 2.5 mg  2.5 mg IntraVENous Q6H       Allergy:  Allergies   Allergen Reactions    Pcn [Penicillins] Unknown (comments)        Social History:  Social History     Tobacco Use    Smoking status: Not on file   Substance Use Topics    Alcohol use: Not on file    Drug use: Not on file        Family History:  No family history on file. Objective:   Vital Signs:    Blood pressure 125/70, pulse 98, temperature 98.4 °F (36.9 °C), resp. rate 17, height 5' 3\" (1.6 m), weight 36.3 kg (80 lb), SpO2 100 %. Body mass index is 14.17 kg/m².    O2 Device: Non-rebreather mask   O2 Flow Rate (L/min): 10 l/min   Temp (24hrs), Av.9 °F (37.2 °C), Min:97.2 °F (36.2 °C), Max:104.4 °F (40.2 °C)         Intake/Output:   Last shift:      07/15 0701 - 07/15 1900  In: 1291.2 [I.V.:1291.2]  Out: 50 [Urine:50]  Last 3 shifts: 1901 - 07/15 0700  In: -   Out: 150 [Urine:150]    Intake/Output Summary (Last 24 hours) at 7/15/2021 1300  Last data filed at 7/15/2021 0800  Gross per 24 hour   Intake 1291.23 ml   Output 200 ml   Net 1091.23 ml         Physical Exam:  General: awake looking, lethargic, doesn't follow commands, chronically ill and cachectic, appears older than stated age, on NRBM  HEENT: PERRL, fundi benign, visible oral mucosa dry  Neck: No abnormally enlarged lymph nodes or thyroid, supple  Chest: normal  Lungs: moderate air entry, few rhonchi scattered bilaterally, normal percussion anterior chest wall bilaterally, no tenderness/ rash  Heart: Regular rate and rhythm, S1S2 present or without murmur or extra heart sounds  Abdomen: non distended, bowel sounds normoactive, tympanic, abdomen is soft without significant tenderness, masses, organomegaly or guarding, rigidity, rebound  Extremity: no edema, cyanosis, clubbing; LT sacral large decub wound with some necrotic tissue  Capillary refill: normal  Neuro: lethargic, doesn't follow any commands, non-verbal, exam limitations  Skin: Skin color, texture, turgor fair    Data:     Recent Results (from the past 24 hour(s))   EKG, 12 LEAD, INITIAL    Collection Time: 07/14/21  3:29 PM   Result Value Ref Range    Ventricular Rate 162 BPM    Atrial Rate 162 BPM    P-R Interval 96 ms    QRS Duration 72 ms    Q-T Interval 312 ms    QTC Calculation (Bezet) 512 ms    Calculated P Axis 67 degrees    Calculated R Axis -103 degrees    Calculated T Axis 88 degrees    Diagnosis       Sinus tachycardia with short DC  Right superior axis deviation  Right ventricular hypertrophy  Nonspecific ST and T wave abnormality  Abnormal ECG  No previous ECGs available  Confirmed by Jose Springer MD. ([id number]) on 7/14/2021 9:56:18 PM     CBC WITH AUTOMATED DIFF    Collection Time: 07/14/21  3:35 PM   Result Value Ref Range    WBC 17.1 (H) 4.6 - 13.2 K/uL    RBC 4.65 4.20 - 5.30 M/uL    HGB 12.9 12.0 - 16.0 g/dL    HCT 42.0 35.0 - 45.0 %    MCV 90.3 74.0 - 97.0 FL    MCH 27.7 24.0 - 34.0 PG    MCHC 30.7 (L) 31.0 - 37.0 g/dL    RDW 16.1 (H) 11.6 - 14.5 %    PLATELET 009 (H) 901 - 420 K/uL    MPV 9.3 9.2 - 11.8 FL    NEUTROPHILS 74 (H) 40 - 73 %    BAND NEUTROPHILS 14 (H) 0 - 5 %    LYMPHOCYTES 9 (L) 21 - 52 %    MONOCYTES 3 3 - 10 %    EOSINOPHILS 0 0 - 5 % BASOPHILS 0 0 - 2 %    ABS. NEUTROPHILS 15.1 (H) 1.8 - 8.0 K/UL    ABS. LYMPHOCYTES 1.5 0.9 - 3.6 K/UL    ABS. MONOCYTES 0.5 0.05 - 1.2 K/UL    ABS. EOSINOPHILS 0.0 0.0 - 0.4 K/UL    ABS. BASOPHILS 0.0 0.0 - 0.1 K/UL    DF MANUAL      PLATELET COMMENTS Increased Platelets      RBC COMMENTS NORMOCYTIC, NORMOCHROMIC      WBC COMMENTS VACUOLATED POLYS     METABOLIC PANEL, COMPREHENSIVE    Collection Time: 07/14/21  3:35 PM   Result Value Ref Range    Sodium 129 (L) 136 - 145 mmol/L    Potassium 4.6 3.5 - 5.5 mmol/L    Chloride 94 (L) 100 - 111 mmol/L    CO2 24 21 - 32 mmol/L    Anion gap 11 3.0 - 18 mmol/L    Glucose 182 (H) 74 - 99 mg/dL    BUN 18 7.0 - 18 MG/DL    Creatinine 0.68 0.6 - 1.3 MG/DL    BUN/Creatinine ratio 26 (H) 12 - 20      GFR est AA >60 >60 ml/min/1.73m2    GFR est non-AA >60 >60 ml/min/1.73m2    Calcium 10.3 (H) 8.5 - 10.1 MG/DL    Bilirubin, total 0.6 0.2 - 1.0 MG/DL    ALT (SGPT) 12 (L) 13 - 56 U/L    AST (SGOT) 26 10 - 38 U/L    Alk.  phosphatase 115 45 - 117 U/L    Protein, total 9.5 (H) 6.4 - 8.2 g/dL    Albumin 2.7 (L) 3.4 - 5.0 g/dL    Globulin 6.8 (H) 2.0 - 4.0 g/dL    A-G Ratio 0.4 (L) 0.8 - 1.7     NT-PRO BNP    Collection Time: 07/14/21  3:35 PM   Result Value Ref Range    NT pro-BNP 6,289 (H) 0 - 900 PG/ML   MAGNESIUM    Collection Time: 07/14/21  3:35 PM   Result Value Ref Range    Magnesium 2.3 1.6 - 2.6 mg/dL   PHOSPHORUS    Collection Time: 07/14/21  3:35 PM   Result Value Ref Range    Phosphorus 3.7 2.5 - 4.9 MG/DL   PROTHROMBIN TIME + INR    Collection Time: 07/14/21  3:35 PM   Result Value Ref Range    Prothrombin time 16.0 (H) 11.5 - 15.2 sec    INR 1.3 (H) 0.8 - 1.2     BLOOD GAS,CHEM8,LACTIC ACID POC    Collection Time: 07/14/21  3:36 PM   Result Value Ref Range    Calcium, ionized (POC) 1.24 1.12 - 1.32 mmol/L    Base deficit (POC) 0.2 mmol/L    HCO3 (POC) 26.1 (H) 22 - 26 MMOL/L    CO2, POC 27 (H) 19 - 24 MMOL/L    O2 SAT 52 %    Sample source VENOUS BLOOD      Performed by Fadumo Major     Sodium (POC) 132 (L) 136 - 145 mmol/L    Potassium (POC) 5.6 (H) 3.5 - 5.1 mmol/L    Glucose (POC) 222 (H) 65 - 100 mg/dL    Creatinine (POC) 0.73 0.6 - 1.3 mg/dL    Lactic Acid (POC) 4.89 (HH) 0.40 - 2.00 mmol/L    Chloride (POC) 93 (L) 98 - 107 mmol/L    Anion gap, POC 13 10 - 20      GFRAA, POC >60 >60 ml/min/1.73m2    GFRNA, POC >60 >60 ml/min/1.73m2    pH, venous (POC) 7.35 7.32 - 7.42      pCO2, venous (POC) 46.9 41 - 51 MMHG    pO2, venous (POC) 29 25 - 40 mmHg   CULTURE, BLOOD    Collection Time: 07/14/21  3:39 PM    Specimen: Blood   Result Value Ref Range    Special Requests: NO SPECIAL REQUESTS      Culture result: NO GROWTH AFTER 14 HOURS     CULTURE, BLOOD    Collection Time: 07/14/21  3:45 PM    Specimen: Blood   Result Value Ref Range    Special Requests: NO SPECIAL REQUESTS      Culture result: NO GROWTH AFTER 14 HOURS     TROPONIN I    Collection Time: 07/14/21  3:45 PM   Result Value Ref Range    Troponin-I, QT 1.16 (HH) 0.0 - 0.045 NG/ML   POC LACTIC ACID    Collection Time: 07/14/21  4:13 PM   Result Value Ref Range    Lactic Acid (POC) 11.57 (HH) 0.40 - 2.00 mmol/L   COVID-19 RAPID TEST    Collection Time: 07/14/21  4:50 PM   Result Value Ref Range    Specimen source NASAL      COVID-19 rapid test Not detected NOTD     CARDIAC PANEL,(CK, CKMB & TROPONIN)    Collection Time: 07/14/21  8:35 PM   Result Value Ref Range    CK - MB 3.1 <3.6 ng/ml    CK-MB Index 4.0 0.0 - 4.0 %    CK 78 26 - 192 U/L    Troponin-I, QT 1.64 (HH) 0.0 - 0.045 NG/ML   LACTIC ACID    Collection Time: 07/14/21  9:00 PM   Result Value Ref Range    Lactic acid 3.5 (HH) 0.4 - 2.0 MMOL/L   GLUCOSE, POC    Collection Time: 07/14/21 11:15 PM   Result Value Ref Range    Glucose (POC) 187 (H) 70 - 086 mg/dL   METABOLIC PANEL, BASIC    Collection Time: 07/15/21  1:40 AM   Result Value Ref Range    Sodium 136 136 - 145 mmol/L    Potassium 2.9 (LL) 3.5 - 5.5 mmol/L    Chloride 102 100 - 111 mmol/L    CO2 29 21 - 32 mmol/L    Anion gap 5 3.0 - 18 mmol/L    Glucose 170 (H) 74 - 99 mg/dL    BUN 15 7.0 - 18 MG/DL    Creatinine 0.30 (L) 0.6 - 1.3 MG/DL    BUN/Creatinine ratio 50 (H) 12 - 20      GFR est AA >60 >60 ml/min/1.73m2    GFR est non-AA >60 >60 ml/min/1.73m2    Calcium 8.3 (L) 8.5 - 10.1 MG/DL   PHOSPHORUS    Collection Time: 07/15/21  1:40 AM   Result Value Ref Range    Phosphorus 1.7 (L) 2.5 - 4.9 MG/DL   MAGNESIUM    Collection Time: 07/15/21  1:40 AM   Result Value Ref Range    Magnesium 1.9 1.6 - 2.6 mg/dL   LACTIC ACID    Collection Time: 07/15/21  1:40 AM   Result Value Ref Range    Lactic acid 1.4 0.4 - 2.0 MMOL/L   CARDIAC PANEL,(CK, CKMB & TROPONIN)    Collection Time: 07/15/21  1:40 AM   Result Value Ref Range    CK - MB 2.6 <3.6 ng/ml    CK-MB Index 5.5 (H) 0.0 - 4.0 %    CK 47 26 - 192 U/L    Troponin-I, QT 1.16 (HH) 0.0 - 0.045 NG/ML   CALCIUM, IONIZED    Collection Time: 07/15/21  1:40 AM   Result Value Ref Range    Ionized Calcium 1.19 1.12 - 1.32 MMOL/L   CBC W/O DIFF    Collection Time: 07/15/21  1:40 AM   Result Value Ref Range    WBC 20.8 (H) 4.6 - 13.2 K/uL    RBC 3.45 (L) 4.20 - 5.30 M/uL    HGB 9.6 (L) 12.0 - 16.0 g/dL    HCT 30.9 (L) 35.0 - 45.0 %    MCV 89.6 74.0 - 97.0 FL    MCH 27.8 24.0 - 34.0 PG    MCHC 31.1 31.0 - 37.0 g/dL    RDW 15.9 (H) 11.6 - 14.5 %    PLATELET 064 (H) 383 - 420 K/uL    MPV 9.0 (L) 9.2 - 11.8 FL   CBC WITH AUTOMATED DIFF    Collection Time: 07/15/21  3:47 AM   Result Value Ref Range    WBC 20.5 (H) 4.6 - 13.2 K/uL    RBC 3.30 (L) 4.20 - 5.30 M/uL    HGB 9.3 (L) 12.0 - 16.0 g/dL    HCT 29.6 (L) 35.0 - 45.0 %    MCV 89.7 74.0 - 97.0 FL    MCH 28.2 24.0 - 34.0 PG    MCHC 31.4 31.0 - 37.0 g/dL    RDW 15.9 (H) 11.6 - 14.5 %    PLATELET 570 (H) 598 - 420 K/uL    MPV 9.1 (L) 9.2 - 11.8 FL    NEUTROPHILS 69 40 - 73 %    BAND NEUTROPHILS 23 (H) 0 - 5 %    LYMPHOCYTES 5 (L) 21 - 52 %    MONOCYTES 3 3 - 10 %    EOSINOPHILS 0 0 - 5 %    BASOPHILS 0 0 - 2 %    ABS.  NEUTROPHILS 18.9 (H) 1.8 - 8.0 K/UL    ABS. LYMPHOCYTES 1.0 0.9 - 3.6 K/UL    ABS. MONOCYTES 0.6 0.05 - 1.2 K/UL    ABS. EOSINOPHILS 0.0 0.0 - 0.4 K/UL    ABS.  BASOPHILS 0.0 0.0 - 0.1 K/UL    DF MANUAL      PLATELET COMMENTS Increased Platelets      RBC COMMENTS NORMOCYTIC, NORMOCHROMIC      WBC COMMENTS VACUOLATED POLYS     METABOLIC PANEL, BASIC    Collection Time: 07/15/21  3:47 AM   Result Value Ref Range    Sodium 137 136 - 145 mmol/L    Potassium 2.9 (LL) 3.5 - 5.5 mmol/L    Chloride 101 100 - 111 mmol/L    CO2 26 21 - 32 mmol/L    Anion gap 10 3.0 - 18 mmol/L    Glucose 157 (H) 74 - 99 mg/dL    BUN 14 7.0 - 18 MG/DL    Creatinine 0.25 (L) 0.6 - 1.3 MG/DL    BUN/Creatinine ratio 56 (H) 12 - 20      GFR est AA >60 >60 ml/min/1.73m2    GFR est non-AA >60 >60 ml/min/1.73m2    Calcium 8.3 (L) 8.5 - 10.1 MG/DL   GLUCOSE, POC    Collection Time: 07/15/21  5:23 AM   Result Value Ref Range    Glucose (POC) 166 (H) 70 - 110 mg/dL   ECHO ADULT COMPLETE    Collection Time: 07/15/21  8:37 AM   Result Value Ref Range    IVSd 0.92 (A) 0.60 - 0.90 cm    LVIDd 3.70 (A) 3.90 - 5.30 cm    LVIDs 2.83 cm    LVOT d 2.01 cm    LVPWd 0.90 0.60 - 0.90 cm    BP EF 57.9 55.0 - 100.0 %    LV Ejection Fraction MOD 2C 55 %    LV Ejection Fraction MOD 4C 60 %    LV ED Vol A2C 38.00 ml    LV ED Vol A4C 57.00 ml    LV ED Vol BP 47.33 (A) 56.0 - 104.0 ml    LV ES Vol A2C 16.99 ml    LV ES Vol A4C 23.08 ml    LV ES Vol BP 19.95 19.0 - 49.0 ml    LVOT SV 21.0 ml    LVOT Cardiac Output 3.70 l/min    LVOT Peak Gradient 2.20 mmHg    Left Ventricular Outflow Tract Mean Gradient 1.09 mmHg    LVOT SV 33.0 ml    LVOT Peak Velocity 74.00 cm/s    LVOT VTI 10.41 cm    RVIDd 3.05 cm    LA Vol 4C 30.09 22.00 - 52.00 ml    Right Atrial Area 4C 7.72 cm2    AV Annulus 3.32 cm    Aortic Valve Area by Continuity of Peak Velocity 2.54 cm2    Aortic Valve Area by Continuity of VTI 2.17 cm2    AoV PG 3.44 mmHg    Aortic Valve Systolic Mean Gradient 5.62 mmHg    Aortic Valve Systolic Peak Velocity 93.00 cm/s    AoV VTI 15.24 cm    MV A Michael 74.00 cm/s    Mitral Valve E Wave Deceleration Time 147.08 ms    MV E Michael 55.00 cm/s    Mitral Valve Pressure Half-time 42.65 ms    MVA (PHT) 5.16 cm2    LV E' Septal Velocity 6.00 cm/s    LV E' Lateral Velocity 8.00 cm/s    MV E/A 0.74     LV Mass AL 98.4 67.0 - 162.0 g    LV Mass AL Index 75.1 43.0 - 95.0 g/m2    E/E' lateral 6.88     E/E' septal 9.17     E/E' ratio (averaged) 8.02     Tapse 1.28 1.50 - 2.00 cm    MU/BSA Pk Michael 1.9 cm2/m2    MU/BSA VTI 1.7 cm2/m2   CALCIUM, IONIZED    Collection Time: 07/15/21  9:30 AM   Result Value Ref Range    Ionized Calcium 1.17 1.12 - 1.32 MMOL/L   CARDIAC PANEL,(CK, CKMB & TROPONIN)    Collection Time: 07/15/21  9:30 AM   Result Value Ref Range    CK - MB 2.7 <3.6 ng/ml    CK-MB Index 7.3 (H) 0.0 - 4.0 %    CK 37 26 - 192 U/L    Troponin-I, QT 0.59 (H) 0.0 - 0.045 NG/ML   LACTIC ACID    Collection Time: 07/15/21  9:30 AM   Result Value Ref Range    Lactic acid 1.0 0.4 - 2.0 MMOL/L   GLUCOSE, POC    Collection Time: 07/15/21 12:00 PM   Result Value Ref Range    Glucose (POC) 105 70 - 110 mg/dL           Recent Labs     07/14/21  1536   HCO3I 26.1*       All Micro Results     Procedure Component Value Units Date/Time    CULTURE, BLOOD [[id number]] Collected: 07/14/21 1539    Order Status: Completed Specimen: Blood Updated: 07/15/21 0717     Special Requests: NO SPECIAL REQUESTS        Culture result: NO GROWTH AFTER 14 HOURS       CULTURE, BLOOD [[id number]] Collected: 07/14/21 1545    Order Status: Completed Specimen: Blood Updated: 07/15/21 0717     Special Requests: NO SPECIAL REQUESTS        Culture result: NO GROWTH AFTER 14 HOURS       CULTURE, URINE [[id number]] Collected: 07/14/21 1840    Order Status: Completed Specimen: Cath Urine Updated: 07/14/21 2110    COVID-19 RAPID TEST [[id number]] Collected: 07/14/21 1650    Order Status: Completed Specimen: Nasopharyngeal Updated: 07/14/21 1722     Specimen source NASAL COVID-19 rapid test Not detected        Comment: Rapid Abbott ID Now       Rapid NAAT:  The specimen is NEGATIVE for SARS-CoV-2, the novel coronavirus associated with COVID-19. Negative results should be treated as presumptive and, if inconsistent with clinical signs and symptoms or necessary for patient management, should be tested with an alternative molecular assay. Negative results do not preclude SARS-CoV-2 infection and should not be used as the sole basis for patient management decisions. This test has been authorized by the FDA under an Emergency Use Authorization (EUA) for use by authorized laboratories. Fact sheet for Healthcare Providers: [employer]  Fact sheet for Patients: [employer]       Methodology: Isothermal Nucleic Acid Amplification               Telemetry: normal sinus rhythm      Imaging:  [x]I have personally reviewed the patients chest radiographs images and report     Results from Hospital Encounter encounter on 07/14/21    XR CHEST PORT    Narrative  EXAM: XR CHEST PORT    CLINICAL INDICATION/HISTORY: respiratory failure  -Additional: None    COMPARISON: None    TECHNIQUE: Portable frontal view of the chest    _______________    FINDINGS:    SUPPORT DEVICES: None. HEART AND MEDIASTINUM: Cardiomediastinal silhouette within normal limits. LUNGS AND PLEURAL SPACES: Bilateral patchy parenchymal/interstitial opacities,  right greater left. No large effusion or pneumothorax.    _______________    Impression  Bilateral patchy parenchymal/interstitial opacities, right greater left. May  reflect atypical infection. Results from Hospital Encounter encounter on 07/14/21    CT CHEST ABD PELV W CONT    Narrative  EXAM: CT of the chest, abdomen, and pelvis    INDICATION: Sepsis pna, nausea and vomiting (requested by admitting team). COMPARISON: None.     TECHNIQUE: Axial CT imaging of the chest, abdomen, and pelvis was performed with  intravenous contrast. Multiplanar reformats were generated. One or more dose  reduction techniques were used on this CT: automated exposure control,  adjustment of the mAs and/or kVp according to patient size, and iterative  reconstruction techniques. The specific techniques used on this CT exam have  been documented in the patient's electronic medical record. Digital Imaging and Communications in Medicine (DICOM) format image data are  available to nonaffiliated external healthcare facilities or entities on a  secure, media free, reciprocally searchable basis with patient authorization for  at least a 12 month period after this study. _______________    FINDINGS:    CHEST:    LUNGS: No suspicious nodule or mass. There is multifocal bilateral  consolidation. This is more confluent within the right upper and lower lobes. PLEURA: Normal, with no effusion or pneumothorax. AIRWAY: Normal.    MEDIASTINUM: Normal heart size. No pericardial effusion. Given the limitations  of cardiac motion, great vessels are unremarkable. LYMPH NODES: No enlarged lymph nodes.    ===============    ABDOMEN/PELVIS:    LIVER, BILIARY: Liver attenuation is decreased suggestive of steatosis. No  biliary dilation. Gallbladder is unremarkable. PANCREAS: Normal.    SPLEEN: Normal.    ADRENALS: Normal.    KIDNEYS: Normal.    LYMPH NODES: No enlarged lymph nodes. GASTROINTESTINAL TRACT: No bowel dilation or wall thickening. PELVIC ORGANS: Fibroid uterus. VASCULATURE: Unremarkable. BONES: No acute or aggressive osseous abnormalities identified. OTHER: None.    _______________    Impression  1. Multilobar pneumonia    2. No significant findings within the abdomen or pelvis    3.  Fibroid uterus      CT Head 7/14/21  IMPRESSION   No acute intracranial findings        [x]See my orders for details          Marylene Salmon, MD

## 2021-07-15 NOTE — PROGRESS NOTES
MRI safety sheet filled out and placed in physical chart. D/t her intrathecal Baclofen pump and the wire that connects the pump to her spine, the MRI was discontinued. Dr. Akshat Medel made aware.

## 2021-07-15 NOTE — PROGRESS NOTES
conducted an initial consultation and spiritual assessment for Rafal Garvin, who is a 64 y.o.,female. Patient's eyes were open but she was not responsive to me. She did close her eyes when I prayed for her. Family not present at this time. The reason the Patient came to the hospital is:   Patient Active Problem List    Diagnosis Date Noted    Sepsis ([address] 07/14/2021    Bedridden 07/14/2021    Pneumonia 07/14/2021    Cachexia ([address] 07/14/2021    [facility]     Seizure ([facility]     Pressure injury of sacral region, stage 4 ([address]     Acute respiratory failure with hypoxia (HCC)     Elevated troponin     Lactic acidosis         Offered prayer and assurance of continued prayers on patients behalf. Chart reviewed. Chaplains will continue to follow and will provide pastoral care as needed or requested.  recommends bedside caregivers page  on duty if patient shows signs of acute spiritual or emotional distress. [id number] [facility] [address]   Board Certified   [phone] - Office

## 2021-07-15 NOTE — PROGRESS NOTES
Baclofen pump information:    149 mcg/daily  (500 mcg concentration; 6.25 mcg/hour)    Pump is serviced by [facility]  Dr. Taylor Mantle  [phone]    Recently filled and due to be filled again in August.

## 2021-07-15 NOTE — CONSULTS
NEUROLOGY CONSULTATION NOTE    Patient: Mis Ferguson MRN: [id number]  CSN: [id number]    YOB: 1960  Age: 64 y.o. Sex: female    DOA: 7/14/2021 LOS:  LOS: 1 day        Requesting Physician: Dr. Wil Rodriguez  Reason for Consultation: mental status change and possible seizure. HISTORY OF PRESENT ILLNESS:   70-year-old female with past medical history significant for very advanced multiple sclerosis, spasticity, cognitive impairment from multiple sclerosis presented to emergency room yesterday for altered mental status change and seizure-like activity. According to reports, patient has been vomiting since last month. She became less responsive yesterday after vomiting. When her  touch her, she has shaking activity. Patient was found to have hypoxia and fever in emergency room. Patient had aspiration pneumonia, acute respiratory failure with hypoxia, lactic acidosis, hyponatremia, hypercalcemia. Patient follows up with Dr. Dalton Tirado for multiple sclerosis. She takes Keppra 500 mg twice daily. She uses baclofen pump. According to records, in August 2020, patient developed twitching the right side of the face. CT head was negative for acute intracranial abnormality. EEG was negative for any seizure activity. Patient was evaluated by neurology and she was started on Keppra 500 mg b.i.d. No recurrent seizure since then, but some staring off after tetrabenazine was started. Her last visit to neurologist in [facility] 1/2021. Patient was nonverbal. She is seated in a wheelchair. She has moderate head tremor. She has markedly increased tone in all 4 extremities. She has very severe ataxia with all intentional movement. ,    PAST MEDICAL HISTORY:  Past Medical History:   Diagnosis Date    MS (multiple sclerosis) ([facility]      PAST SURGICAL HISTORY:  No past surgical history on file. FAMILY HISTORY:  No family history on file.   SOCIAL HISTORY:  Social History     Socioeconomic History    Marital status:      Spouse name: Not on file    Number of children: Not on file    Years of education: Not on file    Highest education level: Not on file     Social Determinants of Health     Financial Resource Strain:     Difficulty of Paying Living Expenses:    Food Insecurity:     Worried About Running Out of Food in the Last Year:     [address] in the Last Year:    Transportation Needs:     Lack of Transportation (Medical):      Lack of Transportation (Non-Medical):    Physical Activity:     Days of Exercise per Week:     Minutes of Exercise per Session:    Stress:     Feeling of Stress :    Social Connections:     Frequency of Communication with Friends and Family:     Frequency of Social Gatherings with Friends and Family:     Attends [employer] Services:     Active Member of Clubs or Organizations:     Attends Club or Organization Meetings:     Marital Status:      MEDICATIONS:  Current Facility-Administered Medications   Medication Dose Route Frequency    sodium phosphate 9 mmol in 0.9% sodium chloride 250 mL infusion  9 mmol IntraVENous ONCE    potassium chloride 10 mEq in 100 ml IVPB  10 mEq IntraVENous Q1H    insulin lispro (HUMALOG) injection   SubCUTAneous AC&HS    glucose chewable tablet 16 g  4 Tablet Oral PRN    glucagon (GLUCAGEN) injection 1 mg  1 mg IntraMUSCular PRN    dextrose (D50W) injection syrg 12.5-25 g  25-50 mL IntraVENous PRN    morphine injection 1 mg  1 mg IntraVENous Q4H PRN    sodium chloride (NS) flush 5-40 mL  5-40 mL IntraVENous Q8H    sodium chloride (NS) flush 5-40 mL  5-40 mL IntraVENous PRN    acetaminophen (TYLENOL) tablet 650 mg  650 mg Oral Q6H PRN    Or    acetaminophen (TYLENOL) suppository 650 mg  650 mg Rectal Q6H PRN    polyethylene glycol (MIRALAX) packet 17 g  17 g Oral DAILY PRN    ondansetron (ZOFRAN ODT) tablet 4 mg  4 mg Oral Q8H PRN    Or    ondansetron (ZOFRAN) injection 4 mg  4 mg IntraVENous Q6H PRN    enoxaparin (LOVENOX) injection 40 mg  1 mg/kg SubCUTAneous Q12H    0.9% sodium chloride infusion  100 mL/hr IntraVENous CONTINUOUS    ELECTROLYTE REPLACEMENT PROTOCOL - Potassium Standard Dosing   1 Each Other PRN    ELECTROLYTE REPLACEMENT PROTOCOL - Magnesium   1 Each Other PRN    ELECTROLYTE REPLACEMENT PROTOCOL - Phosphorus  Standard Dosing  1 Each Other PRN    ELECTROLYTE REPLACEMENT PROTOCOL - Calcium   1 Each Other PRN    levETIRAcetam (KEPPRA) 1,000 mg in 0.9% sodium chloride 100 mL IVPB  1,000 mg IntraVENous Q12H    cefepime (MAXIPIME) 2 g in sterile water (preservative free) 10 mL IV syringe  2 g IntraVENous Q12H    LORazepam (ATIVAN) injection 1 mg  1 mg IntraVENous Q2H PRN    pantoprazole (PROTONIX) 40 mg in 0.9% sodium chloride 10 mL injection  40 mg IntraVENous DAILY    albuterol-ipratropium (DUO-NEB) 2.5 MG-0.5 MG/3 ML  3 mL Nebulization Q4H PRN    vancomycin (VANCOCIN) 500 mg in 0.9% sodium chloride (MBP/ADV) 100 mL MBP  500 mg IntraVENous Q24H    Vancomycin - Pharmacy to dose   1 Each Other Rx Dosing/Monitoring    metoprolol (LOPRESSOR) injection 2.5 mg  2.5 mg IntraVENous Q6H     Prior to Admission medications    Not on File       ALLERGIES:  Allergies   Allergen Reactions    Pcn [Penicillins] Unknown (comments)       Review of Systems  Unable to obtain. PHYSICAL EXAMINATION:     Visit Vitals  /70 (BP 1 Location: Left arm)   Pulse 98   Temp 98.3 °F (36.8 °C)   Resp 17   Ht 5' 3\" (1.6 m)   Wt 36.3 kg (80 lb)   LMP  (LMP Unknown)   SpO2 100%   BMI 14.17 kg/m²    O2 Flow Rate (L/min): 10 l/min O2 Device: Non-rebreather mask  GENERAL: Pleasant, in no apparent distress. HEENT: Moist mucous membranes, sclerae anicteric, scalp is atraumatic. CVS: Regular rate and rhythm, no murmurs or gallops. No carotid bruits. PULMONARY: Clear to auscultation bilaterally. No rales or rhonchi. No wheezing. EXTREMITIES: Normal range of motion at all sites. No deformities. ABDOMEN: Soft, nontender. SKIN: No rashes or ecchymoses. Warm and dry. NEUROLOGIC: Awake, not follow commands. Patient is nonverbal (chronic)  Cranial nerves: Extraocular movements are intact with no nystagmus. Motor: No spontaneous movement of all 4 extremities. Positive spasticity all 4 extremities. There is spontaneous semirhythmic jerking movement in bilateral lower limbs. Sensory: No response to pain all 4 extremities   coordination: Not follow commands  Deep tendon reflexes: 2+ at biceps, brachioradialis, patella and ankles bilaterally. Toes are up-going bilaterally. Gait assessment: Deferred. Patient is wheelchair-bound at baseline. Labs: Results:       Chemistry Recent Labs     07/15/21  0347 07/15/21  0140 07/14/21  1535   * 170* 182*    136 129*   K 2.9* 2.9* 4.6    102 94*   CO2 26 29 24   BUN 14 15 18   CREA 0.25* 0.30* 0.68   CA 8.3* 8.3* 10.3*   AGAP 10 5 11   BUCR 56* 50* 26*   AP  --   --  115   TP  --   --  9.5*   ALB  --   --  2.7*   GLOB  --   --  6.8*   AGRAT  --   --  0.4*      CBC w/Diff Recent Labs     07/15/21  0347 07/15/21  0140 07/14/21  1535   WBC 20.5* 20.8* 17.1*   RBC 3.30* 3.45* 4.65   HGB 9.3* 9.6* 12.9   HCT 29.6* 30.9* 42.0   * 451* 588*   GRANS 69  --  74*   LYMPH 5*  --  9*   EOS 0  --  0      Cardiac Enzymes Recent Labs     07/15/21  0140 07/14/21  2035   CPK 47 78   CKND1 5.5* 4.0      Coagulation Recent Labs     07/14/21  1535   PTP 16.0*   INR 1.3*       Lipid Panel No results found for: CHOL, CHOLPOCT, CHOLX, CHLST, CHOLV, 771448, HDL, HDLP, LDL, LDLC, DLDLP, 615685, VLDLC, VLDL, TGLX, TRIGL, TRIGP, TGLPOCT, CHHD, CHHDX   BNP No results for input(s): BNPP in the last 72 hours. Liver Enzymes Recent Labs     07/14/21  1535   TP 9.5*   ALB 2.7*         Thyroid Studies No results found for: T4, T3U, TSH, TSHEXT       Radiology:  CT HEAD WO CONT    Result Date: 7/14/2021  EXAM: CT head INDICATION: Altered mental status COMPARISON: None.  TECHNIQUE: Axial CT imaging of the head was performed without intravenous contrast.One or more dose reduction techniques were used on this CT: automated exposure control, adjustment of the mAs and/or kVp according to patient size, and iterative reconstruction techniques. The specific techniques used on this CT exam have been documented in the patient's electronic medical record. Digital Imaging and Communications in Medicine (DICOM) format image data are available to nonaffiliated external healthcare facilities or entities on a secure, media free, reciprocally searchable basis with patient authorization for at least a 12 month period after this study. _______________ FINDINGS: BRAIN AND POSTERIOR FOSSA: The sulci, folia, ventricles and basal cisterns are within normal limits for the patient's age. There is no intracranial hemorrhage, mass effect, or midline shift. There are areas of decreased attenuation within the periventricular and subcortical white matter. EXTRA-AXIAL SPACES AND MENINGES: There are no abnormal extra-axial fluid collections. CALVARIUM: Intact. SINUSES: Clear. OTHER: None. _______________     No acute intracranial findings    CT CHEST ABD PELV W CONT    Result Date: 7/14/2021  EXAM: CT of the chest, abdomen, and pelvis INDICATION: Sepsis pna, nausea and vomiting (requested by admitting team). COMPARISON: None. TECHNIQUE: Axial CT imaging of the chest, abdomen, and pelvis was performed with intravenous contrast. Multiplanar reformats were generated. One or more dose reduction techniques were used on this CT: automated exposure control, adjustment of the mAs and/or kVp according to patient size, and iterative reconstruction techniques. The specific techniques used on this CT exam have been documented in the patient's electronic medical record.  Digital Imaging and Communications in Medicine (DICOM) format image data are available to nonaffiliated external healthcare facilities or entities on a secure, media free, reciprocally searchable basis with patient authorization for at least a 12 month period after this study. _______________ FINDINGS: CHEST: LUNGS: No suspicious nodule or mass. There is multifocal bilateral consolidation. This is more confluent within the right upper and lower lobes. PLEURA: Normal, with no effusion or pneumothorax. AIRWAY: Normal. MEDIASTINUM: Normal heart size. No pericardial effusion. Given the limitations of cardiac motion, great vessels are unremarkable. LYMPH NODES: No enlarged lymph nodes. =============== ABDOMEN/PELVIS: LIVER, BILIARY: Liver attenuation is decreased suggestive of steatosis. No biliary dilation. Gallbladder is unremarkable. PANCREAS: Normal. SPLEEN: Normal. ADRENALS: Normal. KIDNEYS: Normal. LYMPH NODES: No enlarged lymph nodes. GASTROINTESTINAL TRACT: No bowel dilation or wall thickening. PELVIC ORGANS: Fibroid uterus. VASCULATURE: Unremarkable. BONES: No acute or aggressive osseous abnormalities identified. OTHER: None. _______________     1. Multilobar pneumonia 2. No significant findings within the abdomen or pelvis 3. Fibroid uterus    XR CHEST PORT    Result Date: 7/14/2021  EXAM: XR CHEST PORT CLINICAL INDICATION/HISTORY: respiratory failure -Additional: None COMPARISON: None TECHNIQUE: Portable frontal view of the chest _______________ FINDINGS: SUPPORT DEVICES: None. HEART AND MEDIASTINUM: Cardiomediastinal silhouette within normal limits. LUNGS AND PLEURAL SPACES: Bilateral patchy parenchymal/interstitial opacities, right greater left. No large effusion or pneumothorax. _______________     Bilateral patchy parenchymal/interstitial opacities, right greater left. May reflect atypical infection. ASSESSMENT/IMPRESSION:  49-year-old female with past medical history of advanced multiple sclerosis, seizure, cognitive impairment and spasticity presented to emergency room for altered mental status and seizure-like activity.   1. Acute encephalopathy: According to records, patient is nonverbal but follows some commands. Encephalopathy is multifactorial metabolic encephalopathy from pneumonia, post ictal confusion, sepsis and hyponatremia. 2. Seizure: Patient had a history of muscle twitching and \"staring spells\". She takes Keppra 500 mg twice daily. She had limb shaking yesterday. She remains to have spontaneous semirhythmic jerking movement this morning. 3. Pneumonia  4. Hyponatremia  5. Sepsis  RECOMMENDATIONS:  1. Brain MRI and EEG. 2. Increase Keppra 1000 mg twice daily. I will follow the patient.  Please do not hesitate to return with any questions.    ------------------------------------  Olivier Cisse MD  7/15/2021  9:55 AM

## 2021-07-15 NOTE — CONSULTS
[facility]: [phone]  [facility]: [phone]     Patient Name: Christine Olivarez  YOB: 1960    Date of Initial Consult : 7/15/2021  Reason for Consult: Care decisions  Requesting Provider: Dr. Miriam Vogel   Primary Care Physician: Tonya Sheth MD      SUMMARY:   Christine Olivarez is a 64 y.o. female with a past history of MS, dementia, A. fib, decubitus ulcer and bedbound, who was admitted on 7/14/2021 from home with a diagnosis of sepsis. Current medical issues leading to Palliative Medicine involvement include: Thin, frail 71-year-old bedbound female with a history of multiple sclerosis and multiple other comorbid conditions presented to the ER with mental status change and respiratory distress. Palliative medicine is consulted for care decisions. CHIEF COMPLAINT: Mental status change and respiratory distress    HPI/SUBJECTIVE:    Past history as above. Ms. Hailey Jacobo is a very debilitated, thin, frail, bedbound 71-year-old female with multiple sclerosis who presented to the ER with mental status change and respiratory distress. Per daughter and spouse, patient had vomited at home and then developed respiratory difficulty and became less responsive than normal.  Patient was found to be hypoxic and febrile to 104 in the ER. Patient is followed by home palliative through [facility]. The patient is:   [] Verbal and participatory  [x] Non-participatory due to: Nonverbal    GOALS OF CARE: DNR/DNI  Patient/Health Care Proxy Stated Goals: Prolong life      TREATMENT PREFERENCES:   Code Status: DNR/DNI         PALLIATIVE DIAGNOSES:   1. Encounter for palliative care/goals of care  2. Sepsis  3. Seizure  4. Debility       PLAN:   1. Encounter for palliative care/goals of care - een at bedside along with Ms. Dinorah Marinelli. Ms. Hailey Jacobo is lying in bed with her eyes open and nonrebreather mask in place.   Patient seems to blink in response to questions and verbal stimuli, nonverbal.  Appears comfortable, no facial grimacing observed. Follow-up conversation today at [phone] with daughter, Sharri Johnston in ICU waiting room. She shared that family is not yet ready to make any decision with regard to comfort measures however they are considering the option of home hospice. Hospice consult placed on 7/14/2021. Family awaiting contact. Yelena shared that she will be talking with her father (patient's ) this evening about the option of home hospice for patient. Supportive listening provided as Waqas Suarez shared information about her mother. Palliative medicine contact information given to Waqas Suarez who expressed appreciation for the follow-up discussion and information. Goals of care are DNR/DNI. Palliative medicine will continue to follow and support family as they make healthcare decisions for patient. 2. Sepsis -primary team managing, IV antibiotics    3. Seizure -primary team managing, neuro consult, head CT and EEG pending, IV Keppra and as needed Ativan    4. Debility - PPS 30 which indicates an overall poor prognosis, patient lives at home with her  and has a caregiver that comes in to assist daily. Patient is bedbound and wheelchair-bound at home. Followed by home palliative with [facility]. 5. Initial consult note routed to primary continuity provider    6. Communicated plan of care with: Palliative IDT, daughter      Advance Care Planning:  [] The [facility] Interdisciplinary Team has updated the ACP Navigator with [address] and Patient Capacity    Primary Decision [facility] ([address]):   Primary Decision Maker: Jayda Mariscal - Spouse - [phone]    Secondary Decision Maker: Adrian Mendez - Daughter - [phone]              As far as possible, the palliative care team has discussed with patient / health care proxy about goals of care / treatment preferences for patient.          HISTORY: History obtained from: Chart and daughterYelena Das    Principal Problem:    Acute respiratory failure with hypoxia (HCC) ()    Active Problems:    Sepsis ([facility] [address] (7/14/2021)      MS (multiple sclerosis) (HCC) ()      Seizure (HCC) ()      Pressure injury of sacral region, stage 4 (HCC) ()      Elevated troponin ()      Lactic acidosis ()      Bedridden (7/14/2021)      Pneumonia (7/14/2021)      Cachexia ([facility] [address] (7/14/2021)      Severe protein-calorie malnutrition ([facility] [address] (7/15/2021)      Hypokalemia (7/15/2021)      Past Medical History:   Diagnosis Date    MS (multiple sclerosis) ([facility] [address]       No past surgical history on file. No family history on file. History reviewed, no pertinent family history.   Social History     Tobacco Use    Smoking status: Not on file   Substance Use Topics    Alcohol use: Not on file     Allergies   Allergen Reactions    Pcn [Penicillins] Unknown (comments)      Current Facility-Administered Medications   Medication Dose Route Frequency    glucose chewable tablet 16 g  4 Tablet Oral PRN    glucagon (GLUCAGEN) injection 1 mg  1 mg IntraMUSCular PRN    dextrose (D50W) injection syrg 12.5-25 g  25-50 mL IntraVENous PRN    morphine injection 1 mg  1 mg IntraVENous Q4H PRN    insulin lispro (HUMALOG) injection   SubCUTAneous Q6H    dextrose 5% infusion  50 mL/hr IntraVENous CONTINUOUS    metoprolol (LOPRESSOR) injection 5 mg  5 mg IntraVENous Q6H    sodium chloride (NS) flush 5-40 mL  5-40 mL IntraVENous Q8H    sodium chloride (NS) flush 5-40 mL  5-40 mL IntraVENous PRN    acetaminophen (TYLENOL) tablet 650 mg  650 mg Oral Q6H PRN    Or    acetaminophen (TYLENOL) suppository 650 mg  650 mg Rectal Q6H PRN    polyethylene glycol (MIRALAX) packet 17 g  17 g Oral DAILY PRN    ondansetron (ZOFRAN ODT) tablet 4 mg  4 mg Oral Q8H PRN    Or    ondansetron (ZOFRAN) injection 4 mg  4 mg IntraVENous Q6H PRN    enoxaparin (LOVENOX) injection 40 mg  1 mg/kg SubCUTAneous Q12H    ELECTROLYTE REPLACEMENT PROTOCOL - Potassium Standard Dosing   1 Each Other PRN    ELECTROLYTE REPLACEMENT PROTOCOL - Magnesium   1 Each Other PRN    ELECTROLYTE REPLACEMENT PROTOCOL - Phosphorus  Standard Dosing  1 Each Other PRN    ELECTROLYTE REPLACEMENT PROTOCOL - Calcium   1 Each Other PRN    levETIRAcetam (KEPPRA) 1,000 mg in 0.9% sodium chloride 100 mL IVPB  1,000 mg IntraVENous Q12H    cefepime (MAXIPIME) 2 g in sterile water (preservative free) 10 mL IV syringe  2 g IntraVENous Q12H    LORazepam (ATIVAN) injection 1 mg  1 mg IntraVENous Q2H PRN    pantoprazole (PROTONIX) 40 mg in 0.9% sodium chloride 10 mL injection  40 mg IntraVENous DAILY    albuterol-ipratropium (DUO-NEB) 2.5 MG-0.5 MG/3 ML  3 mL Nebulization Q4H PRN    vancomycin (VANCOCIN) 500 mg in 0.9% sodium chloride (MBP/ADV) 100 mL MBP  500 mg IntraVENous Q24H    Vancomycin - Pharmacy to dose   1 Each Other Rx Dosing/Monitoring          Clinical Pain Assessment (nonverbal scale for nonverbal patients): Activity (Movement): Lying quietly, normal position    Duration: for how long has pt been experiencing pain (e.g., 2 days, 1 month, years)  Frequency: how often pain is an issue (e.g., several times per day, once every few days, constant)     FUNCTIONAL ASSESSMENT:     Palliative Performance Scale (PPS):  PPS: 30    ECOG  ECOG Status : Completely disabled     PSYCHOSOCIAL/SPIRITUAL SCREENING:      Any spiritual / Taoism concerns: 7/15/2021unable to assess  [] Yes /  [] No    Caregiver Burnout:  [] Yes /  [x] No /  [] No Caregiver Present      Anticipatory grief assessment: 7/15/2021unable to assess  [] Normal  / [] Maladaptive        REVIEW OF SYSTEMS:     Systems: constitutional, ears/nose/mouth/throat, respiratory, gastrointestinal, genitourinary, musculoskeletal, integumentary, neurologic, psychiatric, endocrine. Positive findings noted below.   Modified ESAS Completed by: provider                       Dyspnea: 0 Positive and pertinent negative findings in ROS are noted above in HPI. The following systems were [] reviewed / [x] unable to be reviewed as noted in HPI  Other findings are noted below. PHYSICAL EXAM:     Constitutional: Thin, frail, chronically ill-appearing, lying in bed with nonrebreather mask in place, nonverbal, appears to blink eyes in response to verbal stimuli, appears comfortable  Eyes: pupils equal, anicteric  ENMT: no nasal discharge, moist mucous membranes  Cardiovascular: Tachycardic rhythm, distal pulses intact  Respiratory: breathing not labored, symmetric, nonrebreather mask in place  Gastrointestinal: Abdomen flat, noted implanted medication pump right abdomen  Last bowel movement: None recorded  Musculoskeletal: no deformity, no tenderness to palpation  Skin: warm, dry  Neurologic: Awake and alert, nonverbal, does not move extremities  Psychiatric: Unable to assess, nonverbal    Other: Wt Readings from Last 3 Encounters:   07/15/21 36.3 kg (80 lb)     Blood pressure 125/70, pulse 98, temperature 99.8 °F (37.7 °C), resp. rate 17, height 5' 3\" (1.6 m), weight 36.3 kg (80 lb), SpO2 100 %. Pain:  Pain Scale 1: FLACC                         LAB AND IMAGING FINDINGS:     Lab Results   Component Value Date/Time    WBC 20.5 (H) 07/15/2021 03:47 AM    HGB 9.3 (L) 07/15/2021 03:47 AM    PLATELET 934 (H) 45/79/4077 03:47 AM     Lab Results   Component Value Date/Time    Sodium 140 07/15/2021 02:12 PM    Potassium 3.3 (L) 07/15/2021 02:12 PM    Chloride 106 07/15/2021 02:12 PM    CO2 28 07/15/2021 02:12 PM    BUN 12 07/15/2021 02:12 PM    Creatinine 0.28 (L) 07/15/2021 02:12 PM    Calcium 8.9 07/15/2021 02:12 PM    Magnesium 1.9 07/15/2021 01:40 AM    Phosphorus 1.7 (L) 07/15/2021 01:40 AM      Lab Results   Component Value Date/Time    Alk.  phosphatase 115 07/14/2021 03:35 PM    Protein, total 9.5 (H) 07/14/2021 03:35 PM    Albumin 2.7 (L) 07/14/2021 03:35 PM    Globulin 6.8 (H) 07/14/2021 03:35 PM Lab Results   Component Value Date/Time    INR 1.3 (H) 07/14/2021 03:35 PM    Prothrombin time 16.0 (H) 07/14/2021 03:35 PM      No results found for: IRON, FE, TIBC, IBCT, PSAT, FERR   No results found for: PH, PCO2, PO2  No components found for: [place]   Lab Results   Component Value Date/Time    CK 37 07/15/2021 09:30 AM    CK - MB 2.7 07/15/2021 09:30 AM              Total time: 50 minutes     > 50% counseling / coordination:  Time spent in direct consultation with the patient, medical team, and family     Prolonged service was provided for  []30 min   []75 min in face to face time in the presence of the patient, spent as noted above. Time Start:   Time End:     Disclaimer: Sections of this note are dictated using utilizing voice recognition software, which may have resulted in some phonetic based errors in grammar and contents. Even though attempts were made to correct all the mistakes, some may have been missed, and remained in the body of the document. If questions arise, please contact our department.

## 2021-07-15 NOTE — WOUND CARE
IP WOUND CONSULT    Cleve Garcia RECORD NUMBER:  [id number]  AGE: 64 y.o. GENDER: female  : 1960  TODAY'S DATE:  7/15/2021    GENERAL     [] Follow-up   [x] New Consult    Mohini Valente is a 64 y.o. female referred by:    [x] Physician  [x] Nursing  [] Other:         PAST MEDICAL HISTORY    Past Medical History:   Diagnosis Date    MS (multiple sclerosis) ([facility] [address]         PAST SURGICAL HISTORY    No past surgical history on file. FAMILY HISTORY    No family history on file. SOCIAL HISTORY    Social History     Tobacco Use    Smoking status: Not on file   Substance Use Topics    Alcohol use: Not on file    Drug use: Not on file       ALLERGIES    Allergies   Allergen Reactions    Pcn [Penicillins] Unknown (comments)       MEDICATIONS    No current facility-administered medications on file prior to encounter. Current Outpatient Medications on File Prior to Encounter   Medication Sig Dispense Refill    baclofen (LIORESAL) 10 mg tablet Take  by mouth three (3) times daily.  metoprolol succinate (TOPROL-XL) 25 mg XL tablet Take 25 mg by mouth daily.  folic acid (FOLVITE) 1 mg tablet Take 1 mg by mouth daily.  predniSONE (DELTASONE) 10 mg tablet Take 5 mg by mouth daily.  cholecalciferol, vitamin D3, (Vitamin D3) 50 mcg (2,000 unit) tab Take 2,000 Units by mouth daily.  clonazePAM (KlonoPIN) 0.5 mg tablet Take 0.25 mg by mouth two (2) times a day.  baclofen (LIORESAL) 20 mg tablet Take 20 mg by mouth two (2) times a day.  levETIRAcetam (Keppra) 500 mg tablet Take 500 mg by mouth two (2) times a day.  2 tabs in the am; 1 tab at night         Wt Readings from Last 3 Encounters:   07/15/21 36.3 kg (80 lb)       [unfilled]  Visit Vitals  /70 (BP 1 Location: Left arm)   Pulse 98   Temp 99.8 °F (37.7 °C)   Resp 17   Ht 5' 3\" (1.6 m)   Wt 36.3 kg (80 lb)   LMP  (LMP Unknown)   SpO2 100%   BMI 14.17 kg/m²       ASSESSMENT     Skin impairment Identification:  Type: pressure    Contributing Factors: chronic pressure and decreased mobility    Wound Hip left hip wound is open wound vac removed to assess wound area is red and open with some yellow necrotic tissue size of 6cm x6mc about the size of a base ball it is round  07/14/21 (Active)   Wound Image   07/15/21 1535   Wound Etiology Pressure Stage 4 07/15/21 1535   Dressing Status Clean;Dry; Intact 07/15/21 1535   Cleansed Wound cleanser;Irrigated with saline 07/15/21 1535   Dressing/Treatment Silicone border 28/58/34 1535   Wound Length (cm) 8.5 cm 07/15/21 1535   Wound Width (cm) 7 cm 07/15/21 1535   Wound Depth (cm) 0.3 cm 07/15/21 1535   Wound Surface Area (cm^2) 59.5 cm^2 07/15/21 1535   Wound Volume (cm^3) 17.85 cm^3 07/15/21 1535   Undermining Starts ___ O'Clock 6 o'clock 07/15/21 1535   Undermining Ends ___ O'Clock 11 o'clock 07/15/21 1535   Undermining Maximum Distance (cm) 5.5 cm 07/15/21 1535   Wound Assessment Exposed Structure Bone;Pink/red 07/15/21 1535   Drainage Amount Moderate 07/15/21 1535   Drainage Description Serous 07/15/21 1535   Wound Odor None 07/15/21 1535   Kathrine-Wound/Incision Assessment Intact;Fragile 07/15/21 1535   Edges Attached edges; Defined edges 07/15/21 1535   Wound Thickness Description Full thickness 07/15/21 1535   Number of days: 1       Wound Sacrum open wound 5cm x 5cm to wound vac that was removed 07/14/21 (Active)   Wound Image   07/15/21 1535   Wound Etiology Pressure Stage 4 07/15/21 1535   Dressing Status Clean;Dry; Intact;Breakthrough drainage noted 07/15/21 1535   Cleansed Wound cleanser;Irrigated with saline 07/15/21 1535   Dressing/Treatment Silicone border 88/64/22 1535   Wound Length (cm) 6 cm 07/15/21 1535   Wound Width (cm) 6 cm 07/15/21 1535   Wound Depth (cm) 0.3 cm 07/15/21 1535   Wound Surface Area (cm^2) 36 cm^2 07/15/21 1535   Wound Volume (cm^3) 10.8 cm^3 07/15/21 1535   Wound Assessment Exposed Structure Bone;Devitalized tissue 07/15/21 1535 Drainage Amount Moderate 07/15/21 1535   Drainage Description Serous 07/15/21 1535   Wound Odor None 07/15/21 1535   Kathrine-Wound/Incision Assessment Intact 07/15/21 1535   Edges Attached edges; Defined edges 07/15/21 1535   Wound Thickness Description Full thickness 07/15/21 1535   Number of days: 1       Wound Leg Right; Anterior right side of leg has wounds x2 varying degrees unalbe to stage yellow and white and black tissue  07/14/21 (Active)   Wound Image    07/15/21 1535   Wound Etiology Pressure Stage 2 07/15/21 1535   Dressing Status Clean;Dry; Intact 07/15/21 1535   Cleansed Wound cleanser;Irrigated with saline 07/15/21 1535   Dressing/Treatment Silicone border 39/29/26 1535   Wound Length (cm) 2.5 cm 07/15/21 1535   Wound Width (cm) 1.4 cm 07/15/21 1535   Wound Depth (cm) 0.1 cm 07/15/21 1535   Wound Surface Area (cm^2) 3.5 cm^2 07/15/21 1535   Wound Volume (cm^3) 0.35 cm^3 07/15/21 1535   Undermining Starts ___ O'Clock 12 o'clock 07/15/21 1535   Undermining Ends ___ O'Clock 12 o'clock 07/15/21 1535   Undermining Maximum Distance (cm) 0.5 cm 07/15/21 1535   Wound Assessment Epithelialization;Pink/red 07/15/21 1535   Drainage Amount Small 07/15/21 1535   Drainage Description Serous 07/15/21 1535   Wound Odor None 07/15/21 1535   Kathrine-Wound/Incision Assessment Intact 07/15/21 1535   Edges Attached edges; Defined edges 07/15/21 1535   Wound Thickness Description Full thickness 07/15/21 1535   Number of days: 1       Wound Ankle Left irregular shaped wound to left ankle tissue is white 07/14/21 (Active)   Wound Image   07/15/21 1535   Wound Etiology Pressure Stage 3 07/15/21 1535   Dressing Status Clean;Dry; Intact 07/15/21 1535   Cleansed Wound cleanser;Irrigated with saline 07/15/21 1535   Dressing/Treatment Silicone border 09/03/87 1535   Wound Length (cm) 1.3 cm 07/15/21 1535   Wound Width (cm) 1 cm 07/15/21 1535   Wound Depth (cm) 0.2 cm 07/15/21 1535   Wound Surface Area (cm^2) 1.3 cm^2 07/15/21 1535   Wound Volume (cm^3) 0.26 cm^3 07/15/21 1535   Wound Assessment Pink/red 07/15/21 1535   Drainage Amount Small 07/15/21 1535   Drainage Description Serous 07/15/21 1535   Wound Odor None 07/15/21 1535   Kathrine-Wound/Incision Assessment Intact 07/15/21 1535   Edges Defined edges 07/15/21 1535   Wound Thickness Description Full thickness 07/15/21 1535   Number of days: 1          PLAN     Skin Care & Pressure Relief Recommendations  Minimize layers of linen  Pads under patient to optimize support surface  Turn/reposition approximately every 2 hours  Manage incontinence     Physician/Provider notified: Yes  Recommendations: keep borders changed to keep wounds clean and dry    Teaching completed with:   [] Patient           [x] Family member       [] Caregiver          [x] Nursing  [] Other    Patient/Caregiver Teaching:  Level of patient/caregiver understanding able to:   [x] Indicates understanding       [] Needs reinforcement  [] Unsuccessful      [] Verbal Understanding  [] Demonstrated understanding       [] No evidence of learning  [] Refused teaching         [] N/A       Electronically signed by Ricardo Dalton RN on 7/15/2021 at 3:54 PM

## 2021-07-15 NOTE — PROGRESS NOTES
Hospitalist Progress Note-critical care note     Patient: Gely Kam MRN: [id number]  CSN: [id number]    YOB: 1960  Age: 64 y.o. Sex: female    DOA: 7/14/2021 LOS:  LOS: 1 day            Chief complaint: Acute respiratory failure sepsis seizure elevated troponin fell riding pneumonia.   MS, hypokalemia    Assessment/Plan         Hospital Problems  Never Reviewed        Codes Class Noted POA    Severe protein-calorie malnutrition ([facility] ICD-10-CM: E43  ICD-9-CM: 262  7/15/2021 Unknown        Hypokalemia ICD-10-CM: E87.6  ICD-9-CM: 276.8  7/15/2021 Unknown        Sepsis ([facility] ICD-10-CM: A41.9  ICD-9-CM: 038.9, 995.91  7/14/2021         MS (multiple sclerosis) ([facility].) ICD-10-CM: G35  ICD-9-CM: 340  Unknown Unknown        Seizure ([facility] ICD-10-CM: R56.9  ICD-9-CM: 780.39  Unknown Unknown        Pressure injury of sacral region, stage 4 (HCC) ICD-10-CM: L89.154  ICD-9-CM: 707.03, 707.24  Unknown Unknown        * (Principal) Acute respiratory failure with hypoxia ([facility] ICD-10-CM: J96.01  ICD-9-CM: 518.81  Unknown Unknown        Elevated troponin ICD-10-CM: R77.8  ICD-9-CM: 790.6  Unknown Unknown        Lactic acidosis ICD-10-CM: E87.2  ICD-9-CM: 276.2  Unknown Unknown        Bedridden ICD-10-CM: Z74.01  ICD-9-CM: V49.84  7/14/2021 Unknown        Pneumonia ICD-10-CM: J18.9  ICD-9-CM: 785  7/14/2021 Unknown        Cachexia ([facility] ICD-10-CM: R64  ICD-9-CM: 799.4  7/14/2021 Unknown               Resp -  acute respiratory failure with hypoxia  Still on non re- breathing mask   Due to aspiration pna   pulm follow   Rapid covid 19 negative      Pna : due to aspiration   Continue cefepime and vanc   Breathing tx as needed            ID -   Vanco and cefepime, follow-up culture  So far cx negative      Aspiration pneumonia, decubitus ulcer  Continue IV antibiotics, follow-up with culture results     CVS -   Elevated troponin:  ce trending down, echo pending   Dr. Angela Ambrosio f/u      Tachycardia: resolved   Low dose metoprolol      Heme/onc -   Leukocytosis: wbc still elevated , afebrile      Renal -   Lactic acidosis: resolved   Hyponatremia :resolved   Hyperkalemia : K replacement      Endocrine -  Follow FSG  Check tsh      Neuro   seizures: Received Ativan, Keppra in ER  Dr. Marjan Blanco f/u and planning EEG   CT head no acute process     MS: Bedridden almost 1 year. Baseline: Sometimes can recognize family member     GI - NPO for now. ppi      Derm: Decubitus ulcer, lesion on ankle  Wound care.     Cachexia :     Prognosis is poor -palliative care consulted   30 minutes of critical care time spent in the direct evaluation and treatment of this high risk patient. The reason for providing this level of medical care for this critically ill patient was due a critical illness that impaired one or more vital organ systems such that there was a high probability of imminent or life threatening deterioration in the patients condition. This care involved high complexity decision making to assess, manipulate, and support vital system functions, to treat this degreee vital organ system failure and to prevent further life threatening deterioration of the patients condition. Rn: stable,   Disposition :tbd,   Review of systems:  Unable to obtain due to pt condition   Vital signs/Intake and Output:  Visit Vitals  /70 (BP 1 Location: Left arm)   Pulse 98   Temp 98.3 °F (36.8 °C)   Resp 17   Ht 5' 3\" (1.6 m)   Wt 36.3 kg (80 lb)   SpO2 100%   BMI 14.17 kg/m²     Current Shift:  07/15 0701 - 07/15 1900  In: 1291.2 [I.V.:1291.2]  Out: 50 [Urine:50]  Last three shifts:  07/13 1901 - 07/15 0700  In: -   Out: 150 [Urine:150]    Physical Exam:  General: Alert, not cooperative,   HEENT: NC, Atraumatic. PERRLA, anicteric sclerae. Non-rebreathing mask noted   Lungs: CTA Bilaterally. No Wheezing/Rhonchi/Rales. Heart:  Regular  rhythm,  No murmur, No Rubs, No Gallops  Abdomen: Soft, Non distended, Non tender.   +Bowel sounds,   Extremities: No c/c/e  Psych:   Calm   Neurologic:  Not follow command, eyes blinks            Labs: Results:       Chemistry Recent Labs     07/15/21  0347 07/15/21  0140 07/14/21  1535   * 170* 182*    136 129*   K 2.9* 2.9* 4.6    102 94*   CO2 26 29 24   BUN 14 15 18   CREA 0.25* 0.30* 0.68   CA 8.3* 8.3* 10.3*   AGAP 10 5 11   BUCR 56* 50* 26*   AP  --   --  115   TP  --   --  9.5*   ALB  --   --  2.7*   GLOB  --   --  6.8*   AGRAT  --   --  0.4*      CBC w/Diff Recent Labs     07/15/21  0347 07/15/21  0140 07/14/21  1535   WBC 20.5* 20.8* 17.1*   RBC 3.30* 3.45* 4.65   HGB 9.3* 9.6* 12.9   HCT 29.6* 30.9* 42.0   * 451* 588*   GRANS 69  --  74*   LYMPH 5*  --  9*   EOS 0  --  0      Cardiac Enzymes Recent Labs     07/15/21  0930 07/15/21  0140   CPK 37 47   CKND1 7.3* 5.5*      Coagulation Recent Labs     07/14/21  1535   PTP 16.0*   INR 1.3*       Lipid Panel No results found for: CHOL, CHOLPOCT, CHOLX, CHLST, CHOLV, 557898, HDL, HDLP, LDL, LDLC, DLDLP, 357911, VLDLC, VLDL, TGLX, TRIGL, TRIGP, TGLPOCT, CHHD, CHHDX   BNP No results for input(s): BNPP in the last 72 hours. Liver Enzymes Recent Labs     07/14/21  1535   TP 9.5*   ALB 2.7*         Thyroid Studies No results found for: T4, T3U, TSH, TSHEXT, TSHEXT     Procedures/imaging: see electronic medical records for all procedures/Xrays and details which were not copied into this note but were reviewed prior to creation of Plan    CT HEAD WO CONT    Result Date: 7/14/2021  EXAM: CT head INDICATION: Altered mental status COMPARISON: None. TECHNIQUE: Axial CT imaging of the head was performed without intravenous contrast.One or more dose reduction techniques were used on this CT: automated exposure control, adjustment of the mAs and/or kVp according to patient size, and iterative reconstruction techniques. The specific techniques used on this CT exam have been documented in the patient's electronic medical record.  Digital Imaging and Communications in Medicine (DICOM) format image data are available to nonaffiliated external healthcare facilities or entities on a secure, media free, reciprocally searchable basis with patient authorization for at least a 12 month period after this study. _______________ FINDINGS: BRAIN AND POSTERIOR FOSSA: The sulci, folia, ventricles and basal cisterns are within normal limits for the patient's age. There is no intracranial hemorrhage, mass effect, or midline shift. There are areas of decreased attenuation within the periventricular and subcortical white matter. EXTRA-AXIAL SPACES AND MENINGES: There are no abnormal extra-axial fluid collections. CALVARIUM: Intact. SINUSES: Clear. OTHER: None. _______________     No acute intracranial findings    CT CHEST ABD PELV W CONT    Result Date: 7/14/2021  EXAM: CT of the chest, abdomen, and pelvis INDICATION: Sepsis pna, nausea and vomiting (requested by admitting team). COMPARISON: None. TECHNIQUE: Axial CT imaging of the chest, abdomen, and pelvis was performed with intravenous contrast. Multiplanar reformats were generated. One or more dose reduction techniques were used on this CT: automated exposure control, adjustment of the mAs and/or kVp according to patient size, and iterative reconstruction techniques. The specific techniques used on this CT exam have been documented in the patient's electronic medical record. Digital Imaging and Communications in Medicine (DICOM) format image data are available to nonaffiliated external healthcare facilities or entities on a secure, media free, reciprocally searchable basis with patient authorization for at least a 12 month period after this study. _______________ FINDINGS: CHEST: LUNGS: No suspicious nodule or mass. There is multifocal bilateral consolidation. This is more confluent within the right upper and lower lobes. PLEURA: Normal, with no effusion or pneumothorax. AIRWAY: Normal. MEDIASTINUM: Normal heart size.  No pericardial effusion. Given the limitations of cardiac motion, great vessels are unremarkable. LYMPH NODES: No enlarged lymph nodes. =============== ABDOMEN/PELVIS: LIVER, BILIARY: Liver attenuation is decreased suggestive of steatosis. No biliary dilation. Gallbladder is unremarkable. PANCREAS: Normal. SPLEEN: Normal. ADRENALS: Normal. KIDNEYS: Normal. LYMPH NODES: No enlarged lymph nodes. GASTROINTESTINAL TRACT: No bowel dilation or wall thickening. PELVIC ORGANS: Fibroid uterus. VASCULATURE: Unremarkable. BONES: No acute or aggressive osseous abnormalities identified. OTHER: None. _______________     1. Multilobar pneumonia 2. No significant findings within the abdomen or pelvis 3. Fibroid uterus    XR CHEST PORT    Result Date: 7/14/2021  EXAM: XR CHEST PORT CLINICAL INDICATION/HISTORY: respiratory failure -Additional: None COMPARISON: None TECHNIQUE: Portable frontal view of the chest _______________ FINDINGS: SUPPORT DEVICES: None. HEART AND MEDIASTINUM: Cardiomediastinal silhouette within normal limits. LUNGS AND PLEURAL SPACES: Bilateral patchy parenchymal/interstitial opacities, right greater left. No large effusion or pneumothorax. _______________     Bilateral patchy parenchymal/interstitial opacities, right greater left. May reflect atypical infection.       Juan Muhammad MD

## 2021-07-16 NOTE — PROGRESS NOTES
Hospitalist Progress Note-critical care note     Patient: Izabela Padgett MRN: [id number]  [facility]: [id number]    YOB: 1960  Age: 64 y.o. Sex: female    DOA: 7/14/2021 LOS:  LOS: 2 days            Chief complaint: Acute respiratory failure sepsis seizure elevated troponin fell riding pneumonia.   MS, hypokalemia    Assessment/Plan         Hospital Problems  Never Reviewed        Codes Class Noted POA    Severe protein-calorie malnutrition ([facility] ICD-10-CM: E43  ICD-9-CM: 262  7/15/2021 Unknown        Hypokalemia ICD-10-CM: E87.6  ICD-9-CM: 276.8  7/15/2021 Unknown        Encounter for palliative care ICD-10-CM: Z51.5  ICD-9-CM: V66.7  Unknown Unknown        Debility ICD-10-CM: R53.81  ICD-9-CM: 799.3  Unknown Unknown        Sepsis ([facility] ICD-10-CM: A41.9  ICD-9-CM: 038.9, 995.91  7/14/2021         MS (multiple sclerosis) ([facility] ICD-10-CM: G35  ICD-9-CM: 340  Unknown Unknown        Seizure ([facility] ICD-10-CM: R56.9  ICD-9-CM: 780.39  Unknown Unknown        Pressure injury of sacral region, stage 4 (HCC) ICD-10-CM: L89.154  ICD-9-CM: 707.03, 707.24  Unknown Unknown        * (Principal) Acute respiratory failure with hypoxia ([facility] ICD-10-CM: J96.01  ICD-9-CM: 518.81  Unknown Unknown        Elevated troponin ICD-10-CM: R77.8  ICD-9-CM: 790.6  Unknown Unknown        Lactic acidosis ICD-10-CM: E87.2  ICD-9-CM: 276.2  Unknown Unknown        Bedridden ICD-10-CM: Z74.01  ICD-9-CM: V49.84  7/14/2021 Unknown        Pneumonia ICD-10-CM: J18.9  ICD-9-CM: 935  7/14/2021 Unknown        Cachexia ([facility] ICD-10-CM: R64  ICD-9-CM: 799.4  7/14/2021 Unknown               Resp -  acute respiratory failure with hypoxia  On high flow O2   Due to aspiration pna   pulm follow   Rapid covid 19 negative      Pna : due to aspiration   Not improving   Continue cefepime and vanc   Breathing tx as needed            ID -   Vanco and cefepime, follow-up culture  So far cx negative      Aspiration pneumonia, decubitus ulcer  Continue IV antibiotics,      CVS -   Elevated troponin:  ce trending down, echo : normal ef   Dr. Morales Drafts on board   No acs      Tachycardia: resolved   Will give one dose metoprolol   htn : add clonidine , hydralyzine prn , metoprolol      Heme/onc -   Leukocytosis: wbc still elevated , afebrile   bcx so far negative      Renal -   Lactic acidosis: resolved   Hyponatremia :resolved   Hyperkalemia : K replacement      Endocrine -  Follow FSG  Check tsh      Neuro   seizures: Received Tien Valerio was on , eeg negative   CT head no acute process  Not a candidate for mri due to  Pump  Continue keppra      MS: Bedridden almost 1 year. On baclofen pump   Baseline: Sometimes can recognize family member     GI - NPO for now. ppi    oral care   Derm: Decubitus ulcer, lesion on ankle  Wound care.     Cachexia :     Prognosis is poor   Palliative care f/u-hospice and comfort care will be benefit for the patient   30 minutes of critical care time spent in the direct evaluation and treatment of this high risk patient. The reason for providing this level of medical care for this critically ill patient was due a critical illness that impaired one or more vital organ systems such that there was a high probability of imminent or life threatening deterioration in the patients condition. This care involved high complexity decision making to assess, manipulate, and support vital system functions, to treat this degreee vital organ system failure and to prevent further life threatening deterioration of the patients condition. rn daughter is nterested in hospice , bp, hr elevated   Disposition :tbd,   Review of systems:  Unable to obtain due to pt condition   Vital signs/Intake and Output:  Visit Vitals  BP (!) 164/79   Pulse (!) 138   Temp 96.9 °F (36.1 °C)   Resp 25   Ht 5' 3\" (1.6 m)   Wt 33.6 kg (74 lb 1.2 oz)   SpO2 96%   BMI 13.12 kg/m²     Current Shift:  No intake/output data recorded.   Last three shifts:  07/14 1901 - 07/16 0700  In: 3050.2 [I.V.:3050.2]  Out: 2500 [Urine:2500]    Physical Exam:  General: Alert, not cooperative,   HEENT: NC, Atraumatic. PERRLA, anicteric sclerae. High flow O2 noted    Lungs: CTA Bilaterally. No Wheezing/Rhonchi/Rales. Heart:  Regular  rhythm,  No murmur, No Rubs, No Gallops  Abdomen: Soft, Non distended, Non tender. +Bowel sounds,   Extremities: No c/c/e  Psych:   Calm   Neurologic:  Not follow command, eyes blinks            Labs: Results:       Chemistry Recent Labs     07/16/21  0450 07/15/21  1412 07/15/21  0347 07/15/21  0140 07/14/21  1535   * 101* 157*   < > 182*   * 140 137   < > 129*   K 3.5 3.3* 2.9*   < > 4.6   CL 99* 106 101   < > 94*   CO2 24 28 26   < > 24   BUN 7 12 14   < > 18   CREA 0.24* 0.28* 0.25*   < > 0.68   CA 8.2* 8.9 8.3*   < > 10.3*   AGAP 10 6 10   < > 11   BUCR 29* 43* 56*   < > 26*   AP  --   --   --   --  115   TP  --   --   --   --  9.5*   ALB  --   --   --   --  2.7*   GLOB  --   --   --   --  6.8*   AGRAT  --   --   --   --  0.4*    < > = values in this interval not displayed. CBC w/Diff Recent Labs     07/16/21  0450 07/15/21  0347 07/15/21  0140 07/14/21  1535 07/14/21  1535   WBC 22.2* 20.5* 20.8*   < > 17.1*   RBC 3.31* 3.30* 3.45*   < > 4.65   HGB 9.4* 9.3* 9.6*   < > 12.9   HCT 29.4* 29.6* 30.9*   < > 42.0    421* 451*   < > 588*   GRANS 86* 69  --   --  74*   LYMPH 2* 5*  --   --  9*   EOS 0 0  --   --  0    < > = values in this interval not displayed. Cardiac Enzymes Recent Labs     07/15/21  0930 07/15/21  0140   CPK 37 47   CKND1 7.3* 5.5*      Coagulation Recent Labs     07/14/21  1535   PTP 16.0*   INR 1.3*       Lipid Panel No results found for: CHOL, CHOLPOCT, CHOLX, CHLST, CHOLV, 499394, HDL, HDLP, LDL, LDLC, DLDLP, 958190, VLDLC, VLDL, TGLX, TRIGL, TRIGP, TGLPOCT, CHHD, CHHDX   BNP No results for input(s): BNPP in the last 72 hours.    Liver Enzymes Recent Labs     07/14/21  1535   TP 9.5*   ALB 2.7*    Thyroid Studies No results found for: T4, T3U, TSH, TSHEXT, TSHEXT     Procedures/imaging: see electronic medical records for all procedures/Xrays and details which were not copied into this note but were reviewed prior to creation of Plan    CT HEAD WO CONT    Result Date: 7/14/2021  EXAM: CT head INDICATION: Altered mental status COMPARISON: None. TECHNIQUE: Axial CT imaging of the head was performed without intravenous contrast.One or more dose reduction techniques were used on this CT: automated exposure control, adjustment of the mAs and/or kVp according to patient size, and iterative reconstruction techniques. The specific techniques used on this CT exam have been documented in the patient's electronic medical record. Digital Imaging and Communications in Medicine (DICOM) format image data are available to nonaffiliated external healthcare facilities or entities on a secure, media free, reciprocally searchable basis with patient authorization for at least a 12 month period after this study. _______________ FINDINGS: BRAIN AND POSTERIOR FOSSA: The sulci, folia, ventricles and basal cisterns are within normal limits for the patient's age. There is no intracranial hemorrhage, mass effect, or midline shift. There are areas of decreased attenuation within the periventricular and subcortical white matter. EXTRA-AXIAL SPACES AND MENINGES: There are no abnormal extra-axial fluid collections. CALVARIUM: Intact. SINUSES: Clear. OTHER: None. _______________     No acute intracranial findings    CT CHEST ABD PELV W CONT    Result Date: 7/14/2021  EXAM: CT of the chest, abdomen, and pelvis INDICATION: Sepsis pna, nausea and vomiting (requested by admitting team). COMPARISON: None. TECHNIQUE: Axial CT imaging of the chest, abdomen, and pelvis was performed with intravenous contrast. Multiplanar reformats were generated.  One or more dose reduction techniques were used on this CT: automated exposure control, adjustment of the mAs and/or kVp according to patient size, and iterative reconstruction techniques. The specific techniques used on this CT exam have been documented in the patient's electronic medical record. Digital Imaging and Communications in Medicine (DICOM) format image data are available to nonaffiliated external healthcare facilities or entities on a secure, media free, reciprocally searchable basis with patient authorization for at least a 12 month period after this study. _______________ FINDINGS: CHEST: LUNGS: No suspicious nodule or mass. There is multifocal bilateral consolidation. This is more confluent within the right upper and lower lobes. PLEURA: Normal, with no effusion or pneumothorax. AIRWAY: Normal. MEDIASTINUM: Normal heart size. No pericardial effusion. Given the limitations of cardiac motion, great vessels are unremarkable. LYMPH NODES: No enlarged lymph nodes. =============== ABDOMEN/PELVIS: LIVER, BILIARY: Liver attenuation is decreased suggestive of steatosis. No biliary dilation. Gallbladder is unremarkable. PANCREAS: Normal. SPLEEN: Normal. ADRENALS: Normal. KIDNEYS: Normal. LYMPH NODES: No enlarged lymph nodes. GASTROINTESTINAL TRACT: No bowel dilation or wall thickening. PELVIC ORGANS: Fibroid uterus. VASCULATURE: Unremarkable. BONES: No acute or aggressive osseous abnormalities identified. OTHER: None. _______________     1. Multilobar pneumonia 2. No significant findings within the abdomen or pelvis 3. Fibroid uterus    XR CHEST PORT    Result Date: 7/14/2021  EXAM: XR CHEST PORT CLINICAL INDICATION/HISTORY: respiratory failure -Additional: None COMPARISON: None TECHNIQUE: Portable frontal view of the chest _______________ FINDINGS: SUPPORT DEVICES: None. HEART AND MEDIASTINUM: Cardiomediastinal silhouette within normal limits. LUNGS AND PLEURAL SPACES: Bilateral patchy parenchymal/interstitial opacities, right greater left.  No large effusion or pneumothorax. _______________     Bilateral patchy parenchymal/interstitial opacities, right greater left. May reflect atypical infection.       Jesus Weaver MD

## 2021-07-16 NOTE — PROGRESS NOTES
Nutrition Note     Patient chart reviewed. Patient has multiple pressures injuries of various stages. Patient remains NPO day 2. Recommend advancing diet or considering other means of nutrition to meet nutritional needs. Will order Junaid BID if advanced to PO diet.     Electronically signed by Deion Ziegler RD on 7/16/2021 at 8:07 AM

## 2021-07-16 NOTE — CONSULTS
[address] [facility] 54: [phone]  [facility] [phone]     Patient Name: Yefri Matos  YOB: 1960    Date of follow up 7/16/2021   Reason for Consult: Care decisions  Requesting Provider: Dr. Kelli Vazquez   Primary Care Physician: Riley Apley., MD      SUMMARY:   Yefri Matos is a 64 y.o. female with a past history of MS, dementia, A. fib, decubitus ulcer and bed bound, who was admitted on 7/14/2021 from home with a diagnosis of sepsis. Current medical issues leading to Palliative Medicine involvement include: Thin, frail 70-year-old bed bound female with a history of multiple sclerosis and multiple other comorbid conditions presented to the ER with mental status change and respiratory distress. Palliative medicine is consulted for care decisions. CHIEF COMPLAINT: Mental status change and respiratory distress    HPI/SUBJECTIVE:    Past history as above. Ms. Harpreet Dia is a very debilitated, thin, frail, bed bound 70-year-old female with multiple sclerosis who presented to the ER with mental status change and respiratory distress. Per daughter and spouse, patient had vomited at home and then developed respiratory difficulty and became less responsive than normal.  Patient was found to be hypoxic and febrile to 104 in the ER. Patient is followed by home palliative through [facility]. 7/15/2021 Back on high flow this am. Appears to have increased WOB. Spoke with daughter Juan R Vaca. Continue current treatment. They would like to see if see has any improvement with IVAB. The patient is:   [] Verbal and participatory  [x] Non-participatory due to: Nonverbal    GOALS OF CARE: DNR/DNI  Patient/Health Care Proxy Stated Goals: Prolong life      TREATMENT PREFERENCES:   Code Status: DNR/DNI         PALLIATIVE DIAGNOSES:   1. Encounter for palliative care/goals of care  2. Sepsis  3. Seizure  4.  Debility       PLAN:   1. 7/16/2021 goals of care Ms Dg Scruggs seen along with Ms Jensen Parents. She did not open her eyes to her name or light touch for us this am. Back on high flow due to sats in high 80's and increased WOB. Very frail and thin appearing. Spoke with her daughter Zohreh Chow via phone. Zohreh Chow helps her father with medical decisions and understanding overall medical condition. She tells us they do wish for patient to return home with hospice services as they understand her MS is at end stage. Care decisions discussed at some length, including continuing same treatment, IVAB, weaning high flow as able in hopes her acute illness will improve and allow her to return home a bit stronger vs moving to comfort now with quick transition home with hospice, understanding high flow would need to be weaned. Yelena shared her father remains hopeful April Lujan will be able to be treated for the acute illness and feels she is improving a bit. At this time they wish to continue treatment. We shared with Zohreh Chow it is possible despite current aggressive treatment April Christiansens will continue decline, if this happens recommended to Zohreh Chow to consider and discuss  with her father to move April Lujan to comfort measures. Yelena voiced her understanding. We offered support to Zohreh Chow in this most difficult time. She is aware due to increased WOB and lower oxygen sats her mother was placed back on high flow. Current goals of care DNR/DNI limited interventions with continuation of current treatment. ( please see below for previous notes per palliative team)     2. Encounter for palliative care/goals of care - een at bedside along with Ms. Jensen Parents. Ms. Dg Scruggs is lying in bed with her eyes open and nonrebreather mask in place. Patient seems to blink in response to questions and verbal stimuli, nonverbal.  Appears comfortable, no facial grimacing observed. Follow-up conversation today at [phone] with daughter, Lino Ashley in ICU waiting room.   She shared that family is not yet ready to make any decision with regard to comfort measures however they are considering the option of home hospice. Hospice consult placed on 7/14/2021. Family awaiting contact. Yelena shared that she will be talking with her father (patient's ) this evening about the option of home hospice for patient. Supportive listening provided as Rochell Severance shared information about her mother. Palliative medicine contact information given to Rochell Severance who expressed appreciation for the follow-up discussion and information. Goals of care are DNR/DNI. Palliative medicine will continue to follow and support family as they make healthcare decisions for patient. 3. Sepsis -primary team managing, IV antibiotics    4. Seizure -primary team managing, neuro consult, head CT and EEG pending, IV Keppra and as needed Ativan    5. Debility - PPS 30 which indicates an overall poor prognosis, patient lives at home with her  and has a caregiver that comes in to assist daily. Patient is bed bound and wheelchair-bound at home. Followed by home palliative with [place]. 6. Initial consult note routed to primary continuity provider    7. Communicated plan of care with: Palliative IDT, daughter      Advance Care Planning:  [] The [facility] Interdisciplinary Team has updated the ACP Navigator with [address] and Patient Capacity    Primary Decision [facility] ([address]):   Primary Decision Maker: Ramiro marie - Spouse - [phone]    Secondary Decision Maker: Baldev Elkins - Daughter - [phone]    Medical Interventions: Limited additional interventions         As far as possible, the palliative care team has discussed with patient / health care proxy about goals of care / treatment preferences for patient.          HISTORY:     History obtained from: Chart and daughterYelena Das    Principal Problem:    Acute respiratory failure with hypoxia ([facility] [address] ()    Active Problems: Sepsis ([facility] [address] (7/14/2021)      MS (multiple sclerosis) ([facility] [address] ()      Seizure ([facility] [address] ()      Pressure injury of sacral region, stage 4 (HCC) ()      Elevated troponin ()      Lactic acidosis ()      Bedridden (7/14/2021)      Pneumonia (7/14/2021)      Cachexia ([facility] [address] (7/14/2021)      Severe protein-calorie malnutrition ([facility] [address] (7/15/2021)      Hypokalemia (7/15/2021)      Encounter for palliative care ()      Debility ()      Past Medical History:   Diagnosis Date    MS (multiple sclerosis) ([facility] [address]       No past surgical history on file. No family history on file. History reviewed, no pertinent family history.   Social History     Tobacco Use    Smoking status: Not on file   Substance Use Topics    Alcohol use: Not on file     Allergies   Allergen Reactions    Pcn [Penicillins] Unknown (comments)      Current Facility-Administered Medications   Medication Dose Route Frequency    cloNIDine (CATAPRES) 0.1 mg/24 hr patch 1 Patch  1 Patch TransDERmal Q7D    hydrALAZINE (APRESOLINE) 20 mg/mL injection 10 mg  10 mg IntraVENous Q6H PRN    baclofen intrathecal soln (Patient Supplied)  6.25 mcg/hr Intrathecal CONTINUOUS    metroNIDAZOLE (FLAGYL) IVPB premix 500 mg  500 mg IntraVENous Q8H    dextrose 5 % - 0.45% NaCl infusion  30 mL/hr IntraVENous CONTINUOUS    [START ON 7/17/2021] enoxaparin (LOVENOX) injection 30 mg  30 mg SubCUTAneous Q24H    glucose chewable tablet 16 g  4 Tablet Oral PRN    glucagon (GLUCAGEN) injection 1 mg  1 mg IntraMUSCular PRN    dextrose (D50W) injection syrg 12.5-25 g  25-50 mL IntraVENous PRN    morphine injection 1 mg  1 mg IntraVENous Q4H PRN    insulin lispro (HUMALOG) injection   SubCUTAneous Q6H    metoprolol (LOPRESSOR) injection 5 mg  5 mg IntraVENous Q6H    sodium chloride (NS) flush 5-40 mL  5-40 mL IntraVENous Q8H    sodium chloride (NS) flush 5-40 mL  5-40 mL IntraVENous PRN    acetaminophen (TYLENOL) tablet 650 mg  650 mg Oral Q6H PRN    Or    acetaminophen (TYLENOL) suppository 650 mg  650 mg Rectal Q6H PRN    polyethylene glycol (MIRALAX) packet 17 g  17 g Oral DAILY PRN    ondansetron (ZOFRAN ODT) tablet 4 mg  4 mg Oral Q8H PRN    Or    ondansetron (ZOFRAN) injection 4 mg  4 mg IntraVENous Q6H PRN    ELECTROLYTE REPLACEMENT PROTOCOL - Potassium Standard Dosing   1 Each Other PRN    ELECTROLYTE REPLACEMENT PROTOCOL - Magnesium   1 Each Other PRN    ELECTROLYTE REPLACEMENT PROTOCOL - Phosphorus  Standard Dosing  1 Each Other PRN    ELECTROLYTE REPLACEMENT PROTOCOL - Calcium   1 Each Other PRN    levETIRAcetam (KEPPRA) 1,000 mg in 0.9% sodium chloride 100 mL IVPB  1,000 mg IntraVENous Q12H    cefepime (MAXIPIME) 2 g in sterile water (preservative free) 10 mL IV syringe  2 g IntraVENous Q12H    LORazepam (ATIVAN) injection 1 mg  1 mg IntraVENous Q2H PRN    pantoprazole (PROTONIX) 40 mg in 0.9% sodium chloride 10 mL injection  40 mg IntraVENous DAILY    albuterol-ipratropium (DUO-NEB) 2.5 MG-0.5 MG/3 ML  3 mL Nebulization Q4H PRN    vancomycin (VANCOCIN) 500 mg in 0.9% sodium chloride (MBP/ADV) 100 mL MBP  500 mg IntraVENous Q24H    Vancomycin - Pharmacy to dose   1 Each Other Rx Dosing/Monitoring          Clinical Pain Assessment (nonverbal scale for nonverbal patients): Clinical Pain Assessment  Severity: 0     Activity (Movement): Lying quietly, normal position    Duration: for how long has pt been experiencing pain (e.g., 2 days, 1 month, years)  Frequency: how often pain is an issue (e.g., several times per day, once every few days, constant)     FUNCTIONAL ASSESSMENT:     Palliative Performance Scale (PPS):  PPS: 30    ECOG  ECOG Status : Completely disabled     PSYCHOSOCIAL/SPIRITUAL SCREENING:      Any spiritual / Taoism concerns: 7/15/2021unable to assess, 7/16 not able to assess   [] Yes /  [] No    Caregiver Burnout:  [] Yes /  [x] No /  [] No Caregiver Present      Anticipatory grief assessment: 7/15/2021unable to assess, 7/16/2021 not able to assess [] Normal  / [] Maladaptive        REVIEW OF SYSTEMS:     Systems: constitutional, ears/nose/mouth/throat, respiratory, gastrointestinal, genitourinary, musculoskeletal, integumentary, neurologic, psychiatric, endocrine. Positive findings noted below. Modified ESAS Completed by: provider           Pain: 0           Dyspnea: 2               Positive and pertinent negative findings in ROS are noted above in HPI. The following systems were [] reviewed / [x] unable to be reviewed as noted in HPI  Other findings are noted below. PHYSICAL EXAM:     Constitutional: frail appearing female who is lying in bed appears to be in mild distress due to tachypnea   Eyes closed   Cardiovascular: tachycardia   Respiratory: high flow donned, + tachypnea   Last bowel movement: None recorded  Skin: warm, dry  Neurologic: did not alert to her name or light touch       Other: Wt Readings from Last 3 Encounters:   07/15/21 33.6 kg (74 lb 1.2 oz)     Blood pressure (!) 146/66, pulse (!) 138, temperature (!) 101 °F (38.3 °C), resp. rate 26, height 5' 3\" (1.6 m), weight 33.6 kg (74 lb 1.2 oz), SpO2 95 %. Pain:  Pain Scale 1: FLACC                         LAB AND IMAGING FINDINGS:     Lab Results   Component Value Date/Time    WBC 22.2 (H) 07/16/2021 04:50 AM    HGB 9.4 (L) 07/16/2021 04:50 AM    PLATELET 530 35/75/6928 04:50 AM     Lab Results   Component Value Date/Time    Sodium 133 (L) 07/16/2021 04:50 AM    Potassium 3.5 07/16/2021 04:50 AM    Chloride 99 (L) 07/16/2021 04:50 AM    CO2 24 07/16/2021 04:50 AM    BUN 7 07/16/2021 04:50 AM    Creatinine 0.24 (L) 07/16/2021 04:50 AM    Calcium 8.2 (L) 07/16/2021 04:50 AM    Magnesium 1.8 07/16/2021 04:50 AM    Phosphorus 1.2 (L) 07/16/2021 04:50 AM      Lab Results   Component Value Date/Time    Alk.  phosphatase 115 07/14/2021 03:35 PM    Protein, total 9.5 (H) 07/14/2021 03:35 PM    Albumin 2.7 (L) 07/14/2021 03:35 PM    Globulin 6.8 (H) 07/14/2021 03:35 PM     Lab Results Component Value Date/Time    INR 1.3 (H) 07/14/2021 03:35 PM    Prothrombin time 16.0 (H) 07/14/2021 03:35 PM      No results found for: IRON, FE, TIBC, IBCT, PSAT, FERR   No results found for: PH, PCO2, PO2  No components found for: [place]   Lab Results   Component Value Date/Time    CK 37 07/15/2021 09:30 AM    CK - MB 2.7 07/15/2021 09:30 AM              Total time: 35 minutes     > 50% counseling / coordination:  Time spent in direct consultation with the patient, medical team, and family     Prolonged service was provided for  []30 min   []75 min in face to face time in the presence of the patient, spent as noted above. Time Start:   Time End:     Disclaimer: Sections of this note are dictated using utilizing voice recognition software, which may have resulted in some phonetic based errors in grammar and contents. Even though attempts were made to correct all the mistakes, some may have been missed, and remained in the body of the document. If questions arise, please contact our department.

## 2021-07-16 NOTE — PROGRESS NOTES
Pt placed on HFNC at this time due to decreasing O2 saturation into the 80's on 6 lpm n/c. Will continue to monitor:       07/15/21 2130   Vitals   Resp Rate 26   O2 Sat (%) 95 %   Oxygen Therapy   Pulse via Oximetry 128 beats per minute   O2 Device Heated; Hi flow nasal cannula   O2 Flow Rate (L/min) 45 l/min   O2 Temperature 93.2 °F (34 °C)   FIO2 (%) 85 %

## 2021-07-16 NOTE — PROGRESS NOTES
conducted a follow up consultation and spiritual assessment for Kaushal Fowler, who is a 64 y.o.,female. Prayer over patient who was not responsive to me. Met  yesterday \"She's not doing so good. \"  Daughter is providing help to her dad in understanding \"all this. \"  Family is leaning toward hospice. Continued the relationship of care and support. Offered prayer and assurance of continued prayer on patients behalf. Chart reviewed. Chaplains will continue to follow and will provide pastoral care as needed or requested.  recommends bedside caregivers page the  on duty if patient shows signs of acute spiritual or emotional distress. [address] [place], [place].    Board Certified   [phone] - Office

## 2021-07-16 NOTE — PROGRESS NOTES
Palliative Medicine     CODE STATUS: DNR/DNI     AMD Status: AMD on file naming her  and her daughter, Matilda Lund, as her surrogate decision makers     6/17/2021 0910 Seen today in room ICU 5 along with Peng Stokes NP. Lying in bed with eyes closed. No response to verbal stimulation. Tachycardic. Tachypneic with increased work of breathing. Hi Flow NC on. Skin very warm. [address] Telephone call with Matilda Lund and Peng Stokes NP. Brief medical update given. Based on care management note, asked if the family had decided for hospice admission. Matilda Lund said that she does think that hospice will be very soon but they want to see if the antibiotics will help improve her mom's respiratory status. Discussed possible transition to comfort care while Enid Sen is in the hospital and Matilda Lund said that her sister and dad are not quite ready for that but that she understands that option is always available. Encouraged Yelena and her father to continue conversations with ICU nursing and medical stff for updates. Clinical nurse and care manager, Karl Navas, update on the phone call. Disposition plan: anticipate home with hospice but do think there is a significant chance of death if patient is put on comfort measures while an inpatient. Palliative care will continue to follow Eugenia Wise during her hospitalization and support her family as they make healthcare decisions and establish goals of care.     Aly Durand, RN, MSN  Palliative Medicine  P: [phone]

## 2021-07-16 NOTE — PROGRESS NOTES
Problem: Falls - Risk of  Goal: *Absence of Falls  Description: Document Gaetano Torres Fall Risk and appropriate interventions in the flowsheet. Outcome: Progressing Towards Goal  Note: Fall Risk Interventions:       Mentation Interventions: Bed/chair exit alarm, Room close to nurse's station, More frequent rounding, Door open when patient unattended    Medication Interventions: Bed/chair exit alarm    Elimination Interventions: Call light in reach, Bed/chair exit alarm              Problem: Patient Education: Go to Patient Education Activity  Goal: Patient/Family Education  Variance Patient Condition  Note: Patient progress is has declined and patient family made aware from nursing and Palliative Care.  Discussed goal of taking her home with Hospice

## 2021-07-16 NOTE — PROGRESS NOTES
Cardiology Progress Note        Patient: Rafal Garvin        Sex: female          DOA: 7/14/2021  YOB: 1960      Age:  64 y.o.        LOS:  LOS: 2 days   Assessment/Plan     Principal Problem:    Acute respiratory failure with hypoxia ([address] ()    Active Problems:    Sepsis ([address] (7/14/2021)      MS (multiple sclerosis) (HCC) ()      Seizure (HCC) ()      Pressure injury of sacral region, stage 4 (HCC) ()      Elevated troponin ()      Lactic acidosis ()      Bedridden (7/14/2021)      Pneumonia (7/14/2021)      Cachexia ([address] (7/14/2021)      Severe protein-calorie malnutrition ([address] (7/15/2021)      Hypokalemia (7/15/2021)      Encounter for palliative care ()      Debility ()        Plan:  Sinus tachycardia due to sepsis/fever  Continue with metoprolol 5 mg every 6 hourly  No evidence of ACS  Discussed with patient's . I will sign off, please call if you have any questions or concerns. Discussed with Dr. Conrad Rogel. Elevated troponin  Tachycardia  Sepsis      Elevated troponin most likely due to sepsis and tachycardia, no evidence of ACS  Echocardiogram stable EF and normal wall motion. Heart rate is still fast 135/per minute continue treatment of sepsis and gentle hydration  Change metoprolol to 5 mg every 6 hourly  Discussed with patient's               Subjective:    cc:  Elevated troponin  Tachycardia  Sepsis        REVIEW OF SYSTEMS:     Limited due to noncoherent      Objective:      Visit Vitals  BP (!) 152/79   Pulse (!) 117   Temp 97.8 °F (36.6 °C)   Resp 21   Ht 5' 3\" (1.6 m)   Wt 33.6 kg (74 lb 1.2 oz)   SpO2 95%   BMI 13.12 kg/m²     Body mass index is 13.12 kg/m². Physical Exam:  General Appearance: Nonrebreather facemask  NECK: No JVD, no thyroidomeglay. LUNGS: Clear bilaterally. HEART: S1 cardia +S2 audible,    ABD: Non-tender, BS Audible    EXT: No edema, and no cysnosis.   VASCULAR EXAM: Pulses are intact.     .    Medication:  Current Facility-Administered Medications   Medication Dose Route Frequency    cloNIDine (CATAPRES) 0.1 mg/24 hr patch 1 Patch  1 Patch TransDERmal Q7D    hydrALAZINE (APRESOLINE) 20 mg/mL injection 10 mg  10 mg IntraVENous Q6H PRN    baclofen intrathecal soln (Patient Supplied)  6.25 mcg/hr Intrathecal CONTINUOUS    metroNIDAZOLE (FLAGYL) IVPB premix 500 mg  500 mg IntraVENous Q8H    dextrose 5 % - 0.45% NaCl infusion  30 mL/hr IntraVENous CONTINUOUS    [START ON 7/17/2021] enoxaparin (LOVENOX) injection 30 mg  30 mg SubCUTAneous Q24H    glucose chewable tablet 16 g  4 Tablet Oral PRN    glucagon (GLUCAGEN) injection 1 mg  1 mg IntraMUSCular PRN    dextrose (D50W) injection syrg 12.5-25 g  25-50 mL IntraVENous PRN    morphine injection 1 mg  1 mg IntraVENous Q4H PRN    insulin lispro (HUMALOG) injection   SubCUTAneous Q6H    metoprolol (LOPRESSOR) injection 5 mg  5 mg IntraVENous Q6H    sodium chloride (NS) flush 5-40 mL  5-40 mL IntraVENous Q8H    sodium chloride (NS) flush 5-40 mL  5-40 mL IntraVENous PRN    acetaminophen (TYLENOL) tablet 650 mg  650 mg Oral Q6H PRN    Or    acetaminophen (TYLENOL) suppository 650 mg  650 mg Rectal Q6H PRN    polyethylene glycol (MIRALAX) packet 17 g  17 g Oral DAILY PRN    ondansetron (ZOFRAN ODT) tablet 4 mg  4 mg Oral Q8H PRN    Or    ondansetron (ZOFRAN) injection 4 mg  4 mg IntraVENous Q6H PRN    ELECTROLYTE REPLACEMENT PROTOCOL - Potassium Standard Dosing   1 Each Other PRN    ELECTROLYTE REPLACEMENT PROTOCOL - Magnesium   1 Each Other PRN    ELECTROLYTE REPLACEMENT PROTOCOL - Phosphorus  Standard Dosing  1 Each Other PRN    ELECTROLYTE REPLACEMENT PROTOCOL - Calcium   1 Each Other PRN    levETIRAcetam (KEPPRA) 1,000 mg in 0.9% sodium chloride 100 mL IVPB  1,000 mg IntraVENous Q12H    cefepime (MAXIPIME) 2 g in sterile water (preservative free) 10 mL IV syringe  2 g IntraVENous Q12H    LORazepam (ATIVAN) injection 1 mg 1 mg IntraVENous Q2H PRN    pantoprazole (PROTONIX) 40 mg in 0.9% sodium chloride 10 mL injection  40 mg IntraVENous DAILY    albuterol-ipratropium (DUO-NEB) 2.5 MG-0.5 MG/3 ML  3 mL Nebulization Q4H PRN    vancomycin (VANCOCIN) 500 mg in 0.9% sodium chloride (MBP/ADV) 100 mL MBP  500 mg IntraVENous Q24H    Vancomycin - Pharmacy to dose   1 Each Other Rx Dosing/Monitoring               Lab/Data Reviewed:  Procedures/imaging: see electronic medical records for all procedures/Xrays   and details which were not copied into this note but were reviewed prior to creation of Plan       All lab results for the last 24 hours reviewed. Recent Labs     07/16/21  0450 07/15/21  0347 07/15/21  0140   WBC 22.2* 20.5* 20.8*   HGB 9.4* 9.3* 9.6*   HCT 29.4* 29.6* 30.9*    421* 451*     Recent Labs     07/16/21  0450 07/15/21  1412 07/15/21  0347   * 140 137   K 3.5 3.3* 2.9*   CL 99* 106 101   CO2 24 28 26   * 101* 157*   BUN 7 12 14   CREA 0.24* 0.28* 0.25*   CA 8.2* 8.9 8.3*       RADIOLOGY:  CT Results  (Last 48 hours)               07/14/21 1757  CT CHEST ABD PELV W CONT Final result    Impression:      1. Multilobar pneumonia       2. No significant findings within the abdomen or pelvis       3. Fibroid uterus       Narrative:  EXAM: CT of the chest, abdomen, and pelvis       INDICATION: Sepsis pna, nausea and vomiting (requested by admitting team). COMPARISON: None. TECHNIQUE: Axial CT imaging of the chest, abdomen, and pelvis was performed with   intravenous contrast. Multiplanar reformats were generated. One or more dose   reduction techniques were used on this CT: automated exposure control,   adjustment of the mAs and/or kVp according to patient size, and iterative   reconstruction techniques. The specific techniques used on this CT exam have   been documented in the patient's electronic medical record.        Digital Imaging and Communications in Medicine (DICOM) format image data are   available to nonaffiliated external healthcare facilities or entities on a   secure, media free, reciprocally searchable basis with patient authorization for   at least a 12 month period after this study. _______________       FINDINGS:       CHEST:       LUNGS: No suspicious nodule or mass. There is multifocal bilateral   consolidation. This is more confluent within the right upper and lower lobes. PLEURA: Normal, with no effusion or pneumothorax. AIRWAY: Normal.       MEDIASTINUM: Normal heart size. No pericardial effusion. Given the limitations   of cardiac motion, great vessels are unremarkable. LYMPH NODES: No enlarged lymph nodes.       ===============       ABDOMEN/PELVIS:       LIVER, BILIARY: Liver attenuation is decreased suggestive of steatosis. No   biliary dilation. Gallbladder is unremarkable. PANCREAS: Normal.       SPLEEN: Normal.       ADRENALS: Normal.       KIDNEYS: Normal.       LYMPH NODES: No enlarged lymph nodes. GASTROINTESTINAL TRACT: No bowel dilation or wall thickening. PELVIC ORGANS: Fibroid uterus. VASCULATURE: Unremarkable. BONES: No acute or aggressive osseous abnormalities identified. OTHER: None.       _______________           07/14/21 1757  CT HEAD WO CONT Final result    Impression:      No acute intracranial findings       Narrative:  EXAM: CT head       INDICATION: Altered mental status       COMPARISON: None. TECHNIQUE: Axial CT imaging of the head was performed without intravenous   contrast.One or more dose reduction techniques were used on this CT: automated   exposure control, adjustment of the mAs and/or kVp according to patient size,   and iterative reconstruction techniques. The specific techniques used on this   CT exam have been documented in the patient's electronic medical record.        Digital Imaging and Communications in Medicine (DICOM) format image data are   available to nonaffiliated external healthcare facilities or entities on a   secure, media free, reciprocally searchable basis with patient authorization for   at least a 12 month period after this study. _______________       FINDINGS:       BRAIN AND POSTERIOR FOSSA: The sulci, folia, ventricles and basal cisterns are   within normal limits for the patient's age. There is no intracranial hemorrhage,   mass effect, or midline shift. There are areas of decreased attenuation within   the periventricular and subcortical white matter. EXTRA-AXIAL SPACES AND MENINGES: There are no abnormal extra-axial fluid   collections. CALVARIUM: Intact. SINUSES: Clear. OTHER: None.       _______________               CXR Results  (Last 48 hours)               07/14/21 1629  XR CHEST PORT Final result    Impression:      Bilateral patchy parenchymal/interstitial opacities, right greater left. May   reflect atypical infection. Narrative:  EXAM: XR CHEST PORT       CLINICAL INDICATION/HISTORY: respiratory failure   -Additional: None       COMPARISON: None       TECHNIQUE: Portable frontal view of the chest       _______________       FINDINGS:       SUPPORT DEVICES: None. HEART AND MEDIASTINUM: Cardiomediastinal silhouette within normal limits. LUNGS AND PLEURAL SPACES: Bilateral patchy parenchymal/interstitial opacities,   right greater left.  No large effusion or pneumothorax.       _______________                   Cardiology Procedures:   Results for orders placed or performed during the hospital encounter of 07/14/21   EKG, 12 LEAD, INITIAL   Result Value Ref Range    Ventricular Rate 162 BPM    Atrial Rate 162 BPM    P-R Interval 96 ms    QRS Duration 72 ms    Q-T Interval 312 ms    QTC Calculation (Bezet) 512 ms    Calculated P Axis 67 degrees    Calculated R Axis -103 degrees    Calculated T Axis 88 degrees    Diagnosis       Sinus tachycardia with short ID  Right superior axis deviation  Right ventricular hypertrophy  Nonspecific ST and T wave abnormality  Abnormal ECG  No previous ECGs available  Confirmed by Vicki Tam MD. ([id number]) on 7/14/2021 9:56:18 PM        Echo Results  (Last 48 hours)    None       Cardiolite (Tc-99m Sestamibi) stress test    Signed By: Bria Hernandez MD     July 16, 2021

## 2021-07-16 NOTE — PROGRESS NOTES
Return call receive  From palliative care team member Rand Perdue , informed cm that daughter would like pt to remain over weekend to see how she does and will possibly finalize decision beginning of week regarding hospice. .    Cm reached out to patients daughter Mrs. Whitney Screen [phone] to discuss plan of care,family decided to use [facility] services in their home, daughter would like services to start as soon as pt is stable enough to transfer, cm did provide daughter with cm contact number, after conversation cm updated bedside nurse and left voice message with palliative care team of daughter's wishes, pt at this time is on HFNC, pt will need weaning plan prior to discharge,  also aware and would like cm to get palliative involve with home d/c plan.

## 2021-07-16 NOTE — PROGRESS NOTES
07/16/21 0941   Vitals   Temp (!) 101.7 °F (38.7 °C)  (tylenol supp.  administered. )   Rechecked at 1021 - it is now 101.0

## 2021-07-16 NOTE — DIABETES MGMT
GLYCEMIC CONTROL PLAN OF CARE     Assessment:  Pt admitted with sepsis, pneumonia, acute respiratory failure, seizure. Past Medical History includes MS, multiple pressure wounds. Current A1C in prediabetes range. Currently with orders for corrective lispro q 6 hours as needed. Most blood glucose readings have been in or close to target range. Pt currently NPO with IVF - D5 at 50 ml/hr. Recommendations:  1. Continue corrective lispro, normal insulin sensitivity q 6 hours as needed  2. Monitor glycemic control. Most recent blood glucose values:  7/16:  Lab - 176;  POC - 191  7/15:  Lab -  170, 157, 101;  POC - 166, 105, 130, 161        Current A1C of 6.2% is equivalent to average blood glucose of 126 mg/dl over the past 2-3 months.     Current hospital diabetes medications: corrective lispro, normal insulin sensitivity q 6 hours    Previous day's insulin requirements: 2 units     Home diabetes medications: none - no history of DM    Diet:  DIET NPO  IVF:  D5 at 50 ml/hr  Education:  ____Refer to Diabetes Education Record             x____Education not indicated at this time      Jacquie Lacey [address], [address] [place] [address], [address] [place] [address]  Diabetes and Glycemic Control   [employer]  Office:  [phone]  Pager:  [phone]

## 2021-07-16 NOTE — PROGRESS NOTES
[facility]  Pulmonary, Critical Care, and Sleep Medicine    Name: Bandar Swanson MRN: [id number]   : 1960 Hospital: [facility] [place]   Date: 2021        Pulmonary Critical Care Note    IMPRESSION:   · Acute hypoxic respiratory failure · J96.01 ·   Aspiration pneumonia · J69.0 ·   Sepsis with organ dysfunction · A41.9, R65.20 ·   Leukocytosis · D72.829 ·   Elevated troponin · R77.8 ·   Lactic acidosis, resolved · E87.2 ·   Seizure d/o ·  ·   Multiple sclerosis with spastic paralysis ·      Patient Active Problem List   Diagnosis Code    Sepsis ([address] A41.9    MS (multiple sclerosis) ([address] G35    Seizure ([address] R56.9    Pressure injury of sacral region, stage 4 ([address] L89.154    Acute respiratory failure with hypoxia ([address] J96.01    Elevated troponin R77.8    Lactic acidosis E87.2    Bedridden Z74.01    Pneumonia J18.9    Cachexia (HCC) R64    Severe protein-calorie malnutrition ([address] E43    Hypokalemia E87.6    Encounter for palliative care Z51.5    Debility R53.81      RECOMMENDATIONS:   Respiratory: Supplemental O2 via HFNC, titrate flow, fio2 for goal SPO2> 91%  X-ray chest for follow-up in a.m.  CT chest 21 - Multilobar pneumonia likely from aspiration pneumonia  Aspiration prevention bundle followed, head of the bed at 30' all times  Bronchodilators as needed, pulmonary hygiene care    ID: Sepsis bundle per hospital protocol  Antibiotic choice: cefepime, Vancomycin; PCN allergy - tolerating cefepime, dose rounded for weight and renal function with help of clinical pharmacist  Blood Cultures 2021NGTD  Urine culture 2021in process  Deescalate antibiotic when appropriate.   CT chest 2021 with multi lobar pneumonia likely from aspiration contributing to sepsis  CT abdomen, pelvis 2021- nil acute  Role of decub ulcer infection likely - wound care consulted    CVS: Monitor Hemodynamics - stable  Sinus tachycardia on EKG likely from sepsis  Cardiology following - elevated troponin demand related per cardiology  Change Lovenox to 30 mg daily for DVT prophylaxis  ECHO 7/15/21  · LV: Estimated LVEF is 55 - 60%. Normal cavity size, wall thickness and systolic function (ejection fraction normal). Mild (grade 1) left ventricular diastolic dysfunction E/E' ratio = 8.02.  · RV: Borderline low systolic function. · Tricuspid regurgitation is inadequate for estimation of right ventricular systolic pressure    Renal: Monitor S. Na+, change IV fluid to D5 1/2 NS at 30 mL an hour  Monitor renal functions, lytes, urine output  Replace lytes per unit protocol    Heme: Monitor CBC -drop in hemoglobin expected and likely dilutional  No evidence of active bleeding  Endo: Glycemic control as needed  GI: diet - NPO  Neurology: non-verbal, lethargic, does not follow commands  Keppra increased to 1000 mg Q12 per neurology  CT head 7/14/21 - nil acute; unable to do MRI secondary to baclofen pump  EEG 7/15/21 - abnormal due to diffuse generalized slowing, which is an indicator of diffuse cerebral dysfunction from any cause including -but not limited to- toxic, metabolic and infectious etiologies. There are left hemisphere intermittent sharp waves in left frontotemporal regions, which is consistent with history of epilepsy. No active seizure on EEG. Monitor in ICU. Overall poor prognosis. Family is aware and discussing end-of-life care through help of palliative care. As per last discussions monitor patient over the weekend and further determination for hospice/comfort care probably early next week as per clinical course and per family    Will defer respective systems problem management to primary and other consultant and follow patient in ICU with primary and other medical team  Further recommendations will be based on the patient's response to recommended treatment and results of the investigation ordered.   Quality Care: PPI, DVT prophylaxis, HOB elevated, Infection control all reviewed and addressed. PAIN AND SEDATION: judicious opiate; avoid sedative if possible  Skin/Wound: skin breakdowns LT hip, sacrum  Prophylaxis: DVT and GI Prophylaxis reviewed. Restraints: none   PT/OT eval and treat: as needed when stable   Lines/Tubes: PIV     ADVANCE DIRECTIVE: DNR. Palliative care consulted  DISCUSSION: Events and notes from last 24 hours reviewed. Care plan discussed with nursing, hospitalist, RT  D/w family-daughter at the bedside 7/15/2021 (answered all questions to satisfaction). Quality Care: PPI, DVT prophylaxis, HOB elevated, Infection control all reviewed and addressed. High complexity decision making was performed during the evaluation of this patient at high risk for decompensation with multiple organ involvement. Total critical care time spent rendering complex decision making and > 50% time spent in face to face evaluation exclusive of procedures/family discussion/coordination of care: 36 minutes. Subjective/History:   Ms. Jessica Vo has been seen and evaluated as Dr. Yvon Medrano requested now for assisting in ICU care for Acute hypoxic respiratory failure, aspiration pneumonia with sepsis with organ dysfunction. Patient is a 64 y.o. female with PMHx for MS, seizure d/o, decub ulcers presented from home via EMS for progressive change in mentation and lethargy, poor appetite, increased work of breathing, fever, vomiting, episode of aspiration. The patient is critically ill and can not provide additional history due to unable to comprehend. Information limited and provided by . Pt is bed bound at home from MS; feeds orally, able to smile back and may say \"hi\" back once in while otherwise non-verbal. In Er, pt noted to be hypoxic requiring NRBM for increased work of breathing and hypoxia. XR chest showed bibasilar infiltrates. Labs showed leukocytosis, elevated lactic acid, troponin, pro-BNP. Pt is admitted to ICU for close monitoring.  She is DNR/DNI per ER provider and . Other information is limited. 07/16/21  Patient seen at bedside in ICU room #105  She was switched to high flow nasal cannula and SPO2 100% on monitor  Remains nonverbal, frail and chronically ill looking  Heart rate fluctuating with increased rate following high-grade fever  Intermittent response to metoprolol 5 mg every 6 hrs  Hemodynamically stable; no reported episode of arrhythmia  Electrolytes being replaced; good urine output  No reported episode of nausea, vomiting  No evidence of bleeding anywhere  [facility] was not contacted by staff on anything about patient overnight. Review of Systems:  Review of systems not obtained due to patient factors. Past Medical History:  Past Medical History:   Diagnosis Date    MS (multiple sclerosis) ([facility]         Past Surgical History:  No past surgical history on file. Medications:  Prior to Admission medications    Medication Sig Start Date End Date Taking? Authorizing Provider   baclofen (LIORESAL) 10 mg tablet Take  by mouth three (3) times daily. Yes Provider, Historical   metoprolol succinate (TOPROL-XL) 25 mg XL tablet Take 25 mg by mouth daily. Provider, Historical   folic acid (FOLVITE) 1 mg tablet Take 1 mg by mouth daily. Provider, Historical   predniSONE (DELTASONE) 10 mg tablet Take 5 mg by mouth daily. Provider, Historical   cholecalciferol, vitamin D3, (Vitamin D3) 50 mcg (2,000 unit) tab Take 2,000 Units by mouth daily. Provider, Historical   clonazePAM (KlonoPIN) 0.5 mg tablet Take 0.25 mg by mouth two (2) times a day. Provider, Historical   baclofen (LIORESAL) 20 mg tablet Take 20 mg by mouth two (2) times a day. Provider, Historical   levETIRAcetam (Keppra) 500 mg tablet Take 500 mg by mouth two (2) times a day.  2 tabs in the am; 1 tab at night    Provider, Historical       Current Facility-Administered Medications   Medication Dose Route Frequency    potassium chloride 10 mEq in 100 ml IVPB  10 mEq IntraVENous Q1H    sodium phosphate 15 mmol in 0.9% sodium chloride 250 mL infusion  15 mmol IntraVENous ONCE    cloNIDine (CATAPRES) 0.1 mg/24 hr patch 1 Patch  1 Patch TransDERmal Q7D    baclofen intrathecal soln (Patient Supplied)  6.25 mcg/hr Intrathecal CONTINUOUS    metroNIDAZOLE (FLAGYL) IVPB premix 500 mg  500 mg IntraVENous Q8H    dextrose 5 % - 0.45% NaCl infusion  30 mL/hr IntraVENous CONTINUOUS    [START ON 2021] enoxaparin (LOVENOX) injection 30 mg  30 mg SubCUTAneous Q24H    insulin lispro (HUMALOG) injection   SubCUTAneous Q6H    metoprolol (LOPRESSOR) injection 5 mg  5 mg IntraVENous Q6H    sodium chloride (NS) flush 5-40 mL  5-40 mL IntraVENous Q8H    levETIRAcetam (KEPPRA) 1,000 mg in 0.9% sodium chloride 100 mL IVPB  1,000 mg IntraVENous Q12H    cefepime (MAXIPIME) 2 g in sterile water (preservative free) 10 mL IV syringe  2 g IntraVENous Q12H    pantoprazole (PROTONIX) 40 mg in 0.9% sodium chloride 10 mL injection  40 mg IntraVENous DAILY    vancomycin (VANCOCIN) 500 mg in 0.9% sodium chloride (MBP/ADV) 100 mL MBP  500 mg IntraVENous Q24H    Vancomycin - Pharmacy to dose   1 Each Other Rx Dosing/Monitoring       Allergy:  Allergies   Allergen Reactions    Pcn [Penicillins] Unknown (comments)        Social History:  Social History     Tobacco Use    Smoking status: Not on file   Substance Use Topics    Alcohol use: Not on file    Drug use: Not on file        Family History:  No family history on file. Objective:   Vital Signs:    Blood pressure (!) 146/66, pulse (!) 144, temperature (!) 101 °F (38.3 °C), resp. rate 28, height 5' 3\" (1.6 m), weight 33.6 kg (74 lb 1.2 oz), SpO2 94 %. Body mass index is 13.12 kg/m². O2 Device: Heated, Hi flow nasal cannula   O2 Flow Rate (L/min): 45 l/min   Temp (24hrs), Av.9 °F (37.7 °C), Min:96.9 °F (36.1 °C), Max:101.9 °F (38.8 °C)         Intake/Output:   Last shift:      No intake/output data recorded.   Last 3 shifts: 07/14 1901 - 07/16 0700  In: 3050.2 [I.V.:3050.2]  Out: 2500 [Urine:2500]    Intake/Output Summary (Last 24 hours) at 7/16/2021 1144  Last data filed at 7/16/2021 0408  Gross per 24 hour   Intake 1759 ml   Output 2300 ml   Net -541 ml         Physical Exam:  General: Awake, lethargic, does not follow command, nonverbal, chronically ill looking, cachectic, on HFNC   HEENT: PERRL, fundi benign, visible oral mucosa dry  Neck: No abnormally enlarged lymph nodes or thyroid, supple  Chest: normal  Lungs: moderate air entry, few rhonchi scattered bilaterally, normal percussion anterior chest wall bilaterally, no tenderness/ rash  Heart: Regular rate and rhythm, S1S2 present or without murmur or extra heart sounds  Abdomen: non distended, bowel sounds normoactive, tympanic, soft without significant tenderness, masses, organomegaly or guarding, rigidity, rebound  Extremity: no edema, cyanosis, clubbing; LT sacral large decub wound with some necrotic tissue  Capillary refill: normal  Neuro: lethargic, awake, doesn't track or follow any commands, non-verbal, exam limitations  Skin: Skin color, texture, turgor unchanged    Data:     Recent Results (from the past 24 hour(s))   GLUCOSE, POC    Collection Time: 07/15/21 12:00 PM   Result Value Ref Range    Glucose (POC) 105 70 - 952 mg/dL   METABOLIC PANEL, BASIC    Collection Time: 07/15/21  2:12 PM   Result Value Ref Range    Sodium 140 136 - 145 mmol/L    Potassium 3.3 (L) 3.5 - 5.5 mmol/L    Chloride 106 100 - 111 mmol/L    CO2 28 21 - 32 mmol/L    Anion gap 6 3.0 - 18 mmol/L    Glucose 101 (H) 74 - 99 mg/dL    BUN 12 7.0 - 18 MG/DL    Creatinine 0.28 (L) 0.6 - 1.3 MG/DL    BUN/Creatinine ratio 43 (H) 12 - 20      GFR est AA >60 >60 ml/min/1.73m2    GFR est non-AA >60 >60 ml/min/1.73m2    Calcium 8.9 8.5 - 10.1 MG/DL   GLUCOSE, POC    Collection Time: 07/15/21  6:07 PM   Result Value Ref Range    Glucose (POC) 130 (H) 70 - 110 mg/dL   GLUCOSE, POC    Collection Time: 07/15/21 11:21 PM   Result Value Ref Range    Glucose (POC) 161 (H) 70 - 730 mg/dL   METABOLIC PANEL, BASIC    Collection Time: 07/16/21  4:50 AM   Result Value Ref Range    Sodium 133 (L) 136 - 145 mmol/L    Potassium 3.5 3.5 - 5.5 mmol/L    Chloride 99 (L) 100 - 111 mmol/L    CO2 24 21 - 32 mmol/L    Anion gap 10 3.0 - 18 mmol/L    Glucose 176 (H) 74 - 99 mg/dL    BUN 7 7.0 - 18 MG/DL    Creatinine 0.24 (L) 0.6 - 1.3 MG/DL    BUN/Creatinine ratio 29 (H) 12 - 20      GFR est AA >60 >60 ml/min/1.73m2    GFR est non-AA >60 >60 ml/min/1.73m2    Calcium 8.2 (L) 8.5 - 10.1 MG/DL   CBC WITH AUTOMATED DIFF    Collection Time: 07/16/21  4:50 AM   Result Value Ref Range    WBC 22.2 (H) 4.6 - 13.2 K/uL    RBC 3.31 (L) 4.20 - 5.30 M/uL    HGB 9.4 (L) 12.0 - 16.0 g/dL    HCT 29.4 (L) 35.0 - 45.0 %    MCV 88.8 74.0 - 97.0 FL    MCH 28.4 24.0 - 34.0 PG    MCHC 32.0 31.0 - 37.0 g/dL    RDW 15.7 (H) 11.6 - 14.5 %    PLATELET 136 515 - 852 K/uL    MPV 9.3 9.2 - 11.8 FL    NEUTROPHILS 86 (H) 40 - 73 %    BAND NEUTROPHILS 11 (H) 0 - 5 %    LYMPHOCYTES 2 (L) 21 - 52 %    MONOCYTES 1 (L) 3 - 10 %    EOSINOPHILS 0 0 - 5 %    BASOPHILS 0 0 - 2 %    ABS. NEUTROPHILS 21.6 (H) 1.8 - 8.0 K/UL    ABS. LYMPHOCYTES 0.4 (L) 0.9 - 3.6 K/UL    ABS. MONOCYTES 0.2 0.05 - 1.2 K/UL    ABS. EOSINOPHILS 0.0 0.0 - 0.4 K/UL    ABS.  BASOPHILS 0.0 0.0 - 0.1 K/UL    DF MANUAL      RBC COMMENTS ANISOCYTOSIS  1+       MAGNESIUM    Collection Time: 07/16/21  4:50 AM   Result Value Ref Range    Magnesium 1.8 1.6 - 2.6 mg/dL   PHOSPHORUS    Collection Time: 07/16/21  4:50 AM   Result Value Ref Range    Phosphorus 1.2 (L) 2.5 - 4.9 MG/DL   GLUCOSE, POC    Collection Time: 07/16/21  5:44 AM   Result Value Ref Range    Glucose (POC) 191 (H) 70 - 110 mg/dL           Recent Labs     07/14/21  1536   HCO3I 26.1*       All Micro Results     Procedure Component Value Units Date/Time    CULTURE, URINE [[id number]] Collected: 07/14/21 1840    Order Status: Completed Specimen: Cath Urine Updated: 07/15/21 1457    CULTURE, BLOOD [[id number]] Collected: 07/14/21 1539    Order Status: Completed Specimen: Blood Updated: 07/15/21 0717     Special Requests: NO SPECIAL REQUESTS        Culture result: NO GROWTH AFTER 14 HOURS       CULTURE, BLOOD [[id number]] Collected: 07/14/21 1545    Order Status: Completed Specimen: Blood Updated: 07/15/21 0717     Special Requests: NO SPECIAL REQUESTS        Culture result: NO GROWTH AFTER 14 HOURS       COVID-19 RAPID TEST [[id number]] Collected: 07/14/21 1650    Order Status: Completed Specimen: Nasopharyngeal Updated: 07/14/21 1722     Specimen source NASAL        COVID-19 rapid test Not detected        Comment: Rapid Abbott ID Now       Rapid NAAT:  The specimen is NEGATIVE for SARS-CoV-2, the novel coronavirus associated with COVID-19. Negative results should be treated as presumptive and, if inconsistent with clinical signs and symptoms or necessary for patient management, should be tested with an alternative molecular assay. Negative results do not preclude SARS-CoV-2 infection and should not be used as the sole basis for patient management decisions. This test has been authorized by the FDA under an Emergency Use Authorization (EUA) for use by authorized laboratories. Fact sheet for Healthcare Providers: ConventionUpdate.co.nz  Fact sheet for Patients: ConventionUpdate.co.nz       Methodology: Isothermal Nucleic Acid Amplification               Telemetry: normal sinus rhythm      Imaging:  [x]I have personally reviewed the patients chest radiographs images and report     Results from Hospital Encounter encounter on 07/14/21    XR CHEST PORT    Narrative  EXAM: XR CHEST PORT    CLINICAL INDICATION/HISTORY: respiratory failure  -Additional: None    COMPARISON: None    TECHNIQUE: Portable frontal view of the chest    _______________    FINDINGS:    SUPPORT DEVICES: None.     HEART AND MEDIASTINUM: Cardiomediastinal silhouette within normal limits. LUNGS AND PLEURAL SPACES: Bilateral patchy parenchymal/interstitial opacities,  right greater left. No large effusion or pneumothorax.    _______________    Impression  Bilateral patchy parenchymal/interstitial opacities, right greater left. May  reflect atypical infection. Results from Hospital Encounter encounter on 07/14/21    CT CHEST ABD PELV W CONT    Narrative  EXAM: CT of the chest, abdomen, and pelvis    INDICATION: Sepsis pna, nausea and vomiting (requested by admitting team). COMPARISON: None. TECHNIQUE: Axial CT imaging of the chest, abdomen, and pelvis was performed with  intravenous contrast. Multiplanar reformats were generated. One or more dose  reduction techniques were used on this CT: automated exposure control,  adjustment of the mAs and/or kVp according to patient size, and iterative  reconstruction techniques. The specific techniques used on this CT exam have  been documented in the patient's electronic medical record. Digital Imaging and Communications in Medicine (DICOM) format image data are  available to nonaffiliated external healthcare facilities or entities on a  secure, media free, reciprocally searchable basis with patient authorization for  at least a 12 month period after this study. _______________    FINDINGS:    CHEST:    LUNGS: No suspicious nodule or mass. There is multifocal bilateral  consolidation. This is more confluent within the right upper and lower lobes. PLEURA: Normal, with no effusion or pneumothorax. AIRWAY: Normal.    MEDIASTINUM: Normal heart size. No pericardial effusion. Given the limitations  of cardiac motion, great vessels are unremarkable. LYMPH NODES: No enlarged lymph nodes.    ===============    ABDOMEN/PELVIS:    LIVER, BILIARY: Liver attenuation is decreased suggestive of steatosis. No  biliary dilation. Gallbladder is unremarkable.     PANCREAS: Normal.    SPLEEN: Normal.    ADRENALS: Normal.    KIDNEYS: Normal.    LYMPH NODES: No enlarged lymph nodes. GASTROINTESTINAL TRACT: No bowel dilation or wall thickening. PELVIC ORGANS: Fibroid uterus. VASCULATURE: Unremarkable. BONES: No acute or aggressive osseous abnormalities identified. OTHER: None.    _______________    Impression  1. Multilobar pneumonia    2. No significant findings within the abdomen or pelvis    3.  Fibroid uterus      CT Head 7/14/21  IMPRESSION   No acute intracranial findings        [x]See my orders for details          Maria C Morin MD

## 2021-07-17 NOTE — PROGRESS NOTES
1900: Bedside and Verbal shift change report given to Lashonda Zimmerman RN (oncoming nurse) by Liborio Marshall RN (offgoing nurse). Report included the following information SBAR, Kardex, Intake/Output, MAR, Recent Results, Med Rec Status, Cardiac Rhythm Sinus Tach and Alarm Parameters . 2000: Shift assessment completed, Pt alert to self but disoriented to time, place and situation. Pt following commands at this time with periodic confusion. Pt tolerating HFNC with FiO2 of 50% and SPO2 97% at this time. Pt bathed at this time. 0000: Reassessment completed. 0400: Reassessment completed. Phlebotomy at bedside. Left wrist IV access removed. 0715: Bedside and Verbal shift change report given to Liborio Marshall RN (oncoming nurse) by Lashonda Zimmerman RN (offgoing nurse). Report included the following information SBAR, Kardex, Intake/Output, MAR, Recent Results, Med Rec Status, Cardiac Rhythm Sinus Tach/NSR and Alarm Parameters .

## 2021-07-17 NOTE — PROGRESS NOTES
07/17/21 0900   Vitals   Temp 99.9 °F (37.7 °C)   Temp Source Axillary   Tylenol Suppository administered.

## 2021-07-17 NOTE — PROGRESS NOTES
Hospitalist Progress Note    Patient: Eugenia Wise MRN: [id number]  CSN: [id number]    YOB: 1960  Age: 64 y.o. Sex: female    DOA: 7/14/2021 LOS:  LOS: 3 days            Assessment/Plan     1. Acute hypoxemic respiratory failure  2. Aspiration pneumonia  3. Decubitus ulceration  4. Elevated troponin- trending downards  5. MS  6. Seizure disorder  7. hypokalemia    Plan:  - continue broad spectrum antibiotics  - IV fluids  - supportive care. Family considering comfort measures    Patient Active Problem List   Diagnosis Code    Sepsis ([address] A41.9    MS (multiple sclerosis) ([address] G35    Seizure ([address] R56.9    Pressure injury of sacral region, stage 4 ([address] L89.154    Acute respiratory failure with hypoxia ([address] J96.01    Elevated troponin R77.8    Lactic acidosis E87.2    Bedridden Z74.01    Pneumonia J18.9    Cachexia ([address] R64    Severe protein-calorie malnutrition ([address] E43    Hypokalemia E87.6    Encounter for palliative care Z51.5    Debility R53.81               Subjective:    cc: sepsis  No acute events overnight        REVIEW OF SYSTEMS:  General: No fevers or chills. Cardiovascular: No chest pain or pressure. No palpitations. Pulmonary: No shortness of breath. Gastrointestinal: No nausea, vomiting. Objective:        Vital signs/Intake and Output:  Visit Vitals  BP (!) 121/58   Pulse (!) 120   Temp 99.2 °F (37.3 °C)   Resp 21   Ht 5' 3\" (1.6 m)   Wt 32.6 kg (71 lb 13.9 oz)   SpO2 100%   BMI 12.73 kg/m²     Current Shift:  No intake/output data recorded. Last three shifts:  07/15 1901 - 07/17 0700  In: 2475.2 [I.V.:2475.2]  Out: 9524 [Urine:3450]    Body mass index is 12.73 kg/m².     Physical Exam:  GEN:chronically ill appearing  EYES: conjunctiva normal, lids with out lesions  HEENT: MMM, No thyromegaly, no lymphadenopathy  HEART: RRR +S1 +S2, no JVD, pulses 2+ distally  LUNGS: CTA B/L no wheezes, rales or rhonchi  ABDOMEN: + BS, soft NT/ND no organomegaly,  No rebound  EXTREMITIES: No edema cyanosis, cap refill normal   SKIN: no rashes,  no nodules, normal turgor  Current Facility-Administered Medications   Medication Dose Route Frequency    sodium phosphate 9 mmol in 0.9% sodium chloride 250 mL infusion  9 mmol IntraVENous ONCE    potassium chloride 10 mEq in 100 ml IVPB  10 mEq IntraVENous Q1H    Vancomycin Trough Level - 30 minutes prior to 17:00 dose 7/17/21  1 Each Other ONCE    cloNIDine (CATAPRES) 0.1 mg/24 hr patch 1 Patch  1 Patch TransDERmal Q7D    hydrALAZINE (APRESOLINE) 20 mg/mL injection 10 mg  10 mg IntraVENous Q6H PRN    baclofen intrathecal soln (Patient Supplied)  6.25 mcg/hr Intrathecal CONTINUOUS    metroNIDAZOLE (FLAGYL) IVPB premix 500 mg  500 mg IntraVENous Q8H    dextrose 5 % - 0.45% NaCl infusion  30 mL/hr IntraVENous CONTINUOUS    enoxaparin (LOVENOX) injection 30 mg  30 mg SubCUTAneous Q24H    glucose chewable tablet 16 g  4 Tablet Oral PRN    glucagon (GLUCAGEN) injection 1 mg  1 mg IntraMUSCular PRN    dextrose (D50W) injection syrg 12.5-25 g  25-50 mL IntraVENous PRN    morphine injection 1 mg  1 mg IntraVENous Q4H PRN    insulin lispro (HUMALOG) injection   SubCUTAneous Q6H    metoprolol (LOPRESSOR) injection 5 mg  5 mg IntraVENous Q6H    sodium chloride (NS) flush 5-40 mL  5-40 mL IntraVENous Q8H    sodium chloride (NS) flush 5-40 mL  5-40 mL IntraVENous PRN    acetaminophen (TYLENOL) tablet 650 mg  650 mg Oral Q6H PRN    Or    acetaminophen (TYLENOL) suppository 650 mg  650 mg Rectal Q6H PRN    polyethylene glycol (MIRALAX) packet 17 g  17 g Oral DAILY PRN    ondansetron (ZOFRAN ODT) tablet 4 mg  4 mg Oral Q8H PRN    Or    ondansetron (ZOFRAN) injection 4 mg  4 mg IntraVENous Q6H PRN    ELECTROLYTE REPLACEMENT PROTOCOL - Potassium Standard Dosing   1 Each Other PRN    ELECTROLYTE REPLACEMENT PROTOCOL - Magnesium   1 Each Other PRN    ELECTROLYTE REPLACEMENT PROTOCOL - Phosphorus  Standard Dosing  1 Each Other PRN    ELECTROLYTE REPLACEMENT PROTOCOL - Calcium   1 Each Other PRN    levETIRAcetam (KEPPRA) 1,000 mg in 0.9% sodium chloride 100 mL IVPB  1,000 mg IntraVENous Q12H    cefepime (MAXIPIME) 2 g in sterile water (preservative free) 10 mL IV syringe  2 g IntraVENous Q12H    LORazepam (ATIVAN) injection 1 mg  1 mg IntraVENous Q2H PRN    pantoprazole (PROTONIX) 40 mg in 0.9% sodium chloride 10 mL injection  40 mg IntraVENous DAILY    albuterol-ipratropium (DUO-NEB) 2.5 MG-0.5 MG/3 ML  3 mL Nebulization Q4H PRN    vancomycin (VANCOCIN) 500 mg in 0.9% sodium chloride (MBP/ADV) 100 mL MBP  500 mg IntraVENous Q24H    Vancomycin - Pharmacy to dose   1 Each Other Rx Dosing/Monitoring         All the patient's labs over the past 24 hours were reviewed both during my initial daily workflow process and at the time notated as \"note time\" in [employer]. (It is not time stamped separately in this workflow.)  Select labs are listed below. Labs: Results:       Chemistry Recent Labs     07/17/21  0617 07/16/21  1942 07/16/21  0450 07/15/21  1412 07/15/21  1412 07/15/21  0140 07/14/21  1535   GLU 99  --  176*  --  101*   < > 182*     --  133*  --  140   < > 129*   K 3.4* 3.5 3.5   < > 3.3*   < > 4.6     --  99*  --  106   < > 94*   CO2 23  --  24  --  28   < > 24   BUN 8  --  7  --  12   < > 18   CREA 0.23*  --  0.24*  --  0.28*   < > 0.68   CA 8.2*  --  8.2*  --  8.9   < > 10.3*   AGAP 10  --  10  --  6   < > 11   BUCR 35*  --  29*  --  43*   < > 26*   AP  --   --   --   --   --   --  115   TP  --   --   --   --   --   --  9.5*   ALB  --   --   --   --   --   --  2.7*   GLOB  --   --   --   --   --   --  6.8*   AGRAT  --   --   --   --   --   --  0.4*    < > = values in this interval not displayed.       CBC w/Diff Recent Labs     07/17/21  0617 07/16/21  0450 07/15/21  0347   WBC 24.8* 22.2* 20.5*   RBC 3.73* 3.31* 3.30*   HGB 10.4* 9.4* 9.3*   HCT 33.6* 29.4* 29.6*    401 421* GRANS 91* 86* 69   LYMPH 2* 2* 5*   EOS 0 0 0      Cardiac Enzymes Recent Labs     07/15/21  0930 07/15/21  0140   CPK 37 47   CKND1 7.3* 5.5*      Coagulation Recent Labs     07/14/21  1535   PTP 16.0*   INR 1.3*               Liver Enzymes Recent Labs     07/14/21  1535   TP 9.5*   ALB 2.7*             Procedures/imaging: see electronic medical records for all procedures/Xrays and details which were not copied into this note but were reviewed prior to creation of Plan    Imaging personally reviewed:            Gigi Richards, DO  Internal Medicine/Geriatrics

## 2021-07-17 NOTE — PROGRESS NOTES
Problem: Pressure Injury - Risk of  Goal: *Prevention of pressure injury  Description: Document Alvin Scale and appropriate interventions in the flowsheet. 7/17/2021 0331 by Jackie Shine  Outcome: Progressing Towards Goal  Note: Pressure Injury Interventions:  Sensory Interventions: Assess changes in LOC, Assess need for specialty bed, Avoid rigorous massage over bony prominences, Float heels, Keep linens dry and wrinkle-free, Maintain/enhance activity level, Minimize linen layers, Monitor skin under medical devices, Pressure redistribution bed/mattress (bed type), Pad between skin to skin, Turn and reposition approx. every two hours (pillows and wedges if needed)    Moisture Interventions: Absorbent underpads, Apply protective barrier, creams and emollients, Assess need for specialty bed, Check for incontinence Q2 hours and as needed, Internal/External urinary devices, Minimize layers, Moisture barrier, Offer toileting Q_hr    Activity Interventions: Pressure redistribution bed/mattress(bed type), PT/OT evaluation    Mobility Interventions: HOB 30 degrees or less, Pressure redistribution bed/mattress (bed type), PT/OT evaluation, Turn and reposition approx.  every two hours(pillow and wedges), Float heels    Nutrition Interventions: Discuss nutritional consult with provider    Friction and Shear Interventions: HOB 30 degrees or less, Foam dressings/transparent film/skin sealants, Minimize layers, Transferring/repositioning devices, Apply protective barrier, creams and emollients, Lift sheet             7/17/2021 0330 by Abdias LANDEROS  Outcome: Progressing Towards Goal  Note: Pressure Injury Interventions:  Sensory Interventions: Assess changes in LOC, Assess need for specialty bed, Avoid rigorous massage over bony prominences, Float heels, Keep linens dry and wrinkle-free, Maintain/enhance activity level, Minimize linen layers, Monitor skin under medical devices, Pressure redistribution bed/mattress (bed type), Pad between skin to skin, Turn and reposition approx. every two hours (pillows and wedges if needed)    Moisture Interventions: Absorbent underpads, Apply protective barrier, creams and emollients, Assess need for specialty bed, Check for incontinence Q2 hours and as needed, Internal/External urinary devices, Minimize layers, Moisture barrier, Offer toileting Q_hr    Activity Interventions: Pressure redistribution bed/mattress(bed type), PT/OT evaluation    Mobility Interventions: HOB 30 degrees or less, Pressure redistribution bed/mattress (bed type), PT/OT evaluation, Turn and reposition approx. every two hours(pillow and wedges), Float heels    Nutrition Interventions: Discuss nutritional consult with provider    Friction and Shear Interventions: HOB 30 degrees or less, Foam dressings/transparent film/skin sealants, Minimize layers, Transferring/repositioning devices, Apply protective barrier, creams and emollients, Lift sheet             7/17/2021 0328 by Tank LANDEROS  Outcome: Progressing Towards Goal  Note: Pressure Injury Interventions:  Sensory Interventions: Assess changes in LOC, Assess need for specialty bed, Avoid rigorous massage over bony prominences, Float heels, Keep linens dry and wrinkle-free, Maintain/enhance activity level, Minimize linen layers, Monitor skin under medical devices, Pressure redistribution bed/mattress (bed type), Pad between skin to skin, Turn and reposition approx.  every two hours (pillows and wedges if needed)    Moisture Interventions: Absorbent underpads, Apply protective barrier, creams and emollients, Assess need for specialty bed, Check for incontinence Q2 hours and as needed, Internal/External urinary devices, Minimize layers, Moisture barrier, Offer toileting Q_hr    Activity Interventions: Pressure redistribution bed/mattress(bed type), PT/OT evaluation    Mobility Interventions: HOB 30 degrees or less, Pressure redistribution bed/mattress (bed type), PT/OT evaluation, Turn and reposition approx.  every two hours(pillow and wedges), Float heels    Nutrition Interventions: Discuss nutritional consult with provider    Friction and Shear Interventions: HOB 30 degrees or less, Foam dressings/transparent film/skin sealants, Minimize layers, Transferring/repositioning devices, Apply protective barrier, creams and emollients, Lift sheet                Problem: Patient Education: Go to Patient Education Activity  Goal: Patient/Family Education  7/17/2021 0331 by Boby Machado  Outcome: Progressing Towards Goal  7/17/2021 0330 by Boby Machado  Outcome: Progressing Towards Goal  7/17/2021 0328 by Boby Machado  Outcome: Progressing Towards Goal     Problem: Sepsis: Day of Diagnosis  Goal: Off Pathway (Use only if patient is Off Pathway)  7/17/2021 0331 by Boby Machado  Outcome: Progressing Towards Goal  7/17/2021 0330 by Boby Machado  Outcome: Progressing Towards Goal  7/17/2021 0328 by Boby Machado  Outcome: Progressing Towards Goal  Goal: *Fluid resuscitation  7/17/2021 0331 by Boby Machado  Outcome: Progressing Towards Goal  7/17/2021 0330 by Boby Machado  Outcome: Progressing Towards Goal  7/17/2021 0328 by Boby Machado  Outcome: Progressing Towards Goal  Goal: *Paired blood cultures prior to first dose of antibiotic  7/17/2021 0331 by Boby Machado  Outcome: Progressing Towards Goal  7/17/2021 0330 by Boby Machado  Outcome: Progressing Towards Goal  7/17/2021 0328 by Boby Machado  Outcome: Progressing Towards Goal  Goal: *First dose of  appropriate antibiotic within 3 hours of arrival to ED, within 1 hour of arrival to ICU  7/17/2021 0331 by Boby Machado  Outcome: Progressing Towards Goal  7/17/2021 0330 by Boby Machado  Outcome: Progressing Towards Goal  7/17/2021 0328 by Boby Machado  Outcome: Progressing Towards Goal  Goal: *Lactic acid with first set of blood cultures  7/17/2021 0331 by Boby Machado  Outcome: Progressing Towards Goal  7/17/2021 0330 by Bradd Colon  Outcome: Progressing Towards Goal  7/17/2021 0328 by Bradd Colon  Outcome: Progressing Towards Goal  Goal: *Pneumococcal immunization (if eligible)  7/17/2021 0331 by Bradd Colon  Outcome: Progressing Towards Goal  7/17/2021 0330 by Bradd Colon  Outcome: Progressing Towards Goal  7/17/2021 0328 by Bradd Colon  Outcome: Progressing Towards Goal  Goal: *Influenza immunization (if eligible)  7/17/2021 0331 by Bradd Alex  Outcome: Progressing Towards Goal  7/17/2021 0330 by Bradd Alex  Outcome: Progressing Towards Goal  7/17/2021 0328 by Bradd Colon  Outcome: Progressing Towards Goal  Goal: Activity/Safety  7/17/2021 0331 by Bradd Colon  Outcome: Progressing Towards Goal  7/17/2021 0330 by Bradd Colon  Outcome: Progressing Towards Goal  7/17/2021 0328 by Bradd Colon  Outcome: Progressing Towards Goal  Goal: Consults, if ordered  7/17/2021 0331 by Bradd Colon  Outcome: Progressing Towards Goal  7/17/2021 0330 by Bradd Alex  Outcome: Progressing Towards Goal  7/17/2021 0328 by Bradd Colon  Outcome: Progressing Towards Goal  Goal: Diagnostic Test/Procedures  7/17/2021 0331 by Bradd Alex  Outcome: Progressing Towards Goal  7/17/2021 0330 by Bradd Alex  Outcome: Progressing Towards Goal  7/17/2021 0328 by Bradd Colon  Outcome: Progressing Towards Goal  Goal: Nutrition/Diet  7/17/2021 0331 by Bradd Colon  Outcome: Progressing Towards Goal  7/17/2021 0330 by Bradd Colon  Outcome: Progressing Towards Goal  7/17/2021 0328 by Bradd Colon  Outcome: Progressing Towards Goal  Goal: Discharge Planning  7/17/2021 0331 by Bradd Colon  Outcome: Progressing Towards Goal  7/17/2021 0330 by Bradd Colon  Outcome: Progressing Towards Goal  7/17/2021 0328 by Bradd Colon  Outcome: Progressing Towards Goal  Goal: Medications  7/17/2021 0331 by Keod Colon  Outcome: Progressing Towards Goal  7/17/2021 0330 by Karolyn Avalos  Outcome: Progressing Towards Goal  7/17/2021 0328 by Karolyn Avalos  Outcome: Progressing Towards Goal  Goal: Respiratory  7/17/2021 0331 by Karolyn Avalos  Outcome: Progressing Towards Goal  7/17/2021 0330 by Karolyn Avalos  Outcome: Progressing Towards Goal  7/17/2021 0328 by Karolyn Avalos  Outcome: Progressing Towards Goal  Goal: Treatments/Interventions/Procedures  7/17/2021 0331 by Karolyn Avalos  Outcome: Progressing Towards Goal  7/17/2021 0330 by Karolyn Avalos  Outcome: Progressing Towards Goal  7/17/2021 0328 by Karolyn Avalos  Outcome: Progressing Towards Goal  Goal: Psychosocial  7/17/2021 0331 by Karolyn Avalos  Outcome: Progressing Towards Goal  7/17/2021 0330 by Karolyn Avalos  Outcome: Progressing Towards Goal  7/17/2021 0328 by Karolyn Avalos  Outcome: Progressing Towards Goal     Problem: Falls - Risk of  Goal: *Absence of Falls  Description: Document Dulcecharisse Borreroon Fall Risk and appropriate interventions in the flowsheet.   7/17/2021 0331 by Karolyn Avalos  Outcome: Progressing Towards Goal  Note: Fall Risk Interventions:       Mentation Interventions: Adequate sleep, hydration, pain control, Bed/chair exit alarm, Door open when patient unattended, Evaluate medications/consider consulting pharmacy, More frequent rounding, Room close to nurse's station, Toileting rounds, Update white board, Reorient patient    Medication Interventions: Bed/chair exit alarm, Evaluate medications/consider consulting pharmacy, Patient to call before getting OOB, Teach patient to arise slowly    Elimination Interventions: Bed/chair exit alarm, Call light in reach, Toileting schedule/hourly rounds           7/17/2021 0330 by Karolyn Avalos  Outcome: Progressing Towards Goal  Note: Fall Risk Interventions:       Mentation Interventions: Adequate sleep, hydration, pain control, Bed/chair exit alarm, Door open when patient unattended, Evaluate medications/consider consulting pharmacy, More frequent rounding, Room close to nurse's station, Toileting rounds, Update white board, Reorient patient    Medication Interventions: Bed/chair exit alarm, Evaluate medications/consider consulting pharmacy, Patient to call before getting OOB, Teach patient to arise slowly    Elimination Interventions: Bed/chair exit alarm, Call light in reach, Toileting schedule/hourly rounds           7/17/2021 0328 by Gladys Powell  Outcome: Progressing Towards Goal  Note: Fall Risk Interventions:       Mentation Interventions: Adequate sleep, hydration, pain control, Bed/chair exit alarm, Door open when patient unattended, Evaluate medications/consider consulting pharmacy, More frequent rounding, Room close to nurse's station, Toileting rounds, Update white board, Reorient patient    Medication Interventions: Bed/chair exit alarm, Evaluate medications/consider consulting pharmacy, Patient to call before getting OOB, Teach patient to arise slowly    Elimination Interventions: Bed/chair exit alarm, Call light in reach, Toileting schedule/hourly rounds              Problem: Patient Education: Go to Patient Education Activity  Goal: Patient/Family Education  7/17/2021 0331 by Gladys Powell  Outcome: Progressing Towards Goal  7/17/2021 0330 by Gladys Powell  Outcome: Progressing Towards Goal  7/17/2021 0328 by Gladys Powell  Outcome: Progressing Towards Goal     Problem: Nutrition Deficit  Goal: *Optimize nutritional status  7/17/2021 0331 by Gladys Powell  Outcome: Progressing Towards Goal  7/17/2021 0330 by Gladys Powell  Outcome: Progressing Towards Goal  7/17/2021 0328 by Gladys Powell  Outcome: Progressing Towards Goal

## 2021-07-17 NOTE — PROGRESS NOTES
Pharmacy Dosing Services: Vancomycin   Scr = 0.23  CrCl ~ 43.4 ml/min   WBC = 24.8  Temp = 99.2  Pt weight = 32.6 kg   Vancomycin trough = 1.1 ?? ==> repeated level to confirm accuracy ==> 1.3  Will order vancomycin 1000 mg IVPB x 1 dose now then Vancomycin 500 mg IV q12h  Pharmacy to continue to follow daily and make changes to dose and/or frequency based on clinical status.   Adolfo Whalen [facility] [phone]

## 2021-07-17 NOTE — PROGRESS NOTES
[facility]  Pulmonary, Critical Care, and Sleep Medicine    Name: Jamal Junior MRN: [id number]   : 1960 Hospital: [facility] [place]   Date: 2021        Pulmonary Critical Care Note    IMPRESSION:   · Acute hypoxic respiratory failure · J96.01 ·   Aspiration pneumonia · J69.0 ·   Sepsis with organ dysfunction · A41.9, R65.20 ·   Leukocytosis · D72.829 ·   Elevated troponin · R77.8 ·   Lactic acidosis, resolved · E87.2 ·   Seizure d/o ·  ·   Multiple sclerosis with spastic paralysis ·      Patient Active Problem List   Diagnosis Code    Sepsis ([address] A41.9    MS (multiple sclerosis) ([address] G35    Seizure ([address] R56.9    Pressure injury of sacral region, stage 4 ([address] L89.154    Acute respiratory failure with hypoxia ([address] J96.01    Elevated troponin R77.8    Lactic acidosis E87.2    Bedridden Z74.01    Pneumonia J18.9    Cachexia ([address] R64    Severe protein-calorie malnutrition ([address] E43    Hypokalemia E87.6    Encounter for palliative care Z51.5    Debility R53.81      RECOMMENDATIONS:   Respiratory: Supplemental O2 via NC, titrate flow, fio2 for goal SPO2> 91%  Improving O2 flow requirement clinically  X-ray chest for follow-up in a.m. showed possible atelectasis from mucus plug vs pl effusion   CT chest 21 - Multilobar pneumonia likely from aspiration pneumonia  Aspiration prevention bundle followed, head of the bed at 30' all times  Bronchodilators as needed, pulmonary hygiene care    ID: Sepsis bundle per hospital protocol  Antibiotic choice: cefepime, Vancomycin; PCN allergy - tolerating cefepime, dose rounded for weight and renal function with help of clinical pharmacist  Blood Cultures 2021NGTD  Urine culture 2021 negative  Deescalate antibiotic as appropriate.  No wound culture available so will finish course with Vancomycin give persistent leukocytosis  CT chest 2021 with multi lobar pneumonia likely from aspiration contributing to sepsis  CT abdomen, pelvis 7/14/2021- nil acute  Role of decub ulcer infection likely - wound care consulted    CVS: Hemodynamics - stable  Sinus tachycardia on EKG likely from sepsis - improving HR with gentle hydration  Cardiology following - elevated troponin demand related per cardiology  Lovenox to 30 mg daily for DVT prophylaxis  ECHO 7/15/21  · LV: Estimated LVEF is 55 - 60%. Normal cavity size, wall thickness and systolic function (ejection fraction normal). Mild (grade 1) left ventricular diastolic dysfunction E/E' ratio = 8.02.  · RV: Borderline low systolic function. · Tricuspid regurgitation is inadequate for estimation of right ventricular systolic pressure    Renal: Monitor S. Na+, continue IV fluid to D5 1/2 NS at 30 mL an hour  Monitor renal functions, lytes, urine output  Replace lytes per unit protocol and recheck as needed    Heme: Monitor CBC - stable hgb  No evidence of active bleeding  Endo: Glycemic control as needed  GI: diet - NPO; if continues to show signs of improvement then may consider swallow evaluation when patient is close to her baseline  Neurology: non-verbal, improving alertness per  and in comparison to yesterday, does not follow commands  Keppra 1000 mg Q12 per neurology. No further seizure disorder  CT head 7/14/21 - nil acute; unable to do MRI secondary to baclofen pump  EEG 7/15/21 - abnormal due to diffuse generalized slowing, which is an indicator of diffuse cerebral dysfunction from any cause including -but not limited to- toxic, metabolic and infectious etiologies. There are left hemisphere intermittent sharp waves in left frontotemporal regions, which is consistent with history of epilepsy. No active seizure on EEG. Monitor in ICU. Overall poor prognosis. Family is aware and discussing end-of-life care through help of palliative care.  As per last discussions between palliative care and family to monitor patient over the weekend and further determination for hospice/comfort care probably early next week as per clinical course and per family    Will defer respective systems problem management to primary and other consultant and follow patient in ICU with primary and other medical team  Further recommendations will be based on the patient's response to recommended treatment and results of the investigation ordered. Quality Care: PPI, DVT prophylaxis, HOB elevated, Infection control all reviewed and addressed. PAIN AND SEDATION: judicious opiate; avoid sedative if possible  Skin/Wound: skin breakdowns LT hip, sacrum  Prophylaxis: DVT and GI Prophylaxis reviewed. Restraints: none   PT/OT eval and treat: as needed when stable   Lines/Tubes: PIV     ADVANCE DIRECTIVE: DNR. Palliative care consulted  DISCUSSION: Events and notes from last 24 hours reviewed. Care plan discussed with nursing, hospitalist, RT  D/w family-  at the bedside 7/17/2021 (answered all questions to satisfaction). Quality Care: PPI, DVT prophylaxis, HOB elevated, Infection control all reviewed and addressed. High complexity decision making was performed during the evaluation of this patient at high risk for decompensation with multiple organ involvement. Total critical care time spent rendering complex decision making and > 50% time spent in face to face evaluation exclusive of procedures/family discussion/coordination of care: 36 minutes. Subjective/History:   Ms. Sylwia Lazcano has been seen and evaluated as Dr. Shantell More requested now for assisting in ICU care for Acute hypoxic respiratory failure, aspiration pneumonia with sepsis with organ dysfunction. Patient is a 64 y.o. female with PMHx for MS, seizure d/o, decub ulcers presented from home via EMS for progressive change in mentation and lethargy, poor appetite, increased work of breathing, fever, vomiting, episode of aspiration. The patient is critically ill and can not provide additional history due to unable to comprehend. Information limited and provided by . Pt is bed bound at home from MS; feeds orally, able to smile back and may say \"hi\" back once in while otherwise non-verbal. In Er, pt noted to be hypoxic requiring NRBM for increased work of breathing and hypoxia. XR chest showed bibasilar infiltrates. Labs showed leukocytosis, elevated lactic acid, troponin, pro-BNP. Pt is admitted to ICU for close monitoring. She is DNR/DNI per ER provider and . Other information is limited. 07/17/21  Patient seen at bedside in ICU room #105  Patient able to be switched to nasal cannula at 6 L/min from [facility]  Reviewed x-ray chest results with  at the bedside  Patient remains nonverbal but more alert nodding head when  is speaking  No arrhythmia or seizure-like activities  Off-and-on sinus tachycardia; hemodynamically stable  Low-grade fever  Electrolytes being replaced; good urine output  No nausea or vomiting episode; remain n.p.o. No evidence of bleeding anywhere  I was not contacted by staff on anything about patient overnight. Review of Systems:  Review of systems not obtained due to patient factors. Past Medical History:  Past Medical History:   Diagnosis Date    MS (multiple sclerosis) ([facility] [address]         Past Surgical History:  No past surgical history on file. Medications:  Prior to Admission medications    Medication Sig Start Date End Date Taking? Authorizing Provider   baclofen (LIORESAL) 10 mg tablet Take  by mouth three (3) times daily. Yes Provider, Historical   metoprolol succinate (TOPROL-XL) 25 mg XL tablet Take 25 mg by mouth daily. Provider, Historical   folic acid (FOLVITE) 1 mg tablet Take 1 mg by mouth daily. Provider, Historical   predniSONE (DELTASONE) 10 mg tablet Take 5 mg by mouth daily. Provider, Historical   cholecalciferol, vitamin D3, (Vitamin D3) 50 mcg (2,000 unit) tab Take 2,000 Units by mouth daily.     Provider, Historical   clonazePAM (KlonoPIN) 0.5 mg tablet Take 0.25 mg by mouth two (2) times a day. Provider, Historical   baclofen (LIORESAL) 20 mg tablet Take 20 mg by mouth two (2) times a day. Provider, Historical   levETIRAcetam (Keppra) 500 mg tablet Take 500 mg by mouth two (2) times a day.  2 tabs in the am; 1 tab at night    Provider, Historical       Current Facility-Administered Medications   Medication Dose Route Frequency    sodium phosphate 9 mmol in 0.9% sodium chloride 250 mL infusion  9 mmol IntraVENous ONCE    potassium chloride 10 mEq in 100 ml IVPB  10 mEq IntraVENous Q1H    Vancomycin Trough Level - 30 minutes prior to 17:00 dose 7/17/21  1 Each Other ONCE    cloNIDine (CATAPRES) 0.1 mg/24 hr patch 1 Patch  1 Patch TransDERmal Q7D    baclofen intrathecal soln (Patient Supplied)  6.25 mcg/hr Intrathecal CONTINUOUS    metroNIDAZOLE (FLAGYL) IVPB premix 500 mg  500 mg IntraVENous Q8H    dextrose 5 % - 0.45% NaCl infusion  30 mL/hr IntraVENous CONTINUOUS    enoxaparin (LOVENOX) injection 30 mg  30 mg SubCUTAneous Q24H    insulin lispro (HUMALOG) injection   SubCUTAneous Q6H    metoprolol (LOPRESSOR) injection 5 mg  5 mg IntraVENous Q6H    sodium chloride (NS) flush 5-40 mL  5-40 mL IntraVENous Q8H    levETIRAcetam (KEPPRA) 1,000 mg in 0.9% sodium chloride 100 mL IVPB  1,000 mg IntraVENous Q12H    cefepime (MAXIPIME) 2 g in sterile water (preservative free) 10 mL IV syringe  2 g IntraVENous Q12H    pantoprazole (PROTONIX) 40 mg in 0.9% sodium chloride 10 mL injection  40 mg IntraVENous DAILY    vancomycin (VANCOCIN) 500 mg in 0.9% sodium chloride (MBP/ADV) 100 mL MBP  500 mg IntraVENous Q24H    Vancomycin - Pharmacy to dose   1 Each Other Rx Dosing/Monitoring       Allergy:  Allergies   Allergen Reactions    Pcn [Penicillins] Unknown (comments)        Social History:  Social History     Tobacco Use    Smoking status: Not on file   Substance Use Topics    Alcohol use: Not on file    Drug use: Not on file        Family History:  No family history on file. Objective:   Vital Signs:    Blood pressure (!) 121/58, pulse (!) 120, temperature 99.2 °F (37.3 °C), resp. rate 21, height 5' 3\" (1.6 m), weight 32.6 kg (71 lb 13.9 oz), SpO2 100 %. Body mass index is 12.73 kg/m². O2 Device: Nasal cannula   O2 Flow Rate (L/min): 6 l/min   Temp (24hrs), Av.5 °F (36.9 °C), Min:97.1 °F (36.2 °C), Max:100.2 °F (37.9 °C)         Intake/Output:   Last shift:      No intake/output data recorded.   Last 3 shifts: 07/15 1901 -  0700  In: 2475.2 [I.V.:2475.2]  Out: 9940 [Urine:3450]    Intake/Output Summary (Last 24 hours) at 2021 1240  Last data filed at 2021 0400  Gross per 24 hour   Intake 650.91 ml   Output 1600 ml   Net -949.09 ml         Physical Exam:  General: Awake, improved alertness compared to yesterday, does not follow command, nonverbal, chronically ill looking, cachectic, on NC   HEENT: PERRL, fundi benign, visible oral mucosa dry  Neck: No abnormally enlarged lymph nodes or thyroid, supple  Chest: normal  Lungs: moderate air entry, few rhonchi scattered bilaterally, minimal dullness percussion lateral chest wall bilaterally, no tenderness/ rash  Heart: Regular rate and rhythm, S1S2 present or without murmur or extra heart sounds  Abdomen: scaphoid, bowel sounds normoactive, tympanic, soft without significant tenderness, masses, organomegaly or guarding, rigidity, rebound  Extremity: no edema, cyanosis, clubbing; LT sacral large decub wound without drainage or bleeding  Capillary refill: normal  Neuro: Awake, more alert, track , does not follow any commands, non-verbal, exam limitations  Skin: Skin color, texture, turgor fair      Data:     Recent Results (from the past 24 hour(s))   POTASSIUM    Collection Time: 21  7:42 PM   Result Value Ref Range    Potassium 3.5 3.5 - 5.5 mmol/L   MAGNESIUM    Collection Time: 21  7:42 PM   Result Value Ref Range    Magnesium 2.0 1.6 - 2.6 mg/dL   PHOSPHORUS Collection Time: 07/16/21  7:42 PM   Result Value Ref Range    Phosphorus 1.3 (L) 2.5 - 4.9 MG/DL   GLUCOSE, POC    Collection Time: 07/16/21 11:09 PM   Result Value Ref Range    Glucose (POC) 172 (H) 70 - 110 mg/dL   GLUCOSE, POC    Collection Time: 07/17/21  5:26 AM   Result Value Ref Range    Glucose (POC) 110 70 - 110 mg/dL   CBC WITH AUTOMATED DIFF    Collection Time: 07/17/21  6:17 AM   Result Value Ref Range    WBC 24.8 (H) 4.6 - 13.2 K/uL    RBC 3.73 (L) 4.20 - 5.30 M/uL    HGB 10.4 (L) 12.0 - 16.0 g/dL    HCT 33.6 (L) 35.0 - 45.0 %    MCV 90.1 74.0 - 97.0 FL    MCH 27.9 24.0 - 34.0 PG    MCHC 31.0 31.0 - 37.0 g/dL    RDW 16.0 (H) 11.6 - 14.5 %    PLATELET 283 586 - 421 K/uL    MPV 9.3 9.2 - 11.8 FL    NEUTROPHILS 91 (H) 40 - 73 %    BAND NEUTROPHILS 6 (H) 0 - 5 %    LYMPHOCYTES 2 (L) 21 - 52 %    MONOCYTES 1 (L) 3 - 10 %    EOSINOPHILS 0 0 - 5 %    BASOPHILS 0 0 - 2 %    ABS. NEUTROPHILS 24.1 (H) 1.8 - 8.0 K/UL    ABS. LYMPHOCYTES 0.5 (L) 0.9 - 3.6 K/UL    ABS. MONOCYTES 0.2 0.05 - 1.2 K/UL    ABS. EOSINOPHILS 0.0 0.0 - 0.4 K/UL    ABS.  BASOPHILS 0.0 0.0 - 0.1 K/UL    DF MANUAL      RBC COMMENTS NORMOCYTIC, NORMOCHROMIC     MAGNESIUM    Collection Time: 07/17/21  6:17 AM   Result Value Ref Range    Magnesium 2.1 1.6 - 2.6 mg/dL   PHOSPHORUS    Collection Time: 07/17/21  6:17 AM   Result Value Ref Range    Phosphorus 1.6 (L) 2.5 - 4.9 MG/DL   METABOLIC PANEL, BASIC    Collection Time: 07/17/21  6:17 AM   Result Value Ref Range    Sodium 137 136 - 145 mmol/L    Potassium 3.4 (L) 3.5 - 5.5 mmol/L    Chloride 104 100 - 111 mmol/L    CO2 23 21 - 32 mmol/L    Anion gap 10 3.0 - 18 mmol/L    Glucose 99 74 - 99 mg/dL    BUN 8 7.0 - 18 MG/DL    Creatinine 0.23 (L) 0.6 - 1.3 MG/DL    BUN/Creatinine ratio 35 (H) 12 - 20      GFR est AA >60 >60 ml/min/1.73m2    GFR est non-AA >60 >60 ml/min/1.73m2    Calcium 8.2 (L) 8.5 - 10.1 MG/DL   GLUCOSE, POC    Collection Time: 07/17/21 12:02 PM   Result Value Ref Range Glucose (POC) 117 (H) 70 - 110 mg/dL           Recent Labs     07/14/21  1536   HCO3I 26.1*       All Micro Results     Procedure Component Value Units Date/Time    CULTURE, BLOOD [[id number]] Collected: 07/14/21 1545    Order Status: Completed Specimen: Blood Updated: 07/17/21 0717     Special Requests: NO SPECIAL REQUESTS        Culture result: NO GROWTH 3 DAYS       CULTURE, BLOOD [[id number]] Collected: 07/14/21 1539    Order Status: Completed Specimen: Blood Updated: 07/17/21 0717     Special Requests: NO SPECIAL REQUESTS        Culture result: NO GROWTH 3 DAYS       CULTURE, URINE [[id number]] Collected: 07/14/21 1840    Order Status: Completed Specimen: Cath Urine Updated: 07/16/21 1353     Special Requests: NO SPECIAL REQUESTS        Culture result: No growth (<1,000 CFU/ML)       COVID-19 RAPID TEST [[id number]] Collected: 07/14/21 1650    Order Status: Completed Specimen: Nasopharyngeal Updated: 07/14/21 1722     Specimen source NASAL        COVID-19 rapid test Not detected        Comment: Rapid Abbott ID Now       Rapid NAAT:  The specimen is NEGATIVE for SARS-CoV-2, the novel coronavirus associated with COVID-19. Negative results should be treated as presumptive and, if inconsistent with clinical signs and symptoms or necessary for patient management, should be tested with an alternative molecular assay. Negative results do not preclude SARS-CoV-2 infection and should not be used as the sole basis for patient management decisions. This test has been authorized by the FDA under an Emergency Use Authorization (EUA) for use by authorized laboratories.    Fact sheet for Healthcare Providers: ConventionUpdate.co.nz  Fact sheet for Patients: ConventionUpdate.co.nz       Methodology: Isothermal Nucleic Acid Amplification               Telemetry: normal sinus rhythm      Imaging:  [x]I have personally reviewed the patients chest radiographs images and report Results from Hospital Encounter encounter on 07/14/21    XR CHEST PORT    Narrative  EXAM: XR CHEST PORT    CLINICAL INDICATION/HISTORY: Multilobar pneumonia  -Additional: None    COMPARISON: 7/14/2021    TECHNIQUE: Portable frontal view of the chest    _______________    FINDINGS:    SUPPORT DEVICES: None. HEART AND MEDIASTINUM: Unremarkable. LUNGS AND PLEURAL SPACES: Aging findings are suggestive of new right lower lobe  collapse versus prominent developing pleural effusion. The collapse could be  related to a mucous plug or aspiration. Patchy airspace opacities within the  right lung have progressed since 7/14/2021. BONY THORAX AND SOFT TISSUES: Unremarkable.    _______________    Impression  Airspace opacities within the right lung have progressed since 7/14/2021. Areas  either right lower lobe collapse which could be related to aspiration or a  mucous plug, or a developing right pleural effusion. There is improvement in  aeration of the left lung compared to prior examination. Results from Hospital Encounter encounter on 07/14/21    CT CHEST ABD PELV W CONT    Narrative  EXAM: CT of the chest, abdomen, and pelvis    INDICATION: Sepsis pna, nausea and vomiting (requested by admitting team). COMPARISON: None. TECHNIQUE: Axial CT imaging of the chest, abdomen, and pelvis was performed with  intravenous contrast. Multiplanar reformats were generated. One or more dose  reduction techniques were used on this CT: automated exposure control,  adjustment of the mAs and/or kVp according to patient size, and iterative  reconstruction techniques. The specific techniques used on this CT exam have  been documented in the patient's electronic medical record.     Digital Imaging and Communications in Medicine (DICOM) format image data are  available to nonaffiliated external healthcare facilities or entities on a  secure, media free, reciprocally searchable basis with patient authorization for  at least a 12 month period after this study. _______________    FINDINGS:    CHEST:    LUNGS: No suspicious nodule or mass. There is multifocal bilateral  consolidation. This is more confluent within the right upper and lower lobes. PLEURA: Normal, with no effusion or pneumothorax. AIRWAY: Normal.    MEDIASTINUM: Normal heart size. No pericardial effusion. Given the limitations  of cardiac motion, great vessels are unremarkable. LYMPH NODES: No enlarged lymph nodes.    ===============    ABDOMEN/PELVIS:    LIVER, BILIARY: Liver attenuation is decreased suggestive of steatosis. No  biliary dilation. Gallbladder is unremarkable. PANCREAS: Normal.    SPLEEN: Normal.    ADRENALS: Normal.    KIDNEYS: Normal.    LYMPH NODES: No enlarged lymph nodes. GASTROINTESTINAL TRACT: No bowel dilation or wall thickening. PELVIC ORGANS: Fibroid uterus. VASCULATURE: Unremarkable. BONES: No acute or aggressive osseous abnormalities identified. OTHER: None.    _______________    Impression  1. Multilobar pneumonia    2. No significant findings within the abdomen or pelvis    3.  Fibroid uterus      CT Head 7/14/21  IMPRESSION   No acute intracranial findings        [x]See my orders for details          Mata Gann MD

## 2021-07-18 NOTE — PROGRESS NOTES
[facility]  Pulmonary, Critical Care, and Sleep Medicine    Name: Mohini Valente MRN: [id number]   : 1960 Hospital: [facility] [place]   Date: 2021        Pulmonary Critical Care Note    IMPRESSION:   · Acute hypoxic respiratory failure · J96.01 ·   Aspiration pneumonia · J69.0 ·   Sepsis with organ dysfunction · A41.9, R65.20 ·   Leukocytosis · D72.829 ·   Elevated troponin · R77.8 ·   Lactic acidosis, resolved · E87.2 ·   Seizure d/o ·  ·   Multiple sclerosis with spastic paralysis ·      Patient Active Problem List   Diagnosis Code    Sepsis ([address] A41.9    MS (multiple sclerosis) ([facility] [address] G35    Seizure ([address] R56.9    Pressure injury of sacral region, stage 4 ([address] L89.154    Acute respiratory failure with hypoxia ([address] J96.01    Elevated troponin R77.8    Lactic acidosis E87.2    Bedridden Z74.01    Pneumonia J18.9    Cachexia (HCC) R64    Severe protein-calorie malnutrition (HCC) E43    Hypokalemia E87.6    Encounter for palliative care Z51.5    Debility R53.81      RECOMMENDATIONS:   Respiratory: Supplemental O2 via NC @ 6 Lpm, titrate flow, fio2 for goal SPO2> 91%  X-ray chest for follow-up in a.m. - possible atelectasis from mucus plug vs more favoring pl effusion   CT chest 21 - Multilobar pneumonia likely from aspiration pneumonia  Aspiration prevention bundle followed, head of the bed at 30' all times  Bronchodilators as needed, pulmonary hygiene care    ID: Sepsis bundle per hospital protocol  Antibiotic choice: cefepime, Vancomycin finish 10 days of course; PCN allergy - tolerating cefepime, dose rounded for weight and renal function with help of clinical pharmacist  Blood Cultures 2021NGTD  Urine culture 2021 negative  No wound culture available so will finish course with Vancomycin give persistent leukocytosis  CT chest 2021 with multilobar pneumonia likely from aspiration contributing to sepsis  CT abdomen, pelvis 2021- nil acute  Role of decub ulcer infection likely - wound care consulted    CVS: Hemodynamics - stable  Sinus tachycardia on EKG 7/14/21 likely from sepsis -intermittent resolution of ST  Continue metoprolol 5 mg every 6 hours for heart rate control as tolerated for blood pressure  On clonidine patch for hypertension  Stop IV fluid D5 1/2 NS at 30 mL/hr given concern for pulmonary edema on x-ray chest 7/18/21  Cardiology followed - elevated troponin demand related per cardiology  Lovenox to 30 mg daily for DVT prophylaxis  ECHO 7/15/21  · LV: Estimated LVEF is 55 - 60%. Normal cavity size, wall thickness and systolic function (ejection fraction normal). Mild (grade 1) left ventricular diastolic dysfunction E/E' ratio = 8.02.  · RV: Borderline low systolic function. · Tricuspid regurgitation is inadequate for estimation of right ventricular systolic pressure    Renal: Monitor renal functions, lytes, urine output  Replace lytes per unit protocol and recheck as needed    Heme: Monitor CBC - stable hgb, Plt  No evidence of active bleeding  Endo: Glycemic control as needed  GI: diet - NPO; if continues to show signs of improvement then may consider swallow evaluation when patient is close to her baseline  Neurology: non-verbal, nodding head during conversation, moving upper extremity over face fixing cannula and other monitor  Keppra 1000 mg Q12 per neurology. No further seizure disorder  CT head 7/14/21 - nil acute; unable to do MRI secondary to baclofen pump  EEG 7/15/21 - abnormal due to diffuse generalized slowing, which is an indicator of diffuse cerebral dysfunction from any cause including -but not limited to- toxic, metabolic and infectious etiologies. There are left hemisphere intermittent sharp waves in left frontotemporal regions, which is consistent with history of epilepsy. No active seizure on EEG. Monitor in ICU. Overall poor prognosis.  Family is aware and discussing end-of-life care through help of palliative care. As per last discussions between palliative care and family to monitor patient over the weekend and further determination for hospice/comfort care probably early next week as per clinical course and per family    Will defer respective systems problem management to primary and other consultant and follow patient in ICU with primary and other medical team  Further recommendations will be based on the patient's response to recommended treatment and results of the investigation ordered. Quality Care: PPI, DVT prophylaxis, HOB elevated, Infection control all reviewed and addressed. PAIN AND SEDATION: judicious opiate; avoid sedative if possible  Skin/Wound: skin breakdowns LT hip, sacrum  Prophylaxis: DVT and GI Prophylaxis reviewed. Restraints: none   PT/OT eval and treat: as needed when stable   Lines/Tubes: PIV     ADVANCE DIRECTIVE: DNR. Palliative care consulted  DISCUSSION: Events and notes from last 24 hours reviewed. Care plan discussed with nursing, hospitalist, RT  D/w family-  at the bedside on 7/17/2021 (answered all questions to satisfaction). Quality Care: PPI, DVT prophylaxis, HOB elevated, Infection control all reviewed and addressed. High complexity decision making was performed during the evaluation of this patient at high risk for decompensation with multiple organ involvement. Total critical care time spent rendering complex decision making and > 50% time spent in face to face evaluation exclusive of procedures/family discussion/coordination of care: 36 minutes. Subjective/History:   Ms. Patt Shahid has been seen and evaluated as Dr. lCary Williamson requested now for assisting in ICU care for Acute hypoxic respiratory failure, aspiration pneumonia with sepsis with organ dysfunction.   Patient is a 64 y.o. female with PMHx for MS, seizure d/o, decub ulcers presented from home via EMS for progressive change in mentation and lethargy, poor appetite, increased work of breathing, fever, vomiting, episode of aspiration. The patient is critically ill and can not provide additional history due to unable to comprehend. Information limited and provided by . Pt is bed bound at home from MS; feeds orally, able to smile back and may say \"hi\" back once in while otherwise non-verbal. In Er, pt noted to be hypoxic requiring NRBM for increased work of breathing and hypoxia. XR chest showed bibasilar infiltrates. Labs showed leukocytosis, elevated lactic acid, troponin, pro-BNP. Pt is admitted to ICU for close monitoring. She is DNR/DNI per ER provider and . Other information is limited. 07/18/21  Patient seen at bedside in ICU room #105  Remains on O2 via nasal cannula at 6 L/min and tolerating it  Off and on sinus tachycardia while remain hemodynamically stable  No arrhythmia or seizure-like activities  Appears awake, alert, nodding head during conversation, moving upper extremities over face to fix things, does not follow command  Low-grade fever  N.p.o., no nausea no vomiting episode  No evidence for bleeding anywhere  I was not contacted by staff on anything about patient overnight. Review of Systems:  Review of systems not obtained due to patient factors. Past Medical History:  Past Medical History:   Diagnosis Date    MS (multiple sclerosis) ([facility] [address]         Past Surgical History:  No past surgical history on file. Medications:  Prior to Admission medications    Medication Sig Start Date End Date Taking? Authorizing Provider   baclofen (LIORESAL) 10 mg tablet Take  by mouth three (3) times daily. Yes Provider, Historical   metoprolol succinate (TOPROL-XL) 25 mg XL tablet Take 25 mg by mouth daily. Provider, Historical   folic acid (FOLVITE) 1 mg tablet Take 1 mg by mouth daily. Provider, Historical   predniSONE (DELTASONE) 10 mg tablet Take 5 mg by mouth daily.     Provider, Historical   cholecalciferol, vitamin D3, (Vitamin D3) 50 mcg (2,000 unit) tab Take 2,000 Units by mouth daily. Provider, Historical   clonazePAM (KlonoPIN) 0.5 mg tablet Take 0.25 mg by mouth two (2) times a day. Provider, Historical   baclofen (LIORESAL) 20 mg tablet Take 20 mg by mouth two (2) times a day. Provider, Historical   levETIRAcetam (Keppra) 500 mg tablet Take 500 mg by mouth two (2) times a day. 2 tabs in the am; 1 tab at night    Provider, Historical       Current Facility-Administered Medications   Medication Dose Route Frequency    vancomycin (VANCOCIN) 500 mg in 0.9% sodium chloride (MBP/ADV) 100 mL MBP  500 mg IntraVENous Q12H    cloNIDine (CATAPRES) 0.1 mg/24 hr patch 1 Patch  1 Patch TransDERmal Q7D    baclofen intrathecal soln (Patient Supplied)  6.25 mcg/hr Intrathecal CONTINUOUS    metroNIDAZOLE (FLAGYL) IVPB premix 500 mg  500 mg IntraVENous Q8H    enoxaparin (LOVENOX) injection 30 mg  30 mg SubCUTAneous Q24H    insulin lispro (HUMALOG) injection   SubCUTAneous Q6H    metoprolol (LOPRESSOR) injection 5 mg  5 mg IntraVENous Q6H    sodium chloride (NS) flush 5-40 mL  5-40 mL IntraVENous Q8H    levETIRAcetam (KEPPRA) 1,000 mg in 0.9% sodium chloride 100 mL IVPB  1,000 mg IntraVENous Q12H    cefepime (MAXIPIME) 2 g in sterile water (preservative free) 10 mL IV syringe  2 g IntraVENous Q12H    pantoprazole (PROTONIX) 40 mg in 0.9% sodium chloride 10 mL injection  40 mg IntraVENous DAILY    Vancomycin - Pharmacy to dose   1 Each Other Rx Dosing/Monitoring       Allergy:  Allergies   Allergen Reactions    Pcn [Penicillins] Unknown (comments)        Social History:  Social History     Tobacco Use    Smoking status: Not on file   Substance Use Topics    Alcohol use: Not on file    Drug use: Not on file        Family History:  No family history on file. Objective:   Vital Signs:    Blood pressure (!) 147/66, pulse (!) 115, temperature 99.1 °F (37.3 °C), resp. rate 20, height 5' 3\" (1.6 m), weight 38.3 kg (84 lb 7 oz), SpO2 94 %.  Body mass index is 14.96 kg/m². O2 Device: Nasal cannula   O2 Flow Rate (L/min): 6 l/min   Temp (24hrs), Av.3 °F (37.4 °C), Min:98 °F (36.7 °C), Max:100.8 °F (38.2 °C)         Intake/Output:   Last shift:      No intake/output data recorded.   Last 3 shifts:  1901 -  0700  In: 1899.2 [I.V.:1899.2]  Out:  [Urine:]    Intake/Output Summary (Last 24 hours) at 2021 1104  Last data filed at 2021 0400  Gross per 24 hour   Intake 1899.22 ml   Output 1400 ml   Net 499.22 ml       Physical Exam:   General: cachectic, chronically ill-appearing, awake, alert, nodding during conversation, not following simple commands, cooperative, on O2 via nasal cannula  HEENT: PERRL, fundi benign, oral mucosa dry  Neck: No abnormally enlarged lymph nodes or thyroid, supple  Chest: normal  Lungs: normal air entry, rhonchi scattered bilaterally, normal percussion bilaterally, no tenderness/ rash  Heart: Regular rate and rhythm, S1S2 present or without murmur or extra heart sounds  Abdomen: non distended, bowel sounds normoactive, tympanic, abdomen is soft without significant tenderness, masses, organomegaly or guarding, rigidity, rebound  Extremity: negative for edema, cyanosis, clubbing; contracted extremities upper and lower bilaterally  Neuro: Awake, alert, nodding during conversation, not following commands, no involuntary movements, exam limitation  Skin: Skin color, texture, turgor fair      Data:     Recent Results (from the past 24 hour(s))   GLUCOSE, POC    Collection Time: 21 12:02 PM   Result Value Ref Range    Glucose (POC) 117 (H) 70 - 110 mg/dL   VANCOMYCIN, TROUGH    Collection Time: 21  4:50 PM   Result Value Ref Range    Vancomycin,trough 1.1 (L) 10.0 - 20.0 ug/mL    Reported dose date 2021      Reported dose time:       Reported dose: 500 MG UNITS   GLUCOSE, POC    Collection Time: 21  5:41 PM   Result Value Ref Range    Glucose (POC) 116 (H) 70 - 110 mg/dL   POTASSIUM    Collection Time: 07/17/21  5:45 PM   Result Value Ref Range    Potassium 3.6 3.5 - 5.5 mmol/L   PHOSPHORUS    Collection Time: 07/17/21  5:45 PM   Result Value Ref Range    Phosphorus 1.5 (L) 2.5 - 4.9 MG/DL   VANCOMYCIN, RANDOM    Collection Time: 07/17/21  5:45 PM   Result Value Ref Range    Vancomycin, random 1.3 (L) 5.0 - 40.0 UG/ML   GLUCOSE, POC    Collection Time: 07/17/21 11:35 PM   Result Value Ref Range    Glucose (POC) 114 (H) 70 - 110 mg/dL   CBC WITH AUTOMATED DIFF    Collection Time: 07/18/21  5:05 AM   Result Value Ref Range    WBC 18.9 (H) 4.6 - 13.2 K/uL    RBC 3.71 (L) 4.20 - 5.30 M/uL    HGB 10.1 (L) 12.0 - 16.0 g/dL    HCT 33.4 (L) 35.0 - 45.0 %    MCV 90.0 74.0 - 97.0 FL    MCH 27.2 24.0 - 34.0 PG    MCHC 30.2 (L) 31.0 - 37.0 g/dL    RDW 16.7 (H) 11.6 - 14.5 %    PLATELET 057 739 - 054 K/uL    MPV 10.2 9.2 - 11.8 FL    NEUTROPHILS 84 (H) 40 - 73 %    BAND NEUTROPHILS 7 (H) 0 - 5 %    LYMPHOCYTES 3 (L) 21 - 52 %    MONOCYTES 3 3 - 10 %    EOSINOPHILS 3 0 - 5 %    BASOPHILS 0 0 - 2 %    ABS. NEUTROPHILS 17.1 (H) 1.8 - 8.0 K/UL    ABS. LYMPHOCYTES 0.6 (L) 0.9 - 3.6 K/UL    ABS. MONOCYTES 0.6 0.05 - 1.2 K/UL    ABS. EOSINOPHILS 0.6 (H) 0.0 - 0.4 K/UL    ABS.  BASOPHILS 0.0 0.0 - 0.1 K/UL    DF MANUAL      PLATELET COMMENTS LARGE PLATELETS      RBC COMMENTS POLYCHROMASIA  1+       MAGNESIUM    Collection Time: 07/18/21  5:05 AM   Result Value Ref Range    Magnesium 2.0 1.6 - 2.6 mg/dL   PHOSPHORUS    Collection Time: 07/18/21  5:05 AM   Result Value Ref Range    Phosphorus 2.8 2.5 - 4.9 MG/DL   METABOLIC PANEL, BASIC    Collection Time: 07/18/21  5:05 AM   Result Value Ref Range    Sodium 140 136 - 145 mmol/L    Potassium 4.7 3.5 - 5.5 mmol/L    Chloride 108 100 - 111 mmol/L    CO2 23 21 - 32 mmol/L    Anion gap 9 3.0 - 18 mmol/L    Glucose 118 (H) 74 - 99 mg/dL    BUN 8 7.0 - 18 MG/DL    Creatinine 0.27 (L) 0.6 - 1.3 MG/DL    BUN/Creatinine ratio 30 (H) 12 - 20      GFR est AA >60 >60 ml/min/1.73m2    GFR est non-AA >60 >60 ml/min/1.73m2    Calcium 7.9 (L) 8.5 - 10.1 MG/DL   GLUCOSE, POC    Collection Time: 07/18/21  5:39 AM   Result Value Ref Range    Glucose (POC) 109 70 - 110 mg/dL           No results for input(s): FIO2I, IFO2, HCO3I, IHCO3, HCOPOC, PCO2I, PCOPOC, IPHI, PHI, PHPOC, PO2I, PO2POC in the last 72 hours. No lab exists for component: IPOC2    All Micro Results     Procedure Component Value Units Date/Time    CULTURE, BLOOD [[id number]] Collected: 07/14/21 1545    Order Status: Completed Specimen: Blood Updated: 07/18/21 0740     Special Requests: NO SPECIAL REQUESTS        Culture result: NO GROWTH 4 DAYS       CULTURE, BLOOD [[id number]] Collected: 07/14/21 1539    Order Status: Completed Specimen: Blood Updated: 07/18/21 0740     Special Requests: NO SPECIAL REQUESTS        Culture result: NO GROWTH 4 DAYS       CULTURE, URINE [[id number]] Collected: 07/14/21 1840    Order Status: Completed Specimen: Cath Urine Updated: 07/16/21 1353     Special Requests: NO SPECIAL REQUESTS        Culture result: No growth (<1,000 CFU/ML)       COVID-19 RAPID TEST [[id number]] Collected: 07/14/21 1650    Order Status: Completed Specimen: Nasopharyngeal Updated: 07/14/21 1722     Specimen source NASAL        COVID-19 rapid test Not detected        Comment: Rapid Abbott ID Now       Rapid NAAT:  The specimen is NEGATIVE for SARS-CoV-2, the novel coronavirus associated with COVID-19. Negative results should be treated as presumptive and, if inconsistent with clinical signs and symptoms or necessary for patient management, should be tested with an alternative molecular assay. Negative results do not preclude SARS-CoV-2 infection and should not be used as the sole basis for patient management decisions. This test has been authorized by the FDA under an Emergency Use Authorization (EUA) for use by authorized laboratories.    Fact sheet for Healthcare Providers: ConventionUpdate.co.nz  Fact sheet for Patients: [facility].co.nz       Methodology: Isothermal Nucleic Acid Amplification               Telemetry: normal sinus rhythm      Imaging:  [x]I have personally reviewed the patients chest radiographs images and report   Most recent  Results from Hospital Encounter encounter on 07/14/21    XR CHEST PORT    Narrative  EXAM: XR CHEST PORT    CLINICAL INDICATION/HISTORY: Aspiration pneumonia  -Additional: None    COMPARISON: 7/17/2021    TECHNIQUE: Portable frontal view of the chest    _______________    FINDINGS:    SUPPORT DEVICES: None. HEART AND MEDIASTINUM: Unremarkable. LUNGS AND PLEURAL SPACES: Persistent collapse of the right lower lobe which  could be related to aspiration or mucous plug is again noted, not significantly  changed since prior examination. There is mild interval increase in pulmonary  vascular congestion in the central aspect of the left lung compared to  7/17/2021. BONY THORAX AND SOFT TISSUES: Unremarkable.    _______________    Impression  Persistent collapse of the right lower lung is again noted. There is increasing  pulmonary vascular congestion within the left lung compared to 7/17/2021. Results from Hospital Encounter encounter on 07/14/21    CT CHEST ABD PELV W CONT    Narrative  EXAM: CT of the chest, abdomen, and pelvis    INDICATION: Sepsis pna, nausea and vomiting (requested by admitting team). COMPARISON: None. TECHNIQUE: Axial CT imaging of the chest, abdomen, and pelvis was performed with  intravenous contrast. Multiplanar reformats were generated. One or more dose  reduction techniques were used on this CT: automated exposure control,  adjustment of the mAs and/or kVp according to patient size, and iterative  reconstruction techniques. The specific techniques used on this CT exam have  been documented in the patient's electronic medical record.     Digital Imaging and Communications in Medicine (DICOM) format image data are  available to nonaffiliated external healthcare facilities or entities on a  secure, media free, reciprocally searchable basis with patient authorization for  at least a 12 month period after this study. _______________    FINDINGS:    CHEST:    LUNGS: No suspicious nodule or mass. There is multifocal bilateral  consolidation. This is more confluent within the right upper and lower lobes. PLEURA: Normal, with no effusion or pneumothorax. AIRWAY: Normal.    MEDIASTINUM: Normal heart size. No pericardial effusion. Given the limitations  of cardiac motion, great vessels are unremarkable. LYMPH NODES: No enlarged lymph nodes.    ===============    ABDOMEN/PELVIS:    LIVER, BILIARY: Liver attenuation is decreased suggestive of steatosis. No  biliary dilation. Gallbladder is unremarkable. PANCREAS: Normal.    SPLEEN: Normal.    ADRENALS: Normal.    KIDNEYS: Normal.    LYMPH NODES: No enlarged lymph nodes. GASTROINTESTINAL TRACT: No bowel dilation or wall thickening. PELVIC ORGANS: Fibroid uterus. VASCULATURE: Unremarkable. BONES: No acute or aggressive osseous abnormalities identified. OTHER: None.    _______________    Impression  1. Multilobar pneumonia    2. No significant findings within the abdomen or pelvis    3.  Fibroid uterus      CT Head 7/14/21  IMPRESSION   No acute intracranial findings        [x]See my orders for details          Tomy Barfield MD

## 2021-07-18 NOTE — PROGRESS NOTES
Pharmacy Dosing - Metronidazole    Jamal Junior is a 64 y.o. yo female prescribed metronidazole for Empiric Therapy    Ordered Dose: 500 mg q8h    Dose adjusted to 500 mg IV every 12 hours per P&T protocol     Every 12 hours dosing NOT indicated for C.  Difficile, CNS or non-anaerobic infections       Jimbo Ugarte, [place]  Contact Information: [phone]

## 2021-07-18 NOTE — PROGRESS NOTES
0800 Assumed care of patient. . Pt in bed resting comfortably. . will continue to monitor. . HR 91. ...RR 15 /60 . [place] Sats 95%. .. will continue to monitor. .. audible rhonchi in upper airway with fine crackles in lower. ..    1200 Reassessment completed /83 . [place] . .. metoprolol given. ... 1330 /82 . ..  hydralazine given at 1338. .. Spouse at the side of the bed 1405. [place] .. .with visitor present. .pt appears to be gasping. ...RR 33 with fine crackles in upper airway. ... SpO2 87. [place] Ativan given. .. [place] Transitioned to nonrebreather. .... /68. .. Temp 99.1... [place] . discussed with MD advised to complete EKG and give 10 mg IV cardizem once. .. . placed patient on ice. . RT transitioned to HFNC   1459 Patient HR still 169. [place] Temp 99.6. [place] black KNOWLES received order for cardizem drip. .. will continue to monitor  1600 Reassessment completed. . will continue to monitor

## 2021-07-18 NOTE — PROGRESS NOTES
Hospitalist Progress Note    Patient: Haresh Stein MRN: [id number]  CSN: [id number]    YOB: 1960  Age: 64 y.o. Sex: female    DOA: 7/14/2021 LOS:  LOS: 4 days            Assessment/Plan   1. Acute hypoxemic respiratory failure- oxygen requirements improved from admission  2. Aspiration pneumonia  3. Decubitus ulceration  4. Elevated troponin- trending downards  5. MS  6. Seizure disorder  7. Hypokalemia- resolved     Plan:  - continue broad spectrum antibiotics  - IV fluids  - supportive care. Family considering comfort measures    Patient Active Problem List   Diagnosis Code    Sepsis ([address] A41.9    MS (multiple sclerosis) ([facility] G35    Seizure ([address] R56.9    Pressure injury of sacral region, stage 4 ([address] L89.154    Acute respiratory failure with hypoxia ([address] J96.01    Elevated troponin R77.8    Lactic acidosis E87.2    Bedridden Z74.01    Pneumonia J18.9    Cachexia ([address] R64    Severe protein-calorie malnutrition ([address] E43    Hypokalemia E87.6    Encounter for palliative care Z51.5    Debility R53.81               Subjective:    cc: asp pneumonia  No acute events overnight      REVIEW OF SYSTEMS:  General: No fevers or chills. Cardiovascular: No chest pain or pressure. No palpitations. Pulmonary: No shortness of breath. Gastrointestinal: No nausea, vomiting. Objective:        Vital signs/Intake and Output:  Visit Vitals  BP (!) 149/70   Pulse (!) 101   Temp 99.1 °F (37.3 °C)   Resp 20   Ht 5' 3\" (1.6 m)   Wt 38.3 kg (84 lb 7 oz)   SpO2 97%   BMI 14.96 kg/m²     Current Shift:  No intake/output data recorded. Last three shifts:  07/16 1901 - 07/18 0700  In: 1899.2 [I.V.:1899.2]  Out: 2000 [Urine:2000]    Body mass index is 14.96 kg/m².     Physical Exam:  GEN: Alert and oriented times three NAD, chroincally ill appearing  EYES: conjunctiva normal, lids with out lesions  HEENT: MMM, No thyromegaly, no lymphadenopathy  HEART: RRR +S1 +S2, no JVD, pulses 2+ distally  LUNGS: CTA B/L no wheezes, rales or rhonchi  ABDOMEN: + BS, soft NT/ND no organomegaly,  No rebound  EXTREMITIES: No edema cyanosis, cap refill normal   SKIN: no rashes or skin breakdown, no nodules, normal turgor  Current Facility-Administered Medications   Medication Dose Route Frequency    vancomycin (VANCOCIN) 500 mg in 0.9% sodium chloride (MBP/ADV) 100 mL MBP  500 mg IntraVENous Q12H    cloNIDine (CATAPRES) 0.1 mg/24 hr patch 1 Patch  1 Patch TransDERmal Q7D    hydrALAZINE (APRESOLINE) 20 mg/mL injection 10 mg  10 mg IntraVENous Q6H PRN    baclofen intrathecal soln (Patient Supplied)  6.25 mcg/hr Intrathecal CONTINUOUS    metroNIDAZOLE (FLAGYL) IVPB premix 500 mg  500 mg IntraVENous Q8H    dextrose 5 % - 0.45% NaCl infusion  30 mL/hr IntraVENous CONTINUOUS    enoxaparin (LOVENOX) injection 30 mg  30 mg SubCUTAneous Q24H    glucose chewable tablet 16 g  4 Tablet Oral PRN    glucagon (GLUCAGEN) injection 1 mg  1 mg IntraMUSCular PRN    dextrose (D50W) injection syrg 12.5-25 g  25-50 mL IntraVENous PRN    morphine injection 1 mg  1 mg IntraVENous Q4H PRN    insulin lispro (HUMALOG) injection   SubCUTAneous Q6H    metoprolol (LOPRESSOR) injection 5 mg  5 mg IntraVENous Q6H    sodium chloride (NS) flush 5-40 mL  5-40 mL IntraVENous Q8H    sodium chloride (NS) flush 5-40 mL  5-40 mL IntraVENous PRN    acetaminophen (TYLENOL) tablet 650 mg  650 mg Oral Q6H PRN    Or    acetaminophen (TYLENOL) suppository 650 mg  650 mg Rectal Q6H PRN    polyethylene glycol (MIRALAX) packet 17 g  17 g Oral DAILY PRN    ondansetron (ZOFRAN ODT) tablet 4 mg  4 mg Oral Q8H PRN    Or    ondansetron (ZOFRAN) injection 4 mg  4 mg IntraVENous Q6H PRN    ELECTROLYTE REPLACEMENT PROTOCOL - Potassium Standard Dosing   1 Each Other PRN    ELECTROLYTE REPLACEMENT PROTOCOL - Magnesium   1 Each Other PRN    ELECTROLYTE REPLACEMENT PROTOCOL - Phosphorus  Standard Dosing  1 Each Other PRN    ELECTROLYTE REPLACEMENT PROTOCOL - Calcium   1 Each Other PRN    levETIRAcetam (KEPPRA) 1,000 mg in 0.9% sodium chloride 100 mL IVPB  1,000 mg IntraVENous Q12H    cefepime (MAXIPIME) 2 g in sterile water (preservative free) 10 mL IV syringe  2 g IntraVENous Q12H    LORazepam (ATIVAN) injection 1 mg  1 mg IntraVENous Q2H PRN    pantoprazole (PROTONIX) 40 mg in 0.9% sodium chloride 10 mL injection  40 mg IntraVENous DAILY    albuterol-ipratropium (DUO-NEB) 2.5 MG-0.5 MG/3 ML  3 mL Nebulization Q4H PRN    Vancomycin - Pharmacy to dose   1 Each Other Rx Dosing/Monitoring         All the patient's labs over the past 24 hours were reviewed both during my initial daily workflow process and at the time notated as \"note time\" in [address] [facility]. (It is not time stamped separately in this workflow.)  Select labs are listed below. Labs: Results:       Chemistry Recent Labs     07/18/21  0505 07/17/21  1745 07/17/21  0617 07/16/21  1942 07/16/21  0450   *  --  99  --  176*     --  137  --  133*   K 4.7 3.6 3.4*   < > 3.5     --  104  --  99*   CO2 23  --  23  --  24   BUN 8  --  8  --  7   CREA 0.27*  --  0.23*  --  0.24*   CA 7.9*  --  8.2*  --  8.2*   AGAP 9  --  10  --  10   BUCR 30*  --  35*  --  29*    < > = values in this interval not displayed.       CBC w/Diff Recent Labs     07/18/21  0505 07/17/21  0617 07/16/21  0450   WBC 18.9* 24.8* 22.2*   RBC 3.71* 3.73* 3.31*   HGB 10.1* 10.4* 9.4*   HCT 33.4* 33.6* 29.4*    364 401   GRANS 84* 91* 86*   LYMPH 3* 2* 2*   EOS 3 0 0                              Procedures/imaging: see electronic medical records for all procedures/Xrays and details which were not copied into this note but were reviewed prior to creation of Plan    Imaging personally reviewed:              Edgard Moise DO  Internal Medicine/Geriatrics

## 2021-07-18 NOTE — PROGRESS NOTES
1900: Bedside and Verbal shift change report given to Moraima Wang RN (oncoming nurse) by Lit Tom RN (offgoing nurse). Report included the following information SBAR, Kardex, Intake/Output, MAR, Recent Results, Med Rec Status, Cardiac Rhythm Sinus Tach/NSR and Alarm Parameters . 2000: Shift assessment completed. Pt responding to name and nodding appropriately. Pt tolerating 6LNC with SPO2 % at this time. 0000: Reassessment completed. 0400: Reassessment completed. 0710: Bedside and Verbal shift change report given to VALERIANO Arita RN (oncoming nurse) by Mikey Kirkpatrick RN (offgoing nurse). Report included the following information SBAR, Kardex, Intake/Output, MAR, Recent Results, Med Rec Status, Cardiac Rhythm Sinus Tach/NSR and Alarm Parameters .

## 2021-07-18 NOTE — PROGRESS NOTES
1915: Received report from Deanna Le RN.     3403: Assessment completed. Patient is nonverbal and does not follow commands. Patient is on High Flow NC. Patient remains on Cardizem drip, HR 130s. Hall catheter in place. Patient no longer has fluids running -- talked to Dr. Anthony Xavier, orders received for one time dose of Albumin 25%. 2345: Patient is more alert -- able to follow small commands and nods her head. HR is in the 90s. Mouth care completed. 0400: Reassessment with no changes. 0725: Bedside shift change report given to MUKUL Hedrick RN (oncoming nurse) by Zoila GARZA RN (offgoing nurse). Report included the following information SBAR, Kardex and MAR.

## 2021-07-19 NOTE — PROGRESS NOTES
Pharmacy Dosing Services: Vancomycin   SCr = 0.17  CrCl ~ 49.3 ml/min   WBC = 20  Temp = 98  Indication: CAP x 7 days per consult   DOT: 6 of 7  Weight = 37 kg (updated in [facility] today)   Vancomycin trough = 9 ( goal 15 -20 )  Will increase Vancomycin to 750 mg IV q12h to start tonight at 2200  Pharmacy to continue to follow and make changes to dose and/or frequency based on clinical status.   Goodman Austin [facility] [phone]

## 2021-07-19 NOTE — PROGRESS NOTES
Pharmacy Dosing Services: Vancomycin   SCr = 0.17  CrCl ~ 49.3 ml/min   WBC = 20  Temp = 98  Indication: CAP x 7 days per consult   Weight = 37 kg   Vancomycin trough = 9   Pt continues on Vancomycin 500 mg IV q12h   Pharmacy to continue to follow and make changes to dose and/or frequency based on clinical status.   Kizzy Del Toro [facility] [phone]

## 2021-07-19 NOTE — PROGRESS NOTES
[facility]  Pulmonary, Critical Care, and Sleep Medicine    Name: Jessica Vo MRN: [id number]   : 1960 Hospital: [facility] [place]   Date: 2021        Pulmonary Critical Care Note    IMPRESSION:   · Acute hypoxic respiratory failure · J96.01 ·   Aspiration pneumonia · J69.0 ·   Sepsis with organ dysfunction · A41.9, R65.20 ·   Leukocytosis · D72.829 ·   Elevated troponin · R77.8 ·   Lactic acidosis, resolved · E87.2 ·   Seizure d/o ·  ·   Multiple sclerosis with spastic paralysis ·      Patient Active Problem List   Diagnosis Code    Sepsis ([address] A41.9    MS (multiple sclerosis) ([facility] [address] G35    Seizure ([address] R56.9    Pressure injury of sacral region, stage 4 ([address] L89.154    Acute respiratory failure with hypoxia ([address] J96.01    Elevated troponin R77.8    Lactic acidosis E87.2    Bedridden Z74.01    Pneumonia J18.9    Cachexia (HCC) R64    Severe protein-calorie malnutrition (HCC) E43    Hypokalemia E87.6    Encounter for palliative care Z51.5    Debility R53.81      RECOMMENDATIONS:   Respiratory: Patient with aspiration pneumonia and multiple sclerosis; on high flow nasal cannula oxygen. Chest x-ray shows right basal consolidations; recent chest CT reviewedshows evidence of bibasal aspiration pneumonia, worse in the right lower lobe. Continue high flow oxygen; keep oxygen saturations greater than 91%. Aspiration precautions; keep head end elevated; nebulizers as needed. ID: Continue antibiotics for aspiration pneumonia; patient on combination of cefepime and IV vancomycin. Patient on metronidazole per hospitalist team.  Cultures negative so far. CVS: Blood pressure stable; continue Cardizem drip for SVT. Metoprolol 5 mg scheduled IV every 6 hours. Cardiology on case. ECHO 7/15/21  · LV: Estimated LVEF is 55 - 60%. Normal cavity size, wall thickness and systolic function (ejection fraction normal).  Mild (grade 1) left ventricular diastolic dysfunction E/E' There is a delay in the start time of the procedure. Patient and family informed of delay by Faizan Boo. ratio = 8.02.  · RV: Borderline low systolic function. · Tricuspid regurgitation is inadequate for estimation of right ventricular systolic pressure  Renal: Patient has polyuria; no Lasix given last night; due to risk of dehydration, and poor oral intakestarted IV fluids. Replace K-Phos; discussed with pharmacy team.    Heme: Monitor hemoglobin and platelets; no active bleeding issues. Endo: Monitor blood sugars for hypoglycemia. GI: Poor oral intake; high risk for aspiration and poor mentation. Neurology: Advanced MS; CT head nil acute on admission. Patient on baclofen pump for MS related complications. Patient on Keppra per neurology for seizures. CT head 7/14/21 - nil acute; unable to do MRI secondary to baclofen pump. EEG 7/15/21 -no seizures; diffuse slowing consistent with encephalopathy. Prophylaxis: Lovenox and pantoprazole. Prognosis poor from advanced MS, with aspiration pneumonia and severe hypoxemia, on high flow oxygen supplemental therapy. CODE STATUSDNR/DNI. Palliative care on case; family meeting planned today. Discussed with son Broderick Boss outside patient's room; reviewed patient condition; reviewed plans for discussion regarding comfort care; DNR/DNI status reviewed based on prior discussion with familyfather and daughter; Broderick Boss has driven from [place]; also his birthday today. Recommend family meeting today with palliative care team, and review patient medical condition and goals of care; would need to consider end-of-life care if family agreeable tomorrow morning. Quality Care: PPI, DVT prophylaxis, HOB elevated, Infection control all reviewed and addressed. PAIN AND SEDATION: prn low-dose fentanyl for pain; avoid sedatives. Skin/Wound: skin breakdowns LT hip, sacrum  Prophylaxis: DVT and GI Prophylaxis reviewed. Restraints: none   Lines/Tubes: PIV     ADVANCE DIRECTIVE: DNR/DNI; palliative care team on case.     High complexity decision making was performed during the evaluation of this patient at high risk for decompensation with multiple organ involvement. Critical care time excludes discussion of procedures37 minutes. Subjective/History:   Ms. Rosalind Stratton has been seen and evaluated as Dr. Prosper Rucker requested now for assisting in ICU care for Acute hypoxic respiratory failure, aspiration pneumonia with sepsis with organ dysfunction. Patient is a 64 y.o. female with PMHx for MS, seizure d/o, decub ulcers presented from home via EMS for progressive change in mentation and lethargy, poor appetite, increased work of breathing, fever, vomiting, episode of aspiration. The patient is critically ill and can not provide additional history due to unable to comprehend. Information limited and provided by . Pt is bed bound at home from MS; feeds orally, able to smile back and may say \"hi\" back once in while otherwise non-verbal. In Er, pt noted to be hypoxic requiring NRBM for increased work of breathing and hypoxia. XR chest showed bibasilar infiltrates. Labs showed leukocytosis, elevated lactic acid, troponin, pro-BNP. Pt is admitted to ICU for close monitoring. She is DNR/DNI per ER provider and . Other information is limited. 07/19/21   Patient remains in ICU. Chart reviewed, discussed during signout. Patient remains on high flow nasal cannula oxygen. Advanced MS at baseline with bedbound status and impaired mentation. Patient on Cardizem drip; heart rate better controlled. Blood pressure stable. No reports of vomiting or diarrhea. Afebrile; mild sinus tachycardia; urine output2.1 L overnight. Review of Systems:  Limited due to patient condition       Past Medical History:  Past Medical History:   Diagnosis Date    MS (multiple sclerosis) ([facility] [address]         Past Surgical History:  No past surgical history on file. Medications:  Prior to Admission medications    Medication Sig Start Date End Date Taking?  Authorizing Provider   baclofen (LIORESAL) 10 mg tablet Take  by mouth three (3) times daily. Yes Provider, Historical   metoprolol succinate (TOPROL-XL) 25 mg XL tablet Take 25 mg by mouth daily. Provider, Historical   folic acid (FOLVITE) 1 mg tablet Take 1 mg by mouth daily. Provider, Historical   predniSONE (DELTASONE) 10 mg tablet Take 5 mg by mouth daily. Provider, Historical   cholecalciferol, vitamin D3, (Vitamin D3) 50 mcg (2,000 unit) tab Take 2,000 Units by mouth daily. Provider, Historical   clonazePAM (KlonoPIN) 0.5 mg tablet Take 0.25 mg by mouth two (2) times a day. Provider, Historical   baclofen (LIORESAL) 20 mg tablet Take 20 mg by mouth two (2) times a day. Provider, Historical   levETIRAcetam (Keppra) 500 mg tablet Take 500 mg by mouth two (2) times a day.  2 tabs in the am; 1 tab at night    Provider, Historical       Current Facility-Administered Medications   Medication Dose Route Frequency    potassium chloride 10 mEq in 100 ml IVPB  10 mEq IntraVENous Q1H    dilTIAZem (CARDIZEM) 125 mg in dextrose 5% 125 mL infusion  0-15 mg/hr IntraVENous TITRATE    metroNIDAZOLE (FLAGYL) IVPB premix 500 mg  500 mg IntraVENous Q12H    vancomycin (VANCOCIN) 500 mg in 0.9% sodium chloride (MBP/ADV) 100 mL MBP  500 mg IntraVENous Q12H    cloNIDine (CATAPRES) 0.1 mg/24 hr patch 1 Patch  1 Patch TransDERmal Q7D    baclofen intrathecal soln (Patient Supplied)  6.25 mcg/hr Intrathecal CONTINUOUS    enoxaparin (LOVENOX) injection 30 mg  30 mg SubCUTAneous Q24H    insulin lispro (HUMALOG) injection   SubCUTAneous Q6H    metoprolol (LOPRESSOR) injection 5 mg  5 mg IntraVENous Q6H    sodium chloride (NS) flush 5-40 mL  5-40 mL IntraVENous Q8H    levETIRAcetam (KEPPRA) 1,000 mg in 0.9% sodium chloride 100 mL IVPB  1,000 mg IntraVENous Q12H    cefepime (MAXIPIME) 2 g in sterile water (preservative free) 10 mL IV syringe  2 g IntraVENous Q12H    pantoprazole (PROTONIX) 40 mg in 0.9% sodium chloride 10 mL injection  40 mg IntraVENous DAILY    Vancomycin - Pharmacy to dose   1 Each Other Rx Dosing/Monitoring       Allergy:  Allergies   Allergen Reactions    Pcn [Penicillins] Unknown (comments)        Social History:  Social History     Tobacco Use    Smoking status: Not on file   Substance Use Topics    Alcohol use: Not on file    Drug use: Not on file        Family History:  No family history on file. Objective:   Vital Signs:    Blood pressure 113/66, pulse (!) 123, temperature 98 °F (36.7 °C), resp. rate 22, height 5' 3\" (1.6 m), weight 37 kg (81 lb 9.1 oz), SpO2 99 %. Body mass index is 14.45 kg/m².    O2 Device: Heated, Hi flow nasal cannula   O2 Flow Rate (L/min): 40 l/min   Temp (24hrs), Av.4 °F (36.9 °C), Min:98 °F (36.7 °C), Max:99.6 °F (37.6 °C)         Intake/Output:   Last shift:      701 -  190  In: 28.8 [I.V.:28.8]  Out: 300 [Urine:300]  Last 3 shifts: 1901 -  0700  In: 369.1 [I.V.:369.1]  Out: 9844 [Urine:3200]    Intake/Output Summary (Last 24 hours) at 2021 1223  Last data filed at 2021 0800  Gross per 24 hour   Intake 397.84 ml   Output 2400 ml   Net -2002.16 ml       Physical Exam:   Patient appears comfortable; on high flow nasal cannula oxygen40 L, 40% FiO2; acyanotic; appears cachectic and chronically ill  HEENT: pupils not dilated, no scleral jaundice, dry oral mucosa, no nasal drainage  Neck: No adenopathy or thyroid swelling  CVS: S1S2 no murmurs; JVD not elevated; telemetrymild sinus tachycardia  RS: Mod air entry bilaterally, decreased BS at bases, no wheezes, bibasal crackles, not tachypneic or in distress  Abd: soft, non tender, no hepatosplenomegaly, no abd distension, no guarding or rigidity, bowel sounds heard  Neuro: Arousable; trying to nod head weakly; limited exam  Extrm: no leg edema or swelling or clubbing; bilateral wasted and contracted upper extremities and lower extremities  Skin: no rash  Lymphatic: no cervical or supraclavicular adenopathy  Vasc: Poor DPs in feet      Data:     Recent Results (from the past 24 hour(s))   EKG, 12 LEAD, SUBSEQUENT    Collection Time: 07/18/21  2:14 PM   Result Value Ref Range    Ventricular Rate 185 BPM    Atrial Rate 185 BPM    QRS Duration 68 ms    Q-T Interval 270 ms    QTC Calculation (Bezet) 473 ms    Calculated R Axis -120 degrees    Calculated T Axis 64 degrees    Diagnosis       Supraventricular tachycardia  Right superior axis deviation  Pulmonary disease pattern  Right ventricular hypertrophy  Nonspecific ST and T wave abnormality  Abnormal ECG  When compared with ECG of 14-JUL-2021 15:29,  AK interval has decreased  QRS voltage has decreased  ST no longer depressed in Inferior leads     GLUCOSE, POC    Collection Time: 07/18/21  5:42 PM   Result Value Ref Range    Glucose (POC) 197 (H) 70 - 110 mg/dL   GLUCOSE, POC    Collection Time: 07/18/21 11:13 PM   Result Value Ref Range    Glucose (POC) 129 (H) 70 - 110 mg/dL   GLUCOSE, POC    Collection Time: 07/19/21  5:21 AM   Result Value Ref Range    Glucose (POC) 151 (H) 70 - 110 mg/dL   CBC WITH AUTOMATED DIFF    Collection Time: 07/19/21  5:42 AM   Result Value Ref Range    WBC 20.0 (H) 4.6 - 13.2 K/uL    RBC 2.83 (L) 4.20 - 5.30 M/uL    HGB 7.9 (L) 12.0 - 16.0 g/dL    HCT 25.0 (L) 35.0 - 45.0 %    MCV 88.3 74.0 - 97.0 FL    MCH 27.9 24.0 - 34.0 PG    MCHC 31.6 31.0 - 37.0 g/dL    RDW 16.9 (H) 11.6 - 14.5 %    PLATELET 001 427 - 609 K/uL    MPV 9.9 9.2 - 11.8 FL    NEUTROPHILS 88 (H) 40 - 73 %    BAND NEUTROPHILS 6 (H) 0 - 5 %    LYMPHOCYTES 4 (L) 21 - 52 %    MONOCYTES 0 (L) 3 - 10 %    EOSINOPHILS 2 0 - 5 %    BASOPHILS 0 0 - 2 %    ABS. NEUTROPHILS 18.8 (H) 1.8 - 8.0 K/UL    ABS. LYMPHOCYTES 0.8 (L) 0.9 - 3.6 K/UL    ABS. MONOCYTES 0.0 (L) 0.05 - 1.2 K/UL    ABS. EOSINOPHILS 0.4 0.0 - 0.4 K/UL    ABS.  BASOPHILS 0.0 0.0 - 0.1 K/UL    DF MANUAL      RBC COMMENTS ANISOCYTOSIS  1+        RBC COMMENTS HYPOCHROMIA  1+        WBC COMMENTS TOXIC GRANULATION     MAGNESIUM    Collection Time: 07/19/21  5:42 AM   Result Value Ref Range    Magnesium 1.9 1.6 - 2.6 mg/dL   PHOSPHORUS    Collection Time: 07/19/21  5:42 AM   Result Value Ref Range    Phosphorus 1.5 (L) 2.5 - 4.9 MG/DL   METABOLIC PANEL, BASIC    Collection Time: 07/19/21  5:42 AM   Result Value Ref Range    Sodium 140 136 - 145 mmol/L    Potassium 2.6 (LL) 3.5 - 5.5 mmol/L    Chloride 106 100 - 111 mmol/L    CO2 22 21 - 32 mmol/L    Anion gap 12 3.0 - 18 mmol/L    Glucose 150 (H) 74 - 99 mg/dL    BUN 6 (L) 7.0 - 18 MG/DL    Creatinine 0.17 (L) 0.6 - 1.3 MG/DL    BUN/Creatinine ratio 35 (H) 12 - 20      GFR est AA >60 >60 ml/min/1.73m2    GFR est non-AA >60 >60 ml/min/1.73m2    Calcium 7.8 (L) 8.5 - 10.1 MG/DL   VANCOMYCIN, TROUGH    Collection Time: 07/19/21  9:35 AM   Result Value Ref Range    Vancomycin,trough 9.0 (L) 10.0 - 20.0 ug/mL           No results for input(s): FIO2I, IFO2, HCO3I, IHCO3, HCOPOC, PCO2I, PCOPOC, IPHI, PHI, PHPOC, PO2I, PO2POC in the last 72 hours.     No lab exists for component: IPOC2    All Micro Results     Procedure Component Value Units Date/Time    CULTURE, BLOOD [[id number]] Collected: 07/14/21 1539    Order Status: Completed Specimen: Blood Updated: 07/19/21 1155     Special Requests: NO SPECIAL REQUESTS        Culture result: NO GROWTH 5 DAYS       CULTURE, BLOOD [[id number]] Collected: 07/14/21 1545    Order Status: Completed Specimen: Blood Updated: 07/19/21 1155     Special Requests: NO SPECIAL REQUESTS        Culture result: NO GROWTH 5 DAYS       CULTURE, URINE [[id number]] Collected: 07/14/21 1840    Order Status: Completed Specimen: Cath Urine Updated: 07/16/21 1353     Special Requests: NO SPECIAL REQUESTS        Culture result: No growth (<1,000 CFU/ML)       COVID-19 RAPID TEST [[id number]] Collected: 07/14/21 1650    Order Status: Completed Specimen: Nasopharyngeal Updated: 07/14/21 1722     Specimen source NASAL        COVID-19 rapid test Not detected Comment: Rapid Abbott ID Now       Rapid NAAT:  The specimen is NEGATIVE for SARS-CoV-2, the novel coronavirus associated with COVID-19. Negative results should be treated as presumptive and, if inconsistent with clinical signs and symptoms or necessary for patient management, should be tested with an alternative molecular assay. Negative results do not preclude SARS-CoV-2 infection and should not be used as the sole basis for patient management decisions. This test has been authorized by the FDA under an Emergency Use Authorization (EUA) for use by authorized laboratories. Fact sheet for Healthcare Providers: SLID[employer]nz  Fact sheet for Patients: [employer]       Methodology: Isothermal Nucleic Acid Amplification               Telemetry: normal sinus rhythm      Imaging:  [x]I have personally reviewed the patients chest radiographs images and report   Most recent  Results from Hospital Encounter encounter on 07/14/21    XR CHEST PORT    Narrative  EXAM: XR CHEST PORT    CLINICAL INDICATION/HISTORY: Aspiration pneumonia, CHF, atelectasis  -Additional: None    COMPARISON: Radiographs July 18, 2021    TECHNIQUE: Frontal view of the chest    _______________    FINDINGS:    HEART AND MEDIASTINUM: Stable appearing cardiac size and mediastinal contours. LUNGS AND PLEURAL SPACES: Asymmetric right mid to lower lung zone opacity again  noted with partial obscuration of the right heart border. No evidence of  pneumothorax or definite pleural effusion. BONY THORAX AND SOFT TISSUES: No acute osseous abnormality    _______________    Impression  Airspace disease throughout the right mid to lower lung zones in keeping with  pneumonitis/pneumonia. Appearance overall similar to prior.       Results from [place] encounter on 07/14/21    CT CHEST ABD PELV W CONT    Narrative  EXAM: CT of the chest, abdomen, and pelvis    INDICATION: Sepsis pna, nausea and vomiting (requested by admitting team). COMPARISON: None. TECHNIQUE: Axial CT imaging of the chest, abdomen, and pelvis was performed with  intravenous contrast. Multiplanar reformats were generated. One or more dose  reduction techniques were used on this CT: automated exposure control,  adjustment of the mAs and/or kVp according to patient size, and iterative  reconstruction techniques. The specific techniques used on this CT exam have  been documented in the patient's electronic medical record. Digital Imaging and Communications in Medicine (DICOM) format image data are  available to nonaffiliated external healthcare facilities or entities on a  secure, media free, reciprocally searchable basis with patient authorization for  at least a 12 month period after this study. _______________    FINDINGS:    CHEST:    LUNGS: No suspicious nodule or mass. There is multifocal bilateral  consolidation. This is more confluent within the right upper and lower lobes. PLEURA: Normal, with no effusion or pneumothorax. AIRWAY: Normal.    MEDIASTINUM: Normal heart size. No pericardial effusion. Given the limitations  of cardiac motion, great vessels are unremarkable. LYMPH NODES: No enlarged lymph nodes.    ===============    ABDOMEN/PELVIS:    LIVER, BILIARY: Liver attenuation is decreased suggestive of steatosis. No  biliary dilation. Gallbladder is unremarkable. PANCREAS: Normal.    SPLEEN: Normal.    ADRENALS: Normal.    KIDNEYS: Normal.    LYMPH NODES: No enlarged lymph nodes. GASTROINTESTINAL TRACT: No bowel dilation or wall thickening. PELVIC ORGANS: Fibroid uterus. VASCULATURE: Unremarkable. BONES: No acute or aggressive osseous abnormalities identified. OTHER: None.    _______________    Impression  1. Multilobar pneumonia    2. No significant findings within the abdomen or pelvis    3.  Fibroid uterus      CT Head 7/14/21  IMPRESSION   No acute intracranial findings      Voice recognition dictation used for this encounter; there could be some errors in dictation words.         Andrew Whelan MD

## 2021-07-19 NOTE — PROGRESS NOTES
Pulm/CC    Family meeting done with palliative care team, myself and family membersMr. Maribell Hannon. Reviewed patient's medical condition; advanced MS, bedbound state and debility, aspiration pneumonia, severe hypoxemia on high flow oxygen. Due to patient's fragile health, comorbid conditions, poor prognosis, discussed about comfort care measures. Patient is already DNR/DNI per family wishes. We discussed about transitioning to comfort care tomorrow morning, changing to nasal cannula oxygen and low-dose morphine drip, stopping medical treatment and keep comfort as goals of care. Patient is currently on low-dose fentanyl for use if needed if patient has pains. Family at this time leaning towards comfort care tomorrow morning.     Maribel Watson MD

## 2021-07-19 NOTE — PROGRESS NOTES
0730 Bedside and Verbal shift change report given to LEIDA Russell (oncoming nurse) by Candice Jarquin RN (offgoing nurse). Report included the following information SBAR, Kardex, Intake/Output, MAR, Recent Results and Cardiac Rhythm SR-ST.     0800 pt assessed at this time, pt non verbal, pt can track and focus with eyes, pt limited in movement, can move L arm, periodic leg spasms noted, HFNC in place, soto in place, VSS will continue to monitor    0945 Rounds completed with MD no new orders received at this time, will continue to monitor    1145 son at bedside, updated on pt condition and POC; no questions at this time, will continue to monitor    1200 pt reassessed, no change VSS , son at bedside, pt resting quietly in bed watching tv, no temp, denies pain, will continue to monitor    1300 spoke with son at the bedside regarding plan to discuss pts POC at 1430 with MD on the floor, pts daughter and . Answered all questions to sons liking, no additional questions or concerns at this time, pt in bed resting quietly with eyes closed, will continue to treat and monitor pt    1600 pt reassessed BP soft VS otherwise stable, pt resting in bed quietly with eyes closed, will continue to monitor    1900 Bedside and Verbal shift change report given to LEIDA Durham (oncoming nurse) by LEIDA Russell (offgoing nurse).  Report included the following information SBAR, Kardex, Intake/Output, MAR, Recent Results and Cardiac Rhythm SR-ST.

## 2021-07-19 NOTE — PROGRESS NOTES
Nutrition Assessment     Type and Reason for Visit: Reassess    Nutrition Recommendations/Plan: Diet: advance diet or consider other means of nutrition to avoid prolong NPO status as it does not meet nutritional needs    Nutrition Assessment:  Pt admitted w/ acute respiratory failure w/ hypoxia-oxygen improved sinced admission, aspiration PNA, h/o MS, seizure, sepsis, bedridden, cachexia, multiple pressure injury. Malnutrition Assessment:  Malnutrition Status: Severe malnutrition     Estimated Daily Nutrient Needs:  Energy (kcal):  3770-2787  Protein (g):  38-46       Fluid (ml/day):  3175-4189    Nutrition Related Findings:  labs: K 2.6, Phos 1.5. Med: Kcl, potassium phosphate, protonix, humalog. Pt remains NPO day 4. Current Nutrition Therapies:  DIET NPO    Anthropometric Measures:  · Height:  5' 3\" (160 cm)  · Current Body Wt:  38.3 kg (84 lb 7 oz)  · BMI: 15    Nutrition Diagnosis:   · Inadequate oral intake related to impaired respiratory function as evidenced by NPO or clear liquid status due to medical condition    Nutrition Intervention:  Food and/or Nutrient Delivery: Start oral diet, Start tube feeding  Nutrition Education and Counseling: No recommendations at this time  Coordination of Nutrition Care: Continue to monitor while inpatient    Goals:  Start PO diet or consider other means of nutrition to meet nutritional needs within the next 1-2 days       Nutrition Monitoring and Evaluation:   Behavioral-Environmental Outcomes: None identified  Food/Nutrient Intake Outcomes: Diet advancement/tolerance  Physical Signs/Symptoms Outcomes: Biochemical data, Nutrition focused physical findings, Skin, Weight, GI status, Nausea/vomiting    Discharge Planning:     Too soon to determine     Electronically signed by Toño Barros RD on 7/19/2021 at 9:08 AM

## 2021-07-19 NOTE — PROGRESS NOTES
RESPIRATORY NOTE:       Pt asleep and comfortable on HFNC, titrated to 40% FIO2, 40 LPM at this time.

## 2021-07-19 NOTE — PROGRESS NOTES
Chart reviewed noted pt remains in ICU on HFNC, and cardizem drip, this cm spoke with patient's daughter Mikayla Manuel at that time was to see how pt does over weekend prior to finalizing hospice decision, cm will cont to monitor case and remain available. For family.

## 2021-07-19 NOTE — PROGRESS NOTES
Hospitalist Progress Note-critical care note     Patient: Sebastian Prader MRN: [id number]  CSN: [id number]    YOB: 1960  Age: 64 y.o. Sex: female    DOA: 7/14/2021 LOS:  LOS: 5 days            Chief complaint: Acute respiratory failure sepsis seizure elevated troponin fell riding pneumonia.   MS, hypokalemia    Assessment/Plan         Hospital Problems  Never Reviewed        Codes Class Noted POA    Severe protein-calorie malnutrition ([facility] ICD-10-CM: E43  ICD-9-CM: 262  7/15/2021 Unknown        Hypokalemia ICD-10-CM: E87.6  ICD-9-CM: 276.8  7/15/2021 Unknown        Encounter for palliative care ICD-10-CM: Z51.5  ICD-9-CM: V66.7  Unknown Unknown        Debility ICD-10-CM: R53.81  ICD-9-CM: 799.3  Unknown Unknown        Sepsis ([facility] ICD-10-CM: A41.9  ICD-9-CM: 038.9, 995.91  7/14/2021         MS (multiple sclerosis) ([facility] ICD-10-CM: G35  ICD-9-CM: 340  Unknown Unknown        Seizure ([facility] ICD-10-CM: R56.9  ICD-9-CM: 780.39  Unknown Unknown        Pressure injury of sacral region, stage 4 (HCC) ICD-10-CM: L89.154  ICD-9-CM: 707.03, 707.24  Unknown Unknown        * (Principal) Acute respiratory failure with hypoxia ([facility] ICD-10-CM: J96.01  ICD-9-CM: 518.81  Unknown Unknown        Elevated troponin ICD-10-CM: R77.8  ICD-9-CM: 790.6  Unknown Unknown        Lactic acidosis ICD-10-CM: E87.2  ICD-9-CM: 276.2  Unknown Unknown        Bedridden ICD-10-CM: Z74.01  ICD-9-CM: V49.84  7/14/2021 Unknown        Pneumonia ICD-10-CM: J18.9  ICD-9-CM: 032  7/14/2021 Unknown        Cachexia ([facility] ICD-10-CM: R64  ICD-9-CM: 799.4  7/14/2021 Unknown               Resp -  acute respiratory failure with hypoxia  Still on high flow    Due to aspiration pna   pulm follow    Rapid covid 19 negative      Pna : due to aspiration   Continue cefepime and vanc and flagyl   Breathing tx as needed            ID -   Vanco and cefepime and flagyl   So far cx negative      Aspiration pneumonia, decubitus ulcer  Continue IV antibiotics,    CVS -   Elevated troponin:  ce trending down, echo:ef wnl   Dr. Herrera Cea f/u      Tachycardia: resolved   Low dose metoprolol      Heme/onc -   Leukocytosis:persistent      Renal -   Lactic acidosis: resolved   Hyponatremia :resolved   Hypokalemia  K replacement      Endocrine -  Follow FSG  Check tsh      Neuro   seizures: Received Ativan, Keppra in Er, will continue keppra   EEG showed no active seizure but left hemisphere intermittent sharp waves, which is consistent with history of epilepsy  Can not do mri   CT head no acute process     MS: Bedridden almost 1 year. Baseline: Sometimes can recognize family member     GI - NPO for now. ppi      Derm: Decubitus ulcer, lesion on ankle  Wound care.     Cachexia :     Prognosis is poor -palliative care f/u   30 minutes of critical care time spent in the direct evaluation and treatment of this high risk patient. The reason for providing this level of medical care for this critically ill patient was due a critical illness that impaired one or more vital organ systems such that there was a high probability of imminent or life threatening deterioration in the patients condition. This care involved high complexity decision making to assess, manipulate, and support vital system functions, to treat this degreee vital organ system failure and to prevent further life threatening deterioration of the patients condition. Rn: stable,   Disposition :tbd,   Review of systems:  Unable to obtain due to pt condition   Vital signs/Intake and Output:  Visit Vitals  BP (!) 100/49   Pulse (!) 109   Temp 99 °F (37.2 °C)   Resp 26   Ht 5' 3\" (1.6 m)   Wt 37 kg (81 lb 9.1 oz)   SpO2 99%   BMI 14.45 kg/m²     Current Shift:  07/19 0701 - 07/19 1900  In: 822.8 [I.V.:822.8]  Out: 800 [Urine:800]  Last three shifts:  07/17 1901 - 07/19 0700  In: 369.1 [I.V.:369.1]  Out: 3200 [Urine:3200]    Physical Exam:  General: Alert, not cooperative,   HEENT: NC, Atraumatic.   PERRLA, anicteric sclerae. Non-rebreathing mask noted   Lungs: CTA Bilaterally. No Wheezing/Rhonchi/Rales. Heart:  Regular  rhythm,  No murmur, No Rubs, No Gallops  Abdomen: Soft, Non distended, Non tender. +Bowel sounds,   Extremities: No c/c/e  Psych:   Calm   Neurologic:  Not follow command, eyes blinks            Labs: Results:       Chemistry Recent Labs     07/19/21  0542 07/18/21  1035 07/18/21  0505 07/17/21  1745 07/17/21  0617   *  --  118*  --  99     --  140  --  137   K 2.6* 3.7 4.7   < > 3.4*     --  108  --  104   CO2 22  --  23  --  23   BUN 6*  --  8  --  8   CREA 0.17*  --  0.27*  --  0.23*   CA 7.8*  --  7.9*  --  8.2*   AGAP 12  --  9  --  10   BUCR 35*  --  30*  --  35*    < > = values in this interval not displayed. CBC w/Diff Recent Labs     07/19/21  0542 07/18/21  0505 07/17/21  0617   WBC 20.0* 18.9* 24.8*   RBC 2.83* 3.71* 3.73*   HGB 7.9* 10.1* 10.4*   HCT 25.0* 33.4* 33.6*    323 364   GRANS 88* 84* 91*   LYMPH 4* 3* 2*   EOS 2 3 0      Cardiac Enzymes No results for input(s): CPK, CKND1, JONAH in the last 72 hours. No lab exists for component: CKRMB, TROIP   Coagulation No results for input(s): PTP, INR, APTT, INREXT, INREXT in the last 72 hours. Lipid Panel No results found for: CHOL, CHOLPOCT, CHOLX, CHLST, CHOLV, 375219, HDL, HDLP, LDL, LDLC, DLDLP, 219825, VLDLC, VLDL, TGLX, TRIGL, TRIGP, TGLPOCT, CHHD, CHHDX   BNP No results for input(s): BNPP in the last 72 hours. Liver Enzymes No results for input(s): TP, ALB, TBIL, AP in the last 72 hours. No lab exists for component: SGOT, GPT, DBIL   Thyroid Studies No results found for: T4, T3U, TSH, TSHEXT, TSHEXT     Procedures/imaging: see electronic medical records for all procedures/Xrays and details which were not copied into this note but were reviewed prior to creation of Plan    CT HEAD WO CONT    Result Date: 7/14/2021  EXAM: CT head INDICATION: Altered mental status COMPARISON: None.  TECHNIQUE: Axial CT imaging of the head was performed without intravenous contrast.One or more dose reduction techniques were used on this CT: automated exposure control, adjustment of the mAs and/or kVp according to patient size, and iterative reconstruction techniques. The specific techniques used on this CT exam have been documented in the patient's electronic medical record. Digital Imaging and Communications in Medicine (DICOM) format image data are available to nonaffiliated external healthcare facilities or entities on a secure, media free, reciprocally searchable basis with patient authorization for at least a 12 month period after this study. _______________ FINDINGS: BRAIN AND POSTERIOR FOSSA: The sulci, folia, ventricles and basal cisterns are within normal limits for the patient's age. There is no intracranial hemorrhage, mass effect, or midline shift. There are areas of decreased attenuation within the periventricular and subcortical white matter. EXTRA-AXIAL SPACES AND MENINGES: There are no abnormal extra-axial fluid collections. CALVARIUM: Intact. SINUSES: Clear. OTHER: None. _______________     No acute intracranial findings    CT CHEST ABD PELV W CONT    Result Date: 7/14/2021  EXAM: CT of the chest, abdomen, and pelvis INDICATION: Sepsis pna, nausea and vomiting (requested by admitting team). COMPARISON: None. TECHNIQUE: Axial CT imaging of the chest, abdomen, and pelvis was performed with intravenous contrast. Multiplanar reformats were generated. One or more dose reduction techniques were used on this CT: automated exposure control, adjustment of the mAs and/or kVp according to patient size, and iterative reconstruction techniques. The specific techniques used on this CT exam have been documented in the patient's electronic medical record.  Digital Imaging and Communications in Medicine (DICOM) format image data are available to nonaffiliated external healthcare facilities or entities on a secure, media free, reciprocally searchable basis with patient authorization for at least a 12 month period after this study. _______________ FINDINGS: CHEST: LUNGS: No suspicious nodule or mass. There is multifocal bilateral consolidation. This is more confluent within the right upper and lower lobes. PLEURA: Normal, with no effusion or pneumothorax. AIRWAY: Normal. MEDIASTINUM: Normal heart size. No pericardial effusion. Given the limitations of cardiac motion, great vessels are unremarkable. LYMPH NODES: No enlarged lymph nodes. =============== ABDOMEN/PELVIS: LIVER, BILIARY: Liver attenuation is decreased suggestive of steatosis. No biliary dilation. Gallbladder is unremarkable. PANCREAS: Normal. SPLEEN: Normal. ADRENALS: Normal. KIDNEYS: Normal. LYMPH NODES: No enlarged lymph nodes. GASTROINTESTINAL TRACT: No bowel dilation or wall thickening. PELVIC ORGANS: Fibroid uterus. VASCULATURE: Unremarkable. BONES: No acute or aggressive osseous abnormalities identified. OTHER: None. _______________     1. Multilobar pneumonia 2. No significant findings within the abdomen or pelvis 3. Fibroid uterus    XR CHEST PORT    Result Date: 7/14/2021  EXAM: XR CHEST PORT CLINICAL INDICATION/HISTORY: respiratory failure -Additional: None COMPARISON: None TECHNIQUE: Portable frontal view of the chest _______________ FINDINGS: SUPPORT DEVICES: None. HEART AND MEDIASTINUM: Cardiomediastinal silhouette within normal limits. LUNGS AND PLEURAL SPACES: Bilateral patchy parenchymal/interstitial opacities, right greater left. No large effusion or pneumothorax. _______________     Bilateral patchy parenchymal/interstitial opacities, right greater left. May reflect atypical infection.       Juan Daniel Zuniga MD

## 2021-07-19 NOTE — PROGRESS NOTES
Palliative Medicine    CODE STATUS: DNR/DNI     AMD Status: AMD on file naming her  and her daughter, Margo Sen, as her surrogate decision makers     7/19/2021 0825 Seen today in room ICU 5 along with SMOOTH Hein.Lying in bed. Eyes open. Did nod her head to some questions. High flow NC on. Cardizem infusing. Tachycardic low 100s. 1010: Phone call with Ania Corrales (daughter). Brief medical update given. Asked if she and her dad had discussed the plan of care and she said they had spoken some but had made no changes to the current plans. She knows hospice is available and they are still highly considering this as an option. Stated that her brother is coming into town today and she hopes to incorporate him into discussion with their father to determine if the goals of care are changing. Discussed that if they decide to take her home on hospice, we would want to coordinate timing of transportation and hospice staff availability as I believe she may not tolerate the movement well    1430: Family meeting with SMOOTH Galloway, Mr Cortes Salinas,  Other daughter (name unknown) on telephone, and me. Brief medical update given with explanation that patient is not improving and continues to need high flow oxygen and Cardizem infusion. Offered opportunity for continued aggressive intervention or transition to comfort care. It was explained that the expectation is the Mrs Xiomara Barnes will pass away in the hospital if she transitions to comfort care. Questions answered. Family decided to move to comfort care tomorrow as Fredrick's birthday is today.  They will call the palliative office with a time once they have decided     Disposition plan: anticipate transition to comfort care tomorrow     Palliative care will continue to follow Jane Euceda during her hospitalization and support her and her family as they make healthcare decisions and determine goals of care.      Charles Murray RN, MSN  Palliative Medicine  P: [phone]

## 2021-07-20 NOTE — PROGRESS NOTES
Chart reviewed and visited unit, spoke with  nurse Amarjit Cardoza, informed cm that family plans to meet today at bedside making the decision to transition to comfort measures.

## 2021-07-20 NOTE — PROGRESS NOTES
1915- Report and care received, partial assessment completed per flow sheet. Resting calmly with eyes closed, NAD, appears comfortable. Did not attempt to stimulate, comfort measures in effect. Family at bedside, declines  visit. 2043- Report given to KAYLA Andrew RN. 2105- Transferred to room 303 via bed, respirations shallow and unlabored.  Receiving RN aware of arrival.

## 2021-07-20 NOTE — DIABETES MGMT
GLYCEMIC CONTROL PROGRESS NOTE:    - chart reviewed, no known h/o DM, HbA1C 6.2%  - BG out of target range ICU: 140-180 mg/dL  -TDD = 8 units Humalog Normal Insulin Sensitivity Corrective Coverage, recommend continue     Recent Glucose Results:   Lab Results   Component Value Date/Time     (H) 07/20/2021 03:10 AM    GLUCPOC 125 (H) 07/20/2021 05:11 AM    GLUCPOC 233 (H) 07/19/2021 11:10 PM    GLUCPOC 165 (H) 07/19/2021 05:30 PM         Brian Lynn MS, RN, CDE  Glycemic Control Team  [phone]  Pager [phone] (M-TH 8:00-4:30P)  *After Hours pager [phone]

## 2021-07-20 NOTE — PROGRESS NOTES
Palliative Medicine    CODE STATUS: DNR--transitioned to comfort care at 1530    AMD Status: has AMD on file naming her  and daughter as her MPOAs     7/20/2021 0900 Seen today in room ICU 5 along with Guadalupe Rodriguez NP. Lying in bed with eyes closed. Cardizem infusing. High flow oxygen on. Seems less responsive than yesterday. Family left message that they were coming in at 56 to transition the plan of care to comfort measures. [phone]: Family here, . Nicolasa, two daughters, Jean Stallworth and Marcial Zuleika, and son, Sammie Herrera. They agree to transition to comfort measures. Orders being placed. They are in the room visiting with Mrs Kadeem Le. CODE STATUS:  DNR/DNI--comfort measures    Disposition plan: anticipate death while in the hospital    Palliative care will continue to follow Izabela Padgett  and her family during her hospitalization and support them as they make healthcare decisions and define goals of care.         Advanced Steps [facility] (Physician Orders for Scope of Treatment)       Participants:   [] Patient    [x] Healthcare agent (already designated in existing ACP document)    Name: Nany Posadas  Relationship to Patient: spouse    Phone number: [phone]  [] Other Surrogate Decision Maker / [facility]    Name: Anniece Bamberger    Relationship to Patient: daughter    Phone Number: [phone]     Other persons present:   Name: Marcial To    Relationship to patient: daughter   Name: Elizabeth Torres    Relationship to patient: son   Elbacarlos manuel HagerARLINE    Conversation Topics   Understanding of Medical Condition/s AND Potential Complications: family decided to transition to comfort care after prolonged time on high flow oxygen without improvement  Cardiopulmonary Resuscitation      Order Elected for CPR:  []  Attempt Resuscitation [x]  Do Not Attempt Resuscitation  When NOT in Cardiopulmonary Arrest, Order Elected:      [x] Comfort Measures  [] Limited Additional Interventions  [] Full Interventions    Artificially Administered Nutrition, Order Elected:    [x] No Feeding Tube   [] Feeding Tube for a defined trial period  [] Feeding Tube long-term if indicated    The following was provided (check all that apply):     [] Review of existing Advance Directive  [] Assistance with Completion of New Advance Directive   [x] Review of [place] POST Form       Meeting Outcomes:   [x] ACP discussion completed   [x] Tiff form completed  [x] Tiff prepared for Provider review and signature   [x] Original placed on Chart, if in facility (form to be sent with patient at discharge)  [] Copy given to healthcare agent    [] Copy scanned to electronic medical record  Tashi Boone RN, MSN  Palliative Medicine  P: [phone]

## 2021-07-20 NOTE — PROGRESS NOTES
0700 Report from Luigi Olivia RN  0800 Assessment, per palliative care, the family has decided to move forward with comfort measures only starting this afternoon at 1500, no orders yet, will continue medical treatment as ordered  1200 Reassessment, no changes  1600 Reassessment, family at bedside, orders in for PRN morphine and ativan, patient transitioned from high flow to regular nasal cannula at 2L. Multiple family members at bedside.    1900 Report given to Luigi Olivia RN

## 2021-07-20 NOTE — PROGRESS NOTES
1930- Report and care received, assessment completed per flow sheet. Aphasic, contracted, opens eyes to verbal. Nods head yes to questions, however, nods head yes even when not interacting, never noted to nod head no. NAD.     2300- Reassessment without change. 0400- Reassessment without change.

## 2021-07-20 NOTE — PROGRESS NOTES
[facility]  Pulmonary, Critical Care, and Sleep Medicine    Name: Shivani Orlando MRN: [id number]   : 1960 Hospital: [facility] [place]   Date: 2021        Pulmonary Critical Care Note    IMPRESSION:   · Acute hypoxic respiratory failure · J96.01 ·   Aspiration pneumonia · J69.0 ·   Sepsis with organ dysfunction · A41.9, R65.20 ·   Leukocytosis · D72.829 ·   Elevated troponin · R77.8 ·   Lactic acidosis, resolved · E87.2 ·   Seizure d/o ·  ·   Multiple sclerosis with spastic paralysis ·      Patient Active Problem List   Diagnosis Code    Sepsis ([address] A41.9    MS (multiple sclerosis) ([address] G35    Seizure ([address] R56.9    Pressure injury of sacral region, stage 4 ([address] L89.154    Acute respiratory failure with hypoxia ([address] J96.01    Elevated troponin R77.8    Lactic acidosis E87.2    Bedridden Z74.01    Pneumonia J18.9    Cachexia (HCC) R64    Severe protein-calorie malnutrition (HCC) E43    Hypokalemia E87.6    Encounter for palliative care Z51.5    Debility R53.81      RECOMMENDATIONS:   Respiratory: Patient with aspiration pneumonia and multiple sclerosis. Patient remains on high flow nasal cannula oxygen. Chest x-ray and chest CT shows evidence of bilateral lower lobe aspiration pneumoniaworse in the right lower lobe. DNR/DNI CODE STATUS. Aspiration precautions; keep head end elevated; nebulizers as needed. ID: Continue broad-spectrum antibiotics for nowpatient on combination of cefepime, IV vancomycin and metronidazole for aspiration pneumonia. Chest imaging has shown persistent bilateral lower lobe consolidationsworse in right lung. CVS: Patient remains on Cardizem drip; history of SVT; currently being maintained in sinus tachycardia. Patient also on scheduled metoprolol IV dosing. Cardiology on case. ECHO 7/15/21  · LV: Estimated LVEF is 55 - 60%. Normal cavity size, wall thickness and systolic function (ejection fraction normal).  Mild (grade 1) left ventricular diastolic dysfunction E/E' ratio = 8.02.  · RV: Borderline low systolic function. · Tricuspid regurgitation is inadequate for estimation of right ventricular systolic pressure  Renal: Stable renal function and urine output; sodium and creatinine remains normal.  Continue maintenance IV fluids. Replace electrolytes as needed. K-Phos ordered. Heme: Stable hemoglobin and platelets. Endo: Monitor blood sugars for hypoglycemia. GI: Poor oral intake; high risk for aspiration and poor mentation. Neurology: Advanced MS; CT head nil acute on admission. Patient on baclofen pump for MS related complications. Continue Kepprafor seizures. CT head 7/14/21 - nil acute; unable to do MRI secondary to baclofen pump. EEG 7/15/21 -no seizures; diffuse slowing consistent with encephalopathy. Prophylaxis: Lovenox and pantoprazole. Fluids: Maintenance D5 half-normal saline with 20 mg KCl 50 mL/h. Prognosis poor from advanced MS, with aspiration pneumonia and severe hypoxemia, on high flow oxygen supplemental therapy. CODE STATUSDNR/DNI. Palliative care on case. Family meeting done yesterday with , son and daughter; plan for transitioning to comfort care later this afternoon. Quality Care: PPI, DVT prophylaxis, HOB elevated, Infection control all reviewed and addressed. PAIN AND SEDATION: prn low-dose fentanyl for pain; avoid sedatives. Skin/Wound: skin breakdowns LT hip, sacrum  Prophylaxis: DVT and GI Prophylaxis reviewed. Restraints: none   Lines/Tubes: PIVs      ADVANCE DIRECTIVE: DNR/DNI; palliative care team on case. High complexity decision making was performed during the evaluation of this patient at high risk for decompensation with multiple organ involvement.          Subjective/History:   Ms. Vy Cobos has been seen and evaluated as Dr. Jonna Lopez requested now for assisting in ICU care for Acute hypoxic respiratory failure, aspiration pneumonia with sepsis with organ dysfunction. Patient is a 64 y.o. female with PMHx for MS, seizure d/o, decub ulcers presented from home via EMS for progressive change in mentation and lethargy, poor appetite, increased work of breathing, fever, vomiting, episode of aspiration. The patient is critically ill and can not provide additional history due to unable to comprehend. Information limited and provided by . Pt is bed bound at home from MS; feeds orally, able to smile back and may say \"hi\" back once in while otherwise non-verbal. In Er, pt noted to be hypoxic requiring NRBM for increased work of breathing and hypoxia. XR chest showed bibasilar infiltrates. Labs showed leukocytosis, elevated lactic acid, troponin, pro-BNP. Pt is admitted to ICU for close monitoring. She is DNR/DNI per ER provider and . Other information is limited. 07/20/21   Patient remains in ICU. Patient on high flow nasal cannula oxygen. Patient cachectic. Patient poorly responsive. Advanced MS at baseline with bedbound status and impaired mentation. Patient remains on Cardizem drip. Telemetrysinus tachycardia; blood pressure stable. No reports of vomiting or diarrhea. Review of Systems:  Limited due to patient condition       Past Medical History:  Past Medical History:   Diagnosis Date    MS ([address]) ([facility] [address]         Past Surgical History:  No past surgical history on file. Medications:  Prior to Admission medications    Medication Sig Start Date End Date Taking? Authorizing Provider   baclofen (LIORESAL) 10 mg tablet Take  by mouth three (3) times daily. Yes Provider, Historical   metoprolol succinate (TOPROL-XL) 25 mg XL tablet Take 25 mg by mouth daily. Provider, Historical   folic acid (FOLVITE) 1 mg tablet Take 1 mg by mouth daily. Provider, Historical   predniSONE (DELTASONE) 10 mg tablet Take 5 mg by mouth daily.     Provider, Historical   cholecalciferol, vitamin D3, (Vitamin D3) 50 mcg (2,000 unit) tab Take 2,000 Units by mouth daily. Provider, Historical   clonazePAM (KlonoPIN) 0.5 mg tablet Take 0.25 mg by mouth two (2) times a day. Provider, Historical   baclofen (LIORESAL) 20 mg tablet Take 20 mg by mouth two (2) times a day. Provider, Historical   levETIRAcetam (Keppra) 500 mg tablet Take 500 mg by mouth two (2) times a day. 2 tabs in the am; 1 tab at night    Provider, Historical       Current Facility-Administered Medications   Medication Dose Route Frequency    dextrose 5% - 0.45% NaCl with KCl 20 mEq/L infusion  50 mL/hr IntraVENous CONTINUOUS    vancomycin (VANCOCIN) 750 mg in 0.9% sodium chloride 250 mL (VIAL-MATE)  750 mg IntraVENous Q12H    dilTIAZem (CARDIZEM) 125 mg in dextrose 5% 125 mL infusion  0-15 mg/hr IntraVENous TITRATE    metroNIDAZOLE (FLAGYL) IVPB premix 500 mg  500 mg IntraVENous Q12H    cloNIDine (CATAPRES) 0.1 mg/24 hr patch 1 Patch  1 Patch TransDERmal Q7D    baclofen intrathecal soln (Patient Supplied)  6.25 mcg/hr Intrathecal CONTINUOUS    enoxaparin (LOVENOX) injection 30 mg  30 mg SubCUTAneous Q24H    insulin lispro (HUMALOG) injection   SubCUTAneous Q6H    metoprolol (LOPRESSOR) injection 5 mg  5 mg IntraVENous Q6H    sodium chloride (NS) flush 5-40 mL  5-40 mL IntraVENous Q8H    levETIRAcetam (KEPPRA) 1,000 mg in 0.9% sodium chloride 100 mL IVPB  1,000 mg IntraVENous Q12H    cefepime (MAXIPIME) 2 g in sterile water (preservative free) 10 mL IV syringe  2 g IntraVENous Q12H    pantoprazole (PROTONIX) 40 mg in 0.9% sodium chloride 10 mL injection  40 mg IntraVENous DAILY    Vancomycin - Pharmacy to dose   1 Each Other Rx Dosing/Monitoring       Allergy:  Allergies   Allergen Reactions    Pcn [Penicillins] Unknown (comments)        Social History:  Social History     Tobacco Use    Smoking status: Not on file   Substance Use Topics    Alcohol use: Not on file    Drug use: Not on file        Family History:  No family history on file.        Objective:   Vital Signs: Blood pressure (!) 120/57, pulse (!) 129, temperature 99.4 °F (37.4 °C), resp. rate 26, height 5' 3\" (1.6 m), weight 37 kg (81 lb 9.1 oz), SpO2 93 %. Body mass index is 14.45 kg/m².    O2 Device: Heated, Hi flow nasal cannula   O2 Flow Rate (L/min): 40 l/min   Temp (24hrs), Av.3 °F (37.4 °C), Min:99 °F (37.2 °C), Max:99.6 °F (37.6 °C)         Intake/Output:   Last shift:      701 - 1900  In: 62.7 [I.V.:62.7]  Out: -   Last 3 shifts: 1901 -  0700  In: 3458.2 [I.V.:3458.2]  Out: 3300 [Urine:3300]    Intake/Output Summary (Last 24 hours) at 2021 1223  Last data filed at 2021 0800  Gross per 24 hour   Intake 2328.99 ml   Output 400 ml   Net 1928.99 ml       Physical Exam:   Patient cachectic; does not appear to be in pain; remains on high flow nasal cannula oxygen40 L, 40% FiO2; acyanotic; appears chronically ill  HEENT: pupils not dilated, no scleral jaundice, dry oral mucosa, no nasal drainage  Neck: No adenopathy or thyroid swelling  CVS: Sinus tachycardia; S1 and S2 with no murmur; JVD not elevated   RS: Symmetrical breath sounds; moderate to poor air entry bilateral; no wheezing; bilateral lower chest crackles; mildly tachypneic but not in distress   Abd: soft, thin abdomen; no distention or tenderness; no hepatosplenomegaly  Neuro: Patient cachectic; somnolent at baseline; sometimes opens eyes; not purposeful  Extrm: no leg edema or swelling or clubbing; bilateral wasted and contracted upper extremities and lower extremities  Skin: no rash  Lymphatic: no cervical or supraclavicular adenopathy  Vasc: Poor DPs in feet      Data:     Recent Results (from the past 24 hour(s))   GLUCOSE, POC    Collection Time: 21 12:31 PM   Result Value Ref Range    Glucose (POC) 135 (H) 70 - 110 mg/dL   POTASSIUM    Collection Time: 21  5:26 PM   Result Value Ref Range    Potassium 3.8 3.5 - 5.5 mmol/L   PHOSPHORUS    Collection Time: 21  5:26 PM   Result Value Ref Range Phosphorus 1.4 (L) 2.5 - 4.9 MG/DL   GLUCOSE, POC    Collection Time: 07/19/21  5:30 PM   Result Value Ref Range    Glucose (POC) 165 (H) 70 - 110 mg/dL   GLUCOSE, POC    Collection Time: 07/19/21 11:10 PM   Result Value Ref Range    Glucose (POC) 233 (H) 70 - 110 mg/dL   CBC WITH AUTOMATED DIFF    Collection Time: 07/20/21  3:10 AM   Result Value Ref Range    WBC 19.6 (H) 4.6 - 13.2 K/uL    RBC 3.18 (L) 4.20 - 5.30 M/uL    HGB 8.6 (L) 12.0 - 16.0 g/dL    HCT 28.6 (L) 35.0 - 45.0 %    MCV 89.9 74.0 - 97.0 FL    MCH 27.0 24.0 - 34.0 PG    MCHC 30.1 (L) 31.0 - 37.0 g/dL    RDW 17.2 (H) 11.6 - 14.5 %    PLATELET 285 395 - 774 K/uL    MPV 10.8 9.2 - 11.8 FL    NEUTROPHILS 85 (H) 40 - 73 %    BAND NEUTROPHILS 1 0 - 5 %    LYMPHOCYTES 9 (L) 21 - 52 %    MONOCYTES 3 3 - 10 %    EOSINOPHILS 2 0 - 5 %    BASOPHILS 0 0 - 2 %    ABS. NEUTROPHILS 16.8 (H) 1.8 - 8.0 K/UL    ABS. LYMPHOCYTES 1.8 0.9 - 3.6 K/UL    ABS. MONOCYTES 0.6 0.05 - 1.2 K/UL    ABS. EOSINOPHILS 0.4 0.0 - 0.4 K/UL    ABS.  BASOPHILS 0.0 0.0 - 0.1 K/UL    DF MANUAL      PLATELET COMMENTS LARGE PLATELETS      RBC COMMENTS ANISOCYTOSIS  1+        RBC COMMENTS TARGET CELLS  BASOPHILIC STIPPLING  OCCASIONAL        WBC COMMENTS REACTIVE LYMPHS     MAGNESIUM    Collection Time: 07/20/21  3:10 AM   Result Value Ref Range    Magnesium 1.9 1.6 - 2.6 mg/dL   PHOSPHORUS    Collection Time: 07/20/21  3:10 AM   Result Value Ref Range    Phosphorus 1.8 (L) 2.5 - 4.9 MG/DL   METABOLIC PANEL, BASIC    Collection Time: 07/20/21  3:10 AM   Result Value Ref Range    Sodium 140 136 - 145 mmol/L    Potassium 4.4 3.5 - 5.5 mmol/L    Chloride 111 100 - 111 mmol/L    CO2 22 21 - 32 mmol/L    Anion gap 7 3.0 - 18 mmol/L    Glucose 113 (H) 74 - 99 mg/dL    BUN 7 7.0 - 18 MG/DL    Creatinine 0.34 (L) 0.6 - 1.3 MG/DL    BUN/Creatinine ratio 21 (H) 12 - 20      GFR est AA >60 >60 ml/min/1.73m2    GFR est non-AA >60 >60 ml/min/1.73m2    Calcium 7.8 (L) 8.5 - 10.1 MG/DL   GLUCOSE, POC Collection Time: 07/20/21  5:11 AM   Result Value Ref Range    Glucose (POC) 125 (H) 70 - 110 mg/dL           No results for input(s): FIO2I, IFO2, HCO3I, IHCO3, HCOPOC, PCO2I, PCOPOC, IPHI, PHI, PHPOC, PO2I, PO2POC in the last 72 hours. No lab exists for component: IPOC2    All Micro Results     Procedure Component Value Units Date/Time    CULTURE, BLOOD [[id number]] Collected: 07/14/21 1545    Order Status: Completed Specimen: Blood Updated: 07/20/21 0916     Special Requests: NO SPECIAL REQUESTS        Culture result: NO GROWTH 6 DAYS       CULTURE, BLOOD [[id number]] Collected: 07/14/21 1539    Order Status: Completed Specimen: Blood Updated: 07/20/21 0916     Special Requests: NO SPECIAL REQUESTS        Culture result: NO GROWTH 6 DAYS       CULTURE, URINE [[id number]] Collected: 07/14/21 1840    Order Status: Completed Specimen: Cath Urine Updated: 07/16/21 1353     Special Requests: NO SPECIAL REQUESTS        Culture result: No growth (<1,000 CFU/ML)       COVID-19 RAPID TEST [[id number]] Collected: 07/14/21 1650    Order Status: Completed Specimen: Nasopharyngeal Updated: 07/14/21 1722     Specimen source NASAL        COVID-19 rapid test Not detected        Comment: Rapid Abbott ID Now       Rapid NAAT:  The specimen is NEGATIVE for SARS-CoV-2, the novel coronavirus associated with COVID-19. Negative results should be treated as presumptive and, if inconsistent with clinical signs and symptoms or necessary for patient management, should be tested with an alternative molecular assay. Negative results do not preclude SARS-CoV-2 infection and should not be used as the sole basis for patient management decisions. This test has been authorized by the FDA under an Emergency Use Authorization (EUA) for use by authorized laboratories.    Fact sheet for Healthcare Providers: ConventionUpdate.co.nz  Fact sheet for Patients: ConventionUpdate.co.nz       Methodology: Isothermal Nucleic Acid Amplification               Telemetry: normal sinus rhythm      Imaging:  [x]I have personally reviewed the patients chest radiographs images and report   Most recent  Results from Hospital Encounter encounter on 07/14/21    XR CHEST PORT    Narrative  EXAM: XR CHEST PORT    CLINICAL INDICATION/HISTORY: Aspiration pneumonia, CHF, atelectasis  -Additional: None    COMPARISON: Radiographs July 18, 2021    TECHNIQUE: Frontal view of the chest    _______________    FINDINGS:    HEART AND MEDIASTINUM: Stable appearing cardiac size and mediastinal contours. LUNGS AND PLEURAL SPACES: Asymmetric right mid to lower lung zone opacity again  noted with partial obscuration of the right heart border. No evidence of  pneumothorax or definite pleural effusion. BONY THORAX AND SOFT TISSUES: No acute osseous abnormality    _______________    Impression  Airspace disease throughout the right mid to lower lung zones in keeping with  pneumonitis/pneumonia. Appearance overall similar to prior. Results from Hospital Encounter encounter on 07/14/21    CT CHEST ABD PELV W CONT    Narrative  EXAM: CT of the chest, abdomen, and pelvis    INDICATION: Sepsis pna, nausea and vomiting (requested by admitting team). COMPARISON: None. TECHNIQUE: Axial CT imaging of the chest, abdomen, and pelvis was performed with  intravenous contrast. Multiplanar reformats were generated. One or more dose  reduction techniques were used on this CT: automated exposure control,  adjustment of the mAs and/or kVp according to patient size, and iterative  reconstruction techniques. The specific techniques used on this CT exam have  been documented in the patient's electronic medical record.     Digital Imaging and Communications in Medicine (DICOM) format image data are  available to nonaffiliated external healthcare facilities or entities on a  secure, media free, reciprocally searchable basis with patient authorization for  at least a 12 month period after this study. _______________    FINDINGS:    CHEST:    LUNGS: No suspicious nodule or mass. There is multifocal bilateral  consolidation. This is more confluent within the right upper and lower lobes. PLEURA: Normal, with no effusion or pneumothorax. AIRWAY: Normal.    MEDIASTINUM: Normal heart size. No pericardial effusion. Given the limitations  of cardiac motion, great vessels are unremarkable. LYMPH NODES: No enlarged lymph nodes.    ===============    ABDOMEN/PELVIS:    LIVER, BILIARY: Liver attenuation is decreased suggestive of steatosis. No  biliary dilation. Gallbladder is unremarkable. PANCREAS: Normal.    SPLEEN: Normal.    ADRENALS: Normal.    KIDNEYS: Normal.    LYMPH NODES: No enlarged lymph nodes. GASTROINTESTINAL TRACT: No bowel dilation or wall thickening. PELVIC ORGANS: Fibroid uterus. VASCULATURE: Unremarkable. BONES: No acute or aggressive osseous abnormalities identified. OTHER: None.    _______________    Impression  1. Multilobar pneumonia    2. No significant findings within the abdomen or pelvis    3. Fibroid uterus      CT Head 7/14/21  IMPRESSION   No acute intracranial findings      Voice recognition dictation used for this encounter; there could be some errors in dictation words.         Jennifer Segovia MD

## 2021-07-20 NOTE — PROGRESS NOTES
Hospitalist Progress Note-critical care note     Patient: Haresh Stein MRN: [id number]  CSN: [id number]    YOB: 1960  Age: 64 y.o. Sex: female    DOA: 7/14/2021 LOS:  LOS: 6 days            Chief complaint: Acute respiratory failure sepsis seizure elevated troponin fell riding pneumonia.   MS, hypokalemia    Assessment/Plan         Hospital Problems  Never Reviewed        Codes Class Noted POA    Severe protein-calorie malnutrition ([facility] ICD-10-CM: E43  ICD-9-CM: 262  7/15/2021 Unknown        Hypokalemia ICD-10-CM: E87.6  ICD-9-CM: 276.8  7/15/2021 Unknown        Encounter for palliative care ICD-10-CM: Z51.5  ICD-9-CM: V66.7  Unknown Unknown        Debility ICD-10-CM: R53.81  ICD-9-CM: 799.3  Unknown Unknown        Sepsis ([facility] ICD-10-CM: A41.9  ICD-9-CM: 038.9, 995.91  7/14/2021         MS (multiple sclerosis) ([facility] ICD-10-CM: G35  ICD-9-CM: 340  Unknown Unknown        Seizure ([facility] ICD-10-CM: R56.9  ICD-9-CM: 780.39  Unknown Unknown        Pressure injury of sacral region, stage 4 (HCC) ICD-10-CM: L89.154  ICD-9-CM: 707.03, 707.24  Unknown Unknown        * (Principal) Acute respiratory failure with hypoxia ([facility] ICD-10-CM: J96.01  ICD-9-CM: 518.81  Unknown Unknown        Elevated troponin ICD-10-CM: R77.8  ICD-9-CM: 790.6  Unknown Unknown        Lactic acidosis ICD-10-CM: E87.2  ICD-9-CM: 276.2  Unknown Unknown        Bedridden ICD-10-CM: Z74.01  ICD-9-CM: V49.84  7/14/2021 Unknown        Pneumonia ICD-10-CM: J18.9  ICD-9-CM: 904  7/14/2021 Unknown        Cachexia ([facility] ICD-10-CM: W65  ICD-9-CM: 799.4  7/14/2021 Unknown             pt was admitted to the hospital due to acute respiratory failure  And aspiration pna,   Not respond to treatment, family decide to transient to comfort care later today   Case discussed with dr. Carlos Landaverde and palliative care team   Resp  acute respiratory failure with hypoxia  on high flow-will continue till comfort care started     Due to aspiration pna   pulm follow    Rapid covid 19 negative      Pna : due to aspiration   Continue cefepime and vanc and flagyl for now   Breathing tx as needed            ID -   Vanco and cefepime and flagyl   So far cx negative      Aspiration pneumonia, decubitus ulcer  Continue IV antibiotics,      CVS -   Elevated troponin:  ce trending down, echo:ef wnl   Dr. Cely Barrear f/u      Tachycardia: resolved   Low dose metoprolol      Heme/onc -   Leukocytosis:persistent      Renal -   Lactic acidosis: resolved   Hyponatremia :resolved   Hypokalemia  K replacement      Endocrine -  Follow FSG  Check tsh      Neuro   seizures: Received Ativan, Keppra in [place], will continue keppra   EEG showed no active seizure but left hemisphere intermittent sharp waves, which is consistent with history of epilepsy  Can not do mri   CT head no acute process     MS: Bedridden almost 1 year. Baseline: Sometimes can recognize family member     GI - NPO for now. ppi      Derm: Decubitus ulcer, lesion on ankle  Wound care.     Cachexia :     Prognosis is poor -palliative care f/u -will start comfort care when family arrive     Rn: stable,   Disposition :tbd,   Review of systems:  Unable to obtain due to pt condition   Vital signs/Intake and Output:  Visit Vitals  /60   Pulse (!) 121   Temp 99.6 °F (37.6 °C)   Resp 30   Ht 5' 3\" (1.6 m)   Wt 37 kg (81 lb 9.1 oz)   SpO2 95%   BMI 14.45 kg/m²     Current Shift:  07/20 0701 - 07/20 1900  In: 652.7 [I.V.:652.7]  Out: 1150 [Urine:1150]  Last three shifts:  07/18 1901 - 07/20 0700  In: 3458.2 [I.V.:3458.2]  Out: 3300 [Urine:3300]    Physical Exam:  General: Alert, not cooperative, eyes blinks   HEENT: NC, Atraumatic. PERRLA, anicteric sclerae. Non-rebreathing mask noted   Lungs: CTA Bilaterally. No Wheezing/Rhonchi/Rales. Heart:  Regular  rhythm,  No murmur, No Rubs, No Gallops  Abdomen: Soft, Non distended, Non tender.   +Bowel sounds,   Extremities: No c/c/e  Psych:   Calm   Neurologic:  Not follow command, Labs: Results:       Chemistry Recent Labs     07/20/21 0310 07/19/21  1726 07/19/21  0542 07/18/21  1035 07/18/21  0505   *  --  150*  --  118*     --  140  --  140   K 4.4 3.8 2.6*   < > 4.7     --  106  --  108   CO2 22  --  22  --  23   BUN 7  --  6*  --  8   CREA 0.34*  --  0.17*  --  0.27*   CA 7.8*  --  7.8*  --  7.9*   AGAP 7  --  12  --  9   BUCR 21*  --  35*  --  30*    < > = values in this interval not displayed. CBC w/Diff Recent Labs     07/20/21 0310 07/19/21  0542 07/18/21  0505   WBC 19.6* 20.0* 18.9*   RBC 3.18* 2.83* 3.71*   HGB 8.6* 7.9* 10.1*   HCT 28.6* 25.0* 33.4*    344 323   GRANS 85* 88* 84*   LYMPH 9* 4* 3*   EOS 2 2 3      Cardiac Enzymes No results for input(s): CPK, CKND1, JONAH in the last 72 hours. No lab exists for component: CKRMB, TROIP   Coagulation No results for input(s): PTP, INR, APTT, INREXT, INREXT in the last 72 hours. Lipid Panel No results found for: CHOL, CHOLPOCT, CHOLX, CHLST, CHOLV, 932267, HDL, HDLP, LDL, LDLC, DLDLP, 672346, VLDLC, VLDL, TGLX, TRIGL, TRIGP, TGLPOCT, CHHD, CHHDX   BNP No results for input(s): BNPP in the last 72 hours. Liver Enzymes No results for input(s): TP, ALB, TBIL, AP in the last 72 hours. No lab exists for component: SGOT, GPT, DBIL   Thyroid Studies No results found for: T4, T3U, TSH, TSHEXT, TSHEXT     Procedures/imaging: see electronic medical records for all procedures/Xrays and details which were not copied into this note but were reviewed prior to creation of Plan    CT HEAD WO CONT    Result Date: 7/14/2021  EXAM: CT head INDICATION: Altered mental status COMPARISON: None. TECHNIQUE: Axial CT imaging of the head was performed without intravenous contrast.One or more dose reduction techniques were used on this CT: automated exposure control, adjustment of the mAs and/or kVp according to patient size, and iterative reconstruction techniques.   The specific techniques used on this CT exam have been documented in the patient's electronic medical record. Digital Imaging and Communications in Medicine (DICOM) format image data are available to nonaffiliated external healthcare facilities or entities on a secure, media free, reciprocally searchable basis with patient authorization for at least a 12 month period after this study. _______________ FINDINGS: BRAIN AND POSTERIOR FOSSA: The sulci, folia, ventricles and basal cisterns are within normal limits for the patient's age. There is no intracranial hemorrhage, mass effect, or midline shift. There are areas of decreased attenuation within the periventricular and subcortical white matter. EXTRA-AXIAL SPACES AND MENINGES: There are no abnormal extra-axial fluid collections. CALVARIUM: Intact. SINUSES: Clear. OTHER: None. _______________     No acute intracranial findings    CT CHEST ABD PELV W CONT    Result Date: 7/14/2021  EXAM: CT of the chest, abdomen, and pelvis INDICATION: Sepsis pna, nausea and vomiting (requested by admitting team). COMPARISON: None. TECHNIQUE: Axial CT imaging of the chest, abdomen, and pelvis was performed with intravenous contrast. Multiplanar reformats were generated. One or more dose reduction techniques were used on this CT: automated exposure control, adjustment of the mAs and/or kVp according to patient size, and iterative reconstruction techniques. The specific techniques used on this CT exam have been documented in the patient's electronic medical record. Digital Imaging and Communications in Medicine (DICOM) format image data are available to nonaffiliated external healthcare facilities or entities on a secure, media free, reciprocally searchable basis with patient authorization for at least a 12 month period after this study. _______________ FINDINGS: CHEST: LUNGS: No suspicious nodule or mass. There is multifocal bilateral consolidation. This is more confluent within the right upper and lower lobes.  PLEURA: Normal, with no effusion or pneumothorax. AIRWAY: Normal. MEDIASTINUM: Normal heart size. No pericardial effusion. Given the limitations of cardiac motion, great vessels are unremarkable. LYMPH NODES: No enlarged lymph nodes. =============== ABDOMEN/PELVIS: LIVER, BILIARY: Liver attenuation is decreased suggestive of steatosis. No biliary dilation. Gallbladder is unremarkable. PANCREAS: Normal. SPLEEN: Normal. ADRENALS: Normal. KIDNEYS: Normal. LYMPH NODES: No enlarged lymph nodes. GASTROINTESTINAL TRACT: No bowel dilation or wall thickening. PELVIC ORGANS: Fibroid uterus. VASCULATURE: Unremarkable. BONES: No acute or aggressive osseous abnormalities identified. OTHER: None. _______________     1. Multilobar pneumonia 2. No significant findings within the abdomen or pelvis 3. Fibroid uterus    XR CHEST PORT    Result Date: 7/14/2021  EXAM: XR CHEST PORT CLINICAL INDICATION/HISTORY: respiratory failure -Additional: None COMPARISON: None TECHNIQUE: Portable frontal view of the chest _______________ FINDINGS: SUPPORT DEVICES: None. HEART AND MEDIASTINUM: Cardiomediastinal silhouette within normal limits. LUNGS AND PLEURAL SPACES: Bilateral patchy parenchymal/interstitial opacities, right greater left. No large effusion or pneumothorax. _______________     Bilateral patchy parenchymal/interstitial opacities, right greater left. May reflect atypical infection.       Conrado Knowles MD

## 2021-07-20 NOTE — PROGRESS NOTES
Christine [facility] [address]: [phone] ([phone])  Formerly [facility]: [phone]     Patient Name: Micaela Juarez  YOB: 1960    Date of Follow-up Visit: 7/20/2021`   Reason for Consult: Care decisions  Requesting Provider: Dr. Yordy Laughlin   Primary Care Physician: Caro Hawkins MD      SUMMARY:   Micaela Juarez is a 64 y.o. female with a past history of MS, dementia, A. fib, decubitus ulcer and bed bound, who was admitted on 7/14/2021 from home with a diagnosis of sepsis. Current medical issues leading to Palliative Medicine involvement include: Thin, frail 79-year-old bed bound female with a history of multiple sclerosis and multiple other comorbid conditions presented to the ER with mental status change and respiratory distress. Palliative medicine is consulted for care decisions. CHIEF COMPLAINT: Mental status change and respiratory distress    HPI/SUBJECTIVE:    Past history as above. Ms. Araceli Carver is a very debilitated, thin, frail, bed bound 79-year-old female with multiple sclerosis who presented to the ER with mental status change and respiratory distress. Per daughter and spouse, patient had vomited at home and then developed respiratory difficulty and became less responsive than normal.  Patient was found to be hypoxic and febrile to 104 in the ER. Patient is followed by home palliative through [facility]. 7/15/2021 Back on high flow this am. Appears to have increased WOB. Spoke with daughter Cait Hay. Continue current treatment. They would like to see if see has any improvement with IVAB. 7/20/2021:  Remains on HFNC. Appears tachypneic. Moved head slightly and opened eyes in response to name call. Plan for family meeting and transition to comfort measures today at 1500.       The patient is:   [] Verbal and participatory  [x] Non-participatory due to: Nonverbal    GOALS OF CARE: DNR/DNI  Patient/Health Care Proxy Stated Goals: Prolong life      TREATMENT PREFERENCES:   Code Status: DNR/DNI         PALLIATIVE DIAGNOSES:   1. Encounter for palliative care/goals of care  2. Sepsis  3. Seizure  4. Debility       PLAN:   7/20/2021:  Seen at bedside along with Ms. Melisa Rodas RN. Patient is lying in bed with high flow nasal cannula at 40 L in place. Opened eyes and moved head slightly in response to name call and light touch. Family meeting held yesterday, 7/19/2021, with palliative team, ICU attending (appreciate assistance) and patient's spouse, daughter Sruthi Lima, son Raymundo Naqvi and other daughter (name unknown) via telephone. Patient's medical condition reviewed and discussed options including transition to comfort measures versus hospice. Family decided to transition patient to comfort measures on 7/20/2021 at 1500. Addendum @3911:  Met with family, spouse Billie Savage, daughter, Sruthi Lima and her spouse, daughter, Alberto Reyes and son, Raymundo Naqvi. Family has elected to transition patient to comfort measures and family wishes to be present with patient during the transition. Discussed comfort measures in detail including transition from HFNC to nasal oxygen and use of medications such as Morphine and Ativan to manage symptoms of shortness of breath, breathlessness, pain and anxiety. Family expressed understanding and all questions answered to the best of our ability. Comfort order set placed. Physician order for scope of treatment (POST) form signed by spouse for DNR/DNI, comfort measures and no feeding tube. Copy placed on chart for scanning. Palliative medicine will continue to follow. Please see below for previous conversations with the palliative medicine team:      1. 7/16/2021 goals of care Ms Davion Weston seen along with Ms Veto Springer RN. She did not open her eyes to her name or light touch for us this am. Back on high flow due to sats in high 80's and increased WOB. Very frail and thin appearing.  Spoke with her daughter Jyoti Obando via phone. Jyoti Obando helps her father with medical decisions and understanding overall medical condition. She tells us they do wish for patient to return home with hospice services as they understand her MS is at end stage. Care decisions discussed at some length, including continuing same treatment, IVAB, weaning high flow as able in hopes her acute illness will improve and allow her to return home a bit stronger vs moving to comfort now with quick transition home with hospice, understanding high flow would need to be weaned. Yelena shared her father remains hopeful Stephanie Vasquez will be able to be treated for the acute illness and feels she is improving a bit. At this time they wish to continue treatment. We shared with Jyoti Obando it is possible despite current aggressive treatment Stephanie Hurt will continue decline, if this happens recommended to Jyoti Obando to consider and discuss  with her father to move Stephanie Vasquez to comfort measures. Yelena voiced her understanding. We offered support to Jyoti Obando in this most difficult time. She is aware due to increased WOB and lower oxygen sats her mother was placed back on high flow. Current goals of care DNR/DNI limited interventions with continuation of current treatment. ( please see below for previous notes per palliative team)     2. Encounter for palliative care/goals of care - een at bedside along with Ms. Carmen Espinosa. Ms. Bernard Escalera is lying in bed with her eyes open and nonrebreather mask in place. Patient seems to blink in response to questions and verbal stimuli, nonverbal.  Appears comfortable, no facial grimacing observed. Follow-up conversation today at [phone] with daughter, Karen Díaz in ICU waiting room. She shared that family is not yet ready to make any decision with regard to comfort measures however they are considering the option of home hospice. Hospice consult placed on 7/14/2021. Family awaiting contact.   Yelena shared that she will be talking with her father (patient's ) this evening about the option of home hospice for patient. Supportive listening provided as Maggie Mason shared information about her mother. Palliative medicine contact information given to Maggie Mason who expressed appreciation for the follow-up discussion and information. Goals of care are DNR/DNI. Palliative medicine will continue to follow and support family as they make healthcare decisions for patient. 3. Sepsis -primary team managing, IV antibiotics    4. Seizure -primary team managing, neuro consult, head CT and EEG pending, IV Keppra and as needed Ativan    5. Debility - PPS 30 which indicates an overall poor prognosis, patient lives at home with her  and has a caregiver that comes in to assist daily. Patient is bed bound and wheelchair-bound at home. Followed by home palliative with [facility]. 6. Initial consult note routed to primary continuity provider    7. Communicated plan of care with: Palliative IDT, daughter      Advance Care Planning:  [] The [facility] Interdisciplinary Team has updated the ACP Navigator with [address] and Patient Capacity    Primary Decision [facility] ([address]):   Primary Decision Maker: Elizabeth Benton - Spouse - [phone]    Primary Decision Maker: Shannan Pena - Daughter - [phone]    Medical Interventions: Limited additional interventions         As far as possible, the palliative care team has discussed with patient / health care proxy about goals of care / treatment preferences for patient.          HISTORY:     History obtained from: Chart and daughterYelena Das    Principal Problem:    Acute respiratory failure with hypoxia (HCC) ()    Active Problems:    Sepsis ([address] (7/14/2021)      MS (multiple sclerosis) (HCC) ()      Seizure (HCC) ()      Pressure injury of sacral region, stage 4 (HCC) ()      Elevated troponin ()      Lactic acidosis ()      Bedridden (7/14/2021) Pneumonia (7/14/2021)      Cachexia ([facility] (7/14/2021)      Severe protein-calorie malnutrition ([facility] (7/15/2021)      Hypokalemia (7/15/2021)      Encounter for palliative care ()      Debility ()      Past Medical History:   Diagnosis Date    MS (multiple sclerosis) ([facility]       No past surgical history on file. No family history on file. History reviewed, no pertinent family history.   Social History     Tobacco Use    Smoking status: Not on file   Substance Use Topics    Alcohol use: Not on file     Allergies   Allergen Reactions    Pcn [Penicillins] Unknown (comments)      Current Facility-Administered Medications   Medication Dose Route Frequency    fentaNYL citrate (PF) injection 12.5 mcg  12.5 mcg IntraVENous Q4H PRN    dextrose 5% - 0.45% NaCl with KCl 20 mEq/L infusion  50 mL/hr IntraVENous CONTINUOUS    vancomycin (VANCOCIN) 750 mg in 0.9% sodium chloride 250 mL (VIAL-MATE)  750 mg IntraVENous Q12H    dilTIAZem (CARDIZEM) 125 mg in dextrose 5% 125 mL infusion  0-15 mg/hr IntraVENous TITRATE    metroNIDAZOLE (FLAGYL) IVPB premix 500 mg  500 mg IntraVENous Q12H    cloNIDine (CATAPRES) 0.1 mg/24 hr patch 1 Patch  1 Patch TransDERmal Q7D    hydrALAZINE (APRESOLINE) 20 mg/mL injection 10 mg  10 mg IntraVENous Q6H PRN    baclofen intrathecal soln (Patient Supplied)  6.25 mcg/hr Intrathecal CONTINUOUS    enoxaparin (LOVENOX) injection 30 mg  30 mg SubCUTAneous Q24H    glucose chewable tablet 16 g  4 Tablet Oral PRN    glucagon (GLUCAGEN) injection 1 mg  1 mg IntraMUSCular PRN    dextrose (D50W) injection syrg 12.5-25 g  25-50 mL IntraVENous PRN    insulin lispro (HUMALOG) injection   SubCUTAneous Q6H    metoprolol (LOPRESSOR) injection 5 mg  5 mg IntraVENous Q6H    sodium chloride (NS) flush 5-40 mL  5-40 mL IntraVENous Q8H    sodium chloride (NS) flush 5-40 mL  5-40 mL IntraVENous PRN    acetaminophen (TYLENOL) tablet 650 mg  650 mg Oral Q6H PRN    Or    acetaminophen (TYLENOL) suppository 650 mg  650 mg Rectal Q6H PRN    polyethylene glycol (MIRALAX) packet 17 g  17 g Oral DAILY PRN    ondansetron (ZOFRAN ODT) tablet 4 mg  4 mg Oral Q8H PRN    Or    ondansetron (ZOFRAN) injection 4 mg  4 mg IntraVENous Q6H PRN    ELECTROLYTE REPLACEMENT PROTOCOL - Potassium Standard Dosing   1 Each Other PRN    ELECTROLYTE REPLACEMENT PROTOCOL - Magnesium   1 Each Other PRN    ELECTROLYTE REPLACEMENT PROTOCOL - Phosphorus  Standard Dosing  1 Each Other PRN    ELECTROLYTE REPLACEMENT PROTOCOL - Calcium   1 Each Other PRN    levETIRAcetam (KEPPRA) 1,000 mg in 0.9% sodium chloride 100 mL IVPB  1,000 mg IntraVENous Q12H    cefepime (MAXIPIME) 2 g in sterile water (preservative free) 10 mL IV syringe  2 g IntraVENous Q12H    pantoprazole (PROTONIX) 40 mg in 0.9% sodium chloride 10 mL injection  40 mg IntraVENous DAILY    albuterol-ipratropium (DUO-NEB) 2.5 MG-0.5 MG/3 ML  3 mL Nebulization Q4H PRN    Vancomycin - Pharmacy to dose   1 Each Other Rx Dosing/Monitoring          Clinical Pain Assessment (nonverbal scale for nonverbal patients): Clinical Pain Assessment  Severity: 0     Activity (Movement): Lying quietly, normal position    Duration: for how long has pt been experiencing pain (e.g., 2 days, 1 month, years)  Frequency: how often pain is an issue (e.g., several times per day, once every few days, constant)     FUNCTIONAL ASSESSMENT:     Palliative Performance Scale (PPS):  PPS: 30    ECOG  ECOG Status : Completely disabled     PSYCHOSOCIAL/SPIRITUAL SCREENING:      Any spiritual / Moravian concerns: 7/15/2021unable to assess, 7/16 not able to assess, 7/20 not able to assess   [] Yes /  [] No    Caregiver Burnout:  [] Yes /  [x] No /  [] No Caregiver Present      Anticipatory grief assessment: 7/15/2021unable to assess, 7/16/2021 not able to assess, 7/20 not able to assess  [] Normal  / [] Maladaptive        REVIEW OF SYSTEMS:     Systems: constitutional, ears/nose/mouth/throat, respiratory, gastrointestinal, genitourinary, musculoskeletal, integumentary, neurologic, psychiatric, endocrine. Positive findings noted below. Modified ESAS Completed by: provider           Pain: 0           Dyspnea: 2               Positive and pertinent negative findings in ROS are noted above in HPI. The following systems were [] reviewed / [x] unable to be reviewed as noted in HPI  Other findings are noted below. PHYSICAL EXAM:     Constitutional: frail appearing female who is lying in bed, appears to be in mild distress due to tachypnea, on HFNC at 40 L  Eyes: closed, opened in response to verbal stimuli   Cardiovascular: tachycardic rhythm   Respiratory: HFNC at 40 L, + tachypnea   Last bowel movement: None recorded  Skin: warm, dry  Neurologic: opened eyes in response to name call       Other: Wt Readings from Last 3 Encounters:   07/19/21 37 kg (81 lb 9.1 oz)     Blood pressure (!) 133/54, pulse (!) 112, temperature 99.4 °F (37.4 °C), resp. rate 25, height 5' 3\" (1.6 m), weight 37 kg (81 lb 9.1 oz), SpO2 92 %. Pain:  Pain Scale 1: FLACC                         LAB AND IMAGING FINDINGS:     Lab Results   Component Value Date/Time    WBC 19.6 (H) 07/20/2021 03:10 AM    HGB 8.6 (L) 07/20/2021 03:10 AM    PLATELET 111 05/89/8104 03:10 AM     Lab Results   Component Value Date/Time    Sodium 140 07/20/2021 03:10 AM    Potassium 4.4 07/20/2021 03:10 AM    Chloride 111 07/20/2021 03:10 AM    CO2 22 07/20/2021 03:10 AM    BUN 7 07/20/2021 03:10 AM    Creatinine 0.34 (L) 07/20/2021 03:10 AM    Calcium 7.8 (L) 07/20/2021 03:10 AM    Magnesium 1.9 07/20/2021 03:10 AM    Phosphorus 1.8 (L) 07/20/2021 03:10 AM      Lab Results   Component Value Date/Time    Alk.  phosphatase 115 07/14/2021 03:35 PM    Protein, total 9.5 (H) 07/14/2021 03:35 PM    Albumin 2.7 (L) 07/14/2021 03:35 PM    Globulin 6.8 (H) 07/14/2021 03:35 PM     Lab Results   Component Value Date/Time    INR 1.3 (H) 07/14/2021 03:35 PM    Prothrombin time 16.0 (H) 07/14/2021 03:35 PM      No results found for: IRON, FE, TIBC, IBCT, PSAT, FERR   No results found for: PH, PCO2, PO2  No components found for: [place]   Lab Results   Component Value Date/Time    CK 37 07/15/2021 09:30 AM    CK - MB 2.7 07/15/2021 09:30 AM              Total time:  65 minutes     > 50% counseling / coordination:  Time spent in direct consultation with the patient, medical team, and family     Prolonged service was provided for  [x]30 min   []75 min in face to face time in the presence of the patient, spent as noted above. Time Start: 1530  Time End:  1635    Disclaimer: Sections of this note are dictated using utilizing voice recognition software, which may have resulted in some phonetic based errors in grammar and contents. Even though attempts were made to correct all the mistakes, some may have been missed, and remained in the body of the document. If questions arise, please contact our department.

## 2021-07-21 NOTE — PROGRESS NOTES
Bedside and Verbal shift change report given to LEIDA Alberto (oncoming nurse) by  Davene Bence RN (offgoing nurse).  Report included the following information SBAR, Kardex, Intake/Output, MAR, Recent Results

## 2021-07-21 NOTE — PALLIATIVE CARE
Patient experiencing increased tachypnea and secretions, medications adjusted. Increased Morphine to 1mg q 15 min PRN  Added Scopolamine patch  Added Robinul IV PRN    Discussed with bedside nurse, Fan Smith RN.     Nadia Carbajal, [facility]  Palliative Medicine

## 2021-07-21 NOTE — PROGRESS NOTES
Problem: Pressure Injury - Risk of  Goal: *Prevention of pressure injury  Description: Document Alvin Scale and appropriate interventions in the flowsheet. Outcome: Progressing Towards Goal  Note: Pressure Injury Interventions:  Sensory Interventions: Assess changes in LOC, Keep linens dry and wrinkle-free, Minimize linen layers, Monitor skin under medical devices, Pressure redistribution bed/mattress (bed type), Turn and reposition approx. every two hours (pillows and wedges if needed), Float heels    Moisture Interventions: Absorbent underpads, Apply protective barrier, creams and emollients, Check for incontinence Q2 hours and as needed, Maintain skin hydration (lotion/cream), Minimize layers, Offer toileting Q_hr, Moisture barrier    Activity Interventions: Pressure redistribution bed/mattress(bed type)    Mobility Interventions: Pressure redistribution bed/mattress (bed type), Turn and reposition approx. every two hours(pillow and wedges), Float heels    Nutrition Interventions: Document food/fluid/supplement intake    Friction and Shear Interventions: Lift sheet, Minimize layers                Problem: Falls - Risk of  Goal: *Absence of Falls  Description: Document Lanette Fall Risk and appropriate interventions in the flowsheet.   Outcome: Progressing Towards Goal  Note: Fall Risk Interventions:       Mentation Interventions: Adequate sleep, hydration, pain control, Door open when patient unattended, Bed/chair exit alarm, Evaluate medications/consider consulting pharmacy, More frequent rounding, Reorient patient, Room close to nurse's station, Toileting rounds, Update white board    Medication Interventions: Bed/chair exit alarm, Evaluate medications/consider consulting pharmacy    Elimination Interventions: Bed/chair exit alarm, Call light in reach, Toileting schedule/hourly rounds

## 2021-07-21 NOTE — PROGRESS NOTES
[id number] [facility]: [phone] ([phone])  NIXON TABARES [facility]: [phone]     Patient Name: Izabela Padgett  YOB: 1960    Date of Follow-up Visit: 7/21/2021`   Reason for Consult: Care decisions  Requesting Provider: Dr. Linda Tang   Primary Care Physician: Deanna River MD      SUMMARY:   Izabela Padgett is a 64 y.o. female with a past history of MS, dementia, A. fib, decubitus ulcer and bed bound, who was admitted on 7/14/2021 from home with a diagnosis of sepsis. Current medical issues leading to Palliative Medicine involvement include: Thin, frail 59-year-old bed bound female with a history of multiple sclerosis and multiple other comorbid conditions presented to the ER with mental status change and respiratory distress. Palliative medicine is consulted for care decisions. CHIEF COMPLAINT: Mental status change and respiratory distress    HPI/SUBJECTIVE:    Past history as above. Ms. Kadeem Le is a very debilitated, thin, frail, bed bound 59-year-old female with multiple sclerosis who presented to the ER with mental status change and respiratory distress. Per daughter and spouse, patient had vomited at home and then developed respiratory difficulty and became less responsive than normal.  Patient was found to be hypoxic and febrile to 104 in the ER. Patient is followed by home palliative through [facility]. 7/15/2021 Back on high flow this am. Appears to have increased WOB. Spoke with daughter Charla Everett. Continue current treatment. They would like to see if see has any improvement with IVAB. 7/20/2021:  Remains on HFNC. Appears tachypneic. Moved head slightly and opened eyes in response to name call. Plan for family meeting and transition to comfort measures today at 1500.    7/21/2021:  Lying in bed, not alert or responsive to name or light touch. Nasal oxygen @ 2 liters in place. Slight gurgling/upper airway noises heard.   Remains on comfort measures. The patient is:   [] Verbal and participatory  [x] Non-participatory due to: Not responsive    GOALS OF CARE: DNR/DNI, comfort measures, no feeding tube  Patient/Health Care Proxy Stated Goals: Comfort      TREATMENT PREFERENCES:   Code Status: DNR/DNI         PALLIATIVE DIAGNOSES:   1. Encounter for palliative care/goals of care  2. Sepsis  3. Seizure  4. Debility       PLAN:   7/21/2021: Seen at bedside along with Ms. Jerry Romero. Patient is lying in bed, not alert, not responsive. No family at bedside. Nasal oxygen at 2 L in place. Slight gurgling and upper airway noises heard. Remains on comfort measures. Patient does not appear entirely comfortable. Mild dyspnea noted. Discussed with bedside RN and requested dose of Morphine and Levsin be given for comfort and secretions. POST form on file. Goals of care are DNR/DNI, comfort measures, no feeding tube. Symptom management: MAR reviewed  Morphine 0.5 mg IV x5 doses in last 24 hours  Ativan 0.5 mg IV x4 doses in last 24 hours    Please see below for previous conversations with the palliative medicine team:    7/20/2021:  Seen at bedside along with Ms. Jenetta Hodgkins, RN. Patient is lying in bed with high flow nasal cannula at 40 L in place. Opened eyes and moved head slightly in response to name call and light touch. Family meeting held yesterday, 7/19/2021, with palliative team, ICU attending (appreciate assistance) and patient's spouse, daughter Bon Larose, son Saritha Rangel and other daughter (name unknown) via telephone. Patient's medical condition reviewed and discussed options including transition to comfort measures versus hospice. Family decided to transition patient to comfort measures on 7/20/2021 at 1500. Addendum @9255:  Met with family, spouse Lilliana Chauhan, daughter, Bon Larose and her spouse, daughter, Yola Dover and son, Saritha Rangel.   Family has elected to transition patient to comfort measures and family wishes to be present with patient during the transition. Discussed comfort measures in detail including transition from HFNC to nasal oxygen and use of medications such as Morphine and Ativan to manage symptoms of shortness of breath, breathlessness, pain and anxiety. Family expressed understanding and all questions answered to the best of our ability. Comfort order set placed. Physician order for scope of treatment (POST) form signed by spouse for DNR/DNI, comfort measures and no feeding tube. Copy placed on chart for scanning. Palliative medicine will continue to follow. 1. 7/16/2021 goals of care Ms Sheldon Matthews seen along with Ms Dilan Jeffers. She did not open her eyes to her name or light touch for us this am. Back on high flow due to sats in high 80's and increased WOB. Very frail and thin appearing. Spoke with her daughter Austyn Rich via phone. Austyn Layla helps her father with medical decisions and understanding overall medical condition. She tells us they do wish for patient to return home with hospice services as they understand her MS is at end stage. Care decisions discussed at some length, including continuing same treatment, IVAB, weaning high flow as able in hopes her acute illness will improve and allow her to return home a bit stronger vs moving to comfort now with quick transition home with hospice, understanding high flow would need to be weaned. Yelena shared her father remains hopeful Nemesoi Sherman will be able to be treated for the acute illness and feels she is improving a bit. At this time they wish to continue treatment. We shared with Austyn Rich it is possible despite current aggressive treatment Nemesio Sherman will continue decline, if this happens recommended to Austyn Rich to consider and discuss  with her father to move Nemesio Sherman to comfort measures. Yelena voiced her understanding. We offered support to Austyn Rich in this most difficult time.  She is aware due to increased WOB and lower oxygen sats her mother was placed back on high flow. Current goals of care DNR/DNI limited interventions with continuation of current treatment. ( please see below for previous notes per palliative team)     2. Encounter for palliative care/goals of care - een at bedside along with Ms. Vermell Gaucher. Ms. Farooq Aranda is lying in bed with her eyes open and nonrebreather mask in place. Patient seems to blink in response to questions and verbal stimuli, nonverbal.  Appears comfortable, no facial grimacing observed. Follow-up conversation today at [phone] with daughter, Carlos Alberto Canela in ICU waiting room. She shared that family is not yet ready to make any decision with regard to comfort measures however they are considering the option of home hospice. Hospice consult placed on 7/14/2021. Family awaiting contact. Yelena shared that she will be talking with her father (patient's ) this evening about the option of home hospice for patient. Supportive listening provided as Key Diazmat shared information about her mother. Palliative medicine contact information given to Key Cadena who expressed appreciation for the follow-up discussion and information. Goals of care are DNR/DNI. Palliative medicine will continue to follow and support family as they make healthcare decisions for patient. 3. Sepsis -primary team managing, IV antibiotics    4. Seizure -primary team managing, neuro consult, head CT and EEG pending, IV Keppra and as needed Ativan    5. Debility - PPS 30 which indicates an overall poor prognosis, patient lives at home with her  and has a caregiver that comes in to assist daily. Patient is bed bound and wheelchair-bound at home. Followed by home palliative with [facility]. 6. Initial consult note routed to primary continuity provider    7.  Communicated plan of care with: Palliative IDT, daughter      Advance Care Planning:  [] The [facility] Interdisciplinary Team has updated the ACP Navigator with [facility] Care Agent and Patient Capacity    Primary Decision [facility] ([address]):   Primary Decision Maker: Cristi Sandhoff - Spouse - [phone]    Primary Decision Maker: Billie Baez - Daughter - [phone]    Medical Interventions: Comfort measures   Artificially Administered Nutrition: No feeding tube     As far as possible, the palliative care team has discussed with patient / health care proxy about goals of care / treatment preferences for patient. HISTORY:     History obtained from: Chart and daughterYelena Das    Principal Problem:    Acute respiratory failure with hypoxia (HCC) ()    Active Problems:    Sepsis ([address] (7/14/2021)      MS (multiple sclerosis) (HCC) ()      Seizure (HCC) ()      Pressure injury of sacral region, stage 4 (HCC) ()      Elevated troponin ()      Lactic acidosis ()      Bedridden (7/14/2021)      Pneumonia (7/14/2021)      Cachexia ([address] (7/14/2021)      Severe protein-calorie malnutrition ([address] (7/15/2021)      Hypokalemia (7/15/2021)      Encounter for palliative care ()      Debility ()      Past Medical History:   Diagnosis Date    MS (multiple sclerosis) ([facility]       No past surgical history on file. No family history on file. History reviewed, no pertinent family history.   Social History     Tobacco Use    Smoking status: Not on file   Substance Use Topics    Alcohol use: Not on file     Allergies   Allergen Reactions    Pcn [Penicillins] Unknown (comments)      Current Facility-Administered Medications   Medication Dose Route Frequency    LORazepam (ATIVAN) injection 0.5 mg  0.5 mg IntraVENous Q2H PRN    hyoscyamine SL (LEVSIN/SL) tablet 0.125 mg  0.125 mg SubLINGual Q4H PRN    bisacodyL (DULCOLAX) suppository 10 mg  10 mg Rectal DAILY PRN    morphine injection 0.5 mg  0.5 mg IntraVENous Q15MIN PRN    cloNIDine (CATAPRES) 0.1 mg/24 hr patch 1 Patch  1 Patch TransDERmal Q7D    baclofen intrathecal soln (Patient Supplied)  6.25 mcg/hr Intrathecal CONTINUOUS    sodium chloride (NS) flush 5-40 mL  5-40 mL IntraVENous Q8H    sodium chloride (NS) flush 5-40 mL  5-40 mL IntraVENous PRN    acetaminophen (TYLENOL) tablet 650 mg  650 mg Oral Q6H PRN    Or    acetaminophen (TYLENOL) suppository 650 mg  650 mg Rectal Q6H PRN    ondansetron (ZOFRAN ODT) tablet 4 mg  4 mg Oral Q8H PRN    Or    ondansetron (ZOFRAN) injection 4 mg  4 mg IntraVENous Q6H PRN    levETIRAcetam (KEPPRA) 1,000 mg in 0.9% sodium chloride 100 mL IVPB  1,000 mg IntraVENous Q12H    albuterol-ipratropium (DUO-NEB) 2.5 MG-0.5 MG/3 ML  3 mL Nebulization Q4H PRN          Clinical Pain Assessment (nonverbal scale for nonverbal patients): Clinical Pain Assessment  Severity: 0     Activity (Movement): Lying quietly, normal position    Duration: for how long has pt been experiencing pain (e.g., 2 days, 1 month, years)  Frequency: how often pain is an issue (e.g., several times per day, once every few days, constant)     FUNCTIONAL ASSESSMENT:     Palliative Performance Scale (PPS):  PPS: 10    ECOG  ECOG Status : Completely disabled     PSYCHOSOCIAL/SPIRITUAL SCREENING:      Any spiritual / Alevism concerns: 7/21/2021unable to assess  [] Yes /  [] No    Caregiver Burnout:  [] Yes /  [] No /  [x] No Caregiver Present      Anticipatory grief assessment: 7/21/2021unable to assess  [] Normal  / [] Maladaptive        REVIEW OF SYSTEMS:     Systems: constitutional, ears/nose/mouth/throat, respiratory, gastrointestinal, genitourinary, musculoskeletal, integumentary, neurologic, psychiatric, endocrine. Positive findings noted below. Modified ESAS Completed by: provider           Pain: 0           Dyspnea: 1           Stool Occurrence(s): 0   Positive and pertinent negative findings in ROS are noted above in HPI. The following systems were [] reviewed / [x] unable to be reviewed as noted in HPI  Other findings are noted below.      PHYSICAL EXAM:     Constitutional: frail appearing female lying in bed, not alert, not responsive , mildly dyspneic on nasal oxygen at 2 L, does not appear comfortable  Eyes: closed  Cardiovascular: tachycardia  Respiratory: Mild dyspnea, nasal oxygen at 2 L  Last bowel movement: None recorded  Skin: warm, dry  Neurologic: Not alert, not responsive      Other: Wt Readings from Last 3 Encounters:   07/20/21 37.2 kg (82 lb)     Blood pressure (!) 131/58, pulse (!) 115, temperature 98.1 °F (36.7 °C), resp. rate 22, height 5' 3\" (1.6 m), weight 37.2 kg (82 lb), SpO2 96 %. Pain:  Pain Scale 1: FLACC                         LAB AND IMAGING FINDINGS:     Lab Results   Component Value Date/Time    WBC 19.6 (H) 07/20/2021 03:10 AM    HGB 8.6 (L) 07/20/2021 03:10 AM    PLATELET 943 65/18/3382 03:10 AM     Lab Results   Component Value Date/Time    Sodium 140 07/20/2021 03:10 AM    Potassium 4.4 07/20/2021 03:10 AM    Chloride 111 07/20/2021 03:10 AM    CO2 22 07/20/2021 03:10 AM    BUN 7 07/20/2021 03:10 AM    Creatinine 0.34 (L) 07/20/2021 03:10 AM    Calcium 7.8 (L) 07/20/2021 03:10 AM    Magnesium 1.9 07/20/2021 03:10 AM    Phosphorus 1.8 (L) 07/20/2021 03:10 AM      Lab Results   Component Value Date/Time    Alk.  phosphatase 115 07/14/2021 03:35 PM    Protein, total 9.5 (H) 07/14/2021 03:35 PM    Albumin 2.7 (L) 07/14/2021 03:35 PM    Globulin 6.8 (H) 07/14/2021 03:35 PM     Lab Results   Component Value Date/Time    INR 1.3 (H) 07/14/2021 03:35 PM    Prothrombin time 16.0 (H) 07/14/2021 03:35 PM      No results found for: IRON, FE, TIBC, IBCT, PSAT, FERR   No results found for: PH, PCO2, PO2  No components found for: [place]   Lab Results   Component Value Date/Time    CK 37 07/15/2021 09:30 AM    CK - MB 2.7 07/15/2021 09:30 AM              Total time:  25 minutes     > 50% counseling / coordination:  Time spent in direct consultation with the patient, medical team, and family     Prolonged service was provided for  []30 min   []75 min in face to face time in the presence of the patient, spent as noted above. Time Start:   Time End:      Disclaimer: Sections of this note are dictated using utilizing voice recognition software, which may have resulted in some phonetic based errors in grammar and contents. Even though attempts were made to correct all the mistakes, some may have been missed, and remained in the body of the document. If questions arise, please contact our department.

## 2021-07-21 NOTE — PROGRESS NOTES
Bedside and verbal shift change report recieved from Renee Hebert RN  (offgoing nurse) given to Paula Mauricio RN (oncoming nurse). Report included the following information SBAR, Kardex, Intake/Output, MAR and Recent Results       4557- Patient resting quietly abed at this time, resp labored. Received Levsin/SL r/t increased secretions, effective. Daughter at bedside. HOB elevated 30 degrees. Bedside and verbal shift change report given to Christina Anthony RN (oncoming nurse) by Whitney Gill RN (offgoing nurse).  Report included the following information SBAR, Kardex, Intake/Output, MAR and Recent Results

## 2021-07-21 NOTE — PROGRESS NOTES
In to visit with patient and complete required documentation. Due to patient's condition, unable to complete. DNR status present.

## 2021-07-21 NOTE — PROGRESS NOTES
Palliative Medicine    Palliative Medicine team Heidi Lemus NP and Kal Jean RN rounded on comfort care patient. Pt was lying in bed with her eyes closed. She had some gurgling d/t secretions at the back of her throat. Pt's daughter was present at the bedside. Pt did not appear to be in any distress. She did not exhibit any s/s of pain or SOB. Asked RN to please administer PRN med for secretions. Pt remains DNR/DNI on comfort measures. Thank you for the Palliative Medicine consult and allowing us to participate in the care of Mrs. Farooq Aranda. Will continue to monitor and provide support.     Kal Jean RN, BSN  Palliative Medicine Inpatient RN  [facility]  Palliative COPE Line: [phone]

## 2021-07-21 NOTE — PROGRESS NOTES
Hospitalist Progress Note-critical care note     Patient: Haresh Stein MRN: [id number]  [facility]: [id number]    YOB: 1960  Age: 64 y.o. Sex: female    DOA: 7/14/2021 LOS:  LOS: 7 days            Chief complaint: Acute respiratory failure sepsis seizure elevated troponin fell riding pneumonia.   MS, hypokalemia    Assessment/Plan         Hospital Problems  Never Reviewed        Codes Class Noted POA    Severe protein-calorie malnutrition ([facility] ICD-10-CM: E43  ICD-9-CM: 262  7/15/2021 Unknown        Hypokalemia ICD-10-CM: E87.6  ICD-9-CM: 276.8  7/15/2021 Unknown        Encounter for palliative care ICD-10-CM: Z51.5  ICD-9-CM: V66.7  Unknown Unknown        Debility ICD-10-CM: R53.81  ICD-9-CM: 799.3  Unknown Unknown        Sepsis ([facility] ICD-10-CM: A41.9  ICD-9-CM: 038.9, 995.91  7/14/2021         MS (multiple sclerosis) ([facility] ICD-10-CM: G35  ICD-9-CM: 340  Unknown Unknown        Seizure ([facility] ICD-10-CM: R56.9  ICD-9-CM: 780.39  Unknown Unknown        Pressure injury of sacral region, stage 4 (HCC) ICD-10-CM: L89.154  ICD-9-CM: 707.03, 707.24  Unknown Unknown        * (Principal) Acute respiratory failure with hypoxia ([facility] ICD-10-CM: J96.01  ICD-9-CM: 518.81  Unknown Unknown        Elevated troponin ICD-10-CM: R77.8  ICD-9-CM: 790.6  Unknown Unknown        Lactic acidosis ICD-10-CM: E87.2  ICD-9-CM: 276.2  Unknown Unknown        Bedridden ICD-10-CM: Z74.01  ICD-9-CM: V49.84  7/14/2021 Unknown        Pneumonia ICD-10-CM: J18.9  ICD-9-CM: 288  7/14/2021 Unknown        Cachexia ([facility] ICD-10-CM: I47  ICD-9-CM: 799.4  7/14/2021 Unknown             pt was admitted to the hospital due to acute respiratory failure  And aspiration pna,   Not respond to treatment, family decide to transient to comfort care    acute respiratory failure with hypoxia  on high flow-now nc O2   Due to aspiration pna    Rapid covid 19 negative      Pna : due to aspiration   Was on cefepime and vanc and flagyl -abx hold due to comfort Patient is asking if her lab work has come back yet.    care   Breathing tx as needed       Aspiration pneumonia, decubitus ulcer    Elevated troponin:  ce trending down, echo:ef wnl   Dr. Jair Sanz was  f/u      Lactic acidosis: resolved   Hyponatremia :resolved   Hypokalemia  K replacement         seizures: Received Ativan, Keppra -ativan as needed   EEG showed no active seizure but left hemisphere intermittent sharp waves, which is consistent with history of epilepsy  Can not do mri   CT head no acute process     MS: Bedridden almost 1 year. Baseline: Sometimes can recognize family member          Derm: Decubitus ulcer, lesion on ankle  Wound care.     Cachexia :     Pt looks comfortable     CM on board   Review of systems:  Unable to obtain due to pt condition   Vital signs/Intake and Output:  Visit Vitals  BP (!) 131/58   Pulse (!) 115   Temp 98.1 °F (36.7 °C)   Resp 22   Ht 5' 3\" (1.6 m)   Wt 37.2 kg (82 lb)   SpO2 96%   BMI 14.53 kg/m²     Current Shift:  No intake/output data recorded. Last three shifts:  07/19 1901 - 07/21 0700  In: 2170 [I.V.:2170]  Out: 2200 [Urine:2200]    Physical Exam:  General: Respond to pain stimuli , eyes half open   HEENT: NC, Atraumatic. PERRLA, anicteric sclerae. Lungs: +Rhonchi/Rales. Heart:  Regular  rhythm,  No murmur, No Rubs, No Gallops  Abdomen: Soft, Non distended, Non tender. +Bowel sounds,   Extremities: No c/c/e  Psych:   Calm   Neurologic:  Respond to pain stimuli           Labs: Results:       Chemistry Recent Labs     07/20/21  0310 07/19/21  1726 07/19/21  0542   *  --  150*     --  140   K 4.4 3.8 2.6*     --  106   CO2 22  --  22   BUN 7  --  6*   CREA 0.34*  --  0.17*   CA 7.8*  --  7.8*   AGAP 7  --  12   BUCR 21*  --  35*      CBC w/Diff Recent Labs     07/20/21  0310 07/19/21  0542   WBC 19.6* 20.0*   RBC 3.18* 2.83*   HGB 8.6* 7.9*   HCT 28.6* 25.0*    344   GRANS 85* 88*   LYMPH 9* 4*   EOS 2 2      Cardiac Enzymes No results for input(s): CPK, CKND1, JONAH in the last 72 hours.     No lab exists for component: CKRMB, TROIP   Coagulation No results for input(s): PTP, INR, APTT, INREXT, INREXT in the last 72 hours. Lipid Panel No results found for: CHOL, CHOLPOCT, CHOLX, CHLST, CHOLV, 923586, HDL, HDLP, LDL, LDLC, DLDLP, 596539, VLDLC, VLDL, TGLX, TRIGL, TRIGP, TGLPOCT, CHHD, CHHDX   BNP No results for input(s): BNPP in the last 72 hours. Liver Enzymes No results for input(s): TP, ALB, TBIL, AP in the last 72 hours. No lab exists for component: SGOT, GPT, DBIL   Thyroid Studies No results found for: T4, T3U, TSH, TSHEXT, TSHEXT     Procedures/imaging: see electronic medical records for all procedures/Xrays and details which were not copied into this note but were reviewed prior to creation of Plan    CT HEAD WO CONT    Result Date: 7/14/2021  EXAM: CT head INDICATION: Altered mental status COMPARISON: None. TECHNIQUE: Axial CT imaging of the head was performed without intravenous contrast.One or more dose reduction techniques were used on this CT: automated exposure control, adjustment of the mAs and/or kVp according to patient size, and iterative reconstruction techniques. The specific techniques used on this CT exam have been documented in the patient's electronic medical record. Digital Imaging and Communications in Medicine (DICOM) format image data are available to nonaffiliated external healthcare facilities or entities on a secure, media free, reciprocally searchable basis with patient authorization for at least a 12 month period after this study. _______________ FINDINGS: BRAIN AND POSTERIOR FOSSA: The sulci, folia, ventricles and basal cisterns are within normal limits for the patient's age. There is no intracranial hemorrhage, mass effect, or midline shift. There are areas of decreased attenuation within the periventricular and subcortical white matter. EXTRA-AXIAL SPACES AND MENINGES: There are no abnormal extra-axial fluid collections. CALVARIUM: Intact. SINUSES: Clear.  OTHER: None. _______________     No acute intracranial findings    CT CHEST ABD PELV W CONT    Result Date: 7/14/2021  EXAM: CT of the chest, abdomen, and pelvis INDICATION: Sepsis pna, nausea and vomiting (requested by admitting team). COMPARISON: None. TECHNIQUE: Axial CT imaging of the chest, abdomen, and pelvis was performed with intravenous contrast. Multiplanar reformats were generated. One or more dose reduction techniques were used on this CT: automated exposure control, adjustment of the mAs and/or kVp according to patient size, and iterative reconstruction techniques. The specific techniques used on this CT exam have been documented in the patient's electronic medical record. Digital Imaging and Communications in Medicine (DICOM) format image data are available to nonaffiliated external healthcare facilities or entities on a secure, media free, reciprocally searchable basis with patient authorization for at least a 12 month period after this study. _______________ FINDINGS: CHEST: LUNGS: No suspicious nodule or mass. There is multifocal bilateral consolidation. This is more confluent within the right upper and lower lobes. PLEURA: Normal, with no effusion or pneumothorax. AIRWAY: Normal. MEDIASTINUM: Normal heart size. No pericardial effusion. Given the limitations of cardiac motion, great vessels are unremarkable. LYMPH NODES: No enlarged lymph nodes. =============== ABDOMEN/PELVIS: LIVER, BILIARY: Liver attenuation is decreased suggestive of steatosis. No biliary dilation. Gallbladder is unremarkable. PANCREAS: Normal. SPLEEN: Normal. ADRENALS: Normal. KIDNEYS: Normal. LYMPH NODES: No enlarged lymph nodes. GASTROINTESTINAL TRACT: No bowel dilation or wall thickening. PELVIC ORGANS: Fibroid uterus. VASCULATURE: Unremarkable. BONES: No acute or aggressive osseous abnormalities identified. OTHER: None. _______________     1. Multilobar pneumonia 2. No significant findings within the abdomen or pelvis 3.  Fibroid uterus    XR CHEST PORT    Result Date: 7/14/2021  EXAM: XR CHEST PORT CLINICAL INDICATION/HISTORY: respiratory failure -Additional: None COMPARISON: None TECHNIQUE: Portable frontal view of the chest _______________ FINDINGS: SUPPORT DEVICES: None. HEART AND MEDIASTINUM: Cardiomediastinal silhouette within normal limits. LUNGS AND PLEURAL SPACES: Bilateral patchy parenchymal/interstitial opacities, right greater left. No large effusion or pneumothorax. _______________     Bilateral patchy parenchymal/interstitial opacities, right greater left. May reflect atypical infection.       Marie Frank MD

## 2021-07-21 NOTE — PROGRESS NOTES
NUTRITION update    ASSESSMENT/PLAN:     Current Diet: NPO    Chart reviewed, note change in goals of care now focused on comfort measures only. Aggressive nutrition intervention is not indicated at this time. Will assist as needed; please consult if nutrition intervention is medically indicated and consistent with patient's goals of care.       Feliciano Stanton RD

## 2021-07-21 NOTE — PROGRESS NOTES
Palliative Medicine    CODE STATUS: DNR/DNI; comfort measures only; no feeding tube    AMD Status: has AMD on file naming her  and daughter as her MPOAs                7/21/2021 0825 Seen today in room 303 along with Navi El NP. No family at bedside. Respirations mid 20's without respiratory distress. Oxygen on at 2 lpm per NC. Upper airway gurgling appreciated. Minimal response to hearing her name called. Skin warm and dry. Remains tachycardic    Discussion with clinical nurse about administering morphine and levsin. Palliative care will continue to follow Eugenia Wise   and her family during her hospitalization and support them as they make healthcare decisions and define goals of care.     Aly Durand RN, MSN  Palliative Medicine  P: [phone]

## 2021-07-21 NOTE — PROGRESS NOTES
Noted pt transfer to [address] [place] [address]. Pt currently comfort care. Pt with a poor prognosis. CM remains available to assist as needed.

## 2021-07-22 NOTE — DISCHARGE SUMMARY
Called to pronounce patient. No response to noxious stimuli. No spontaneous breath sounds nor cardiac sounds. Pupils fixed and dilated. TOD: 1648. Family at bedside  Admit Date: 7/14/2021   Date of Death: 7/22/2021     Patient ID:   Ja Baez   64 y.o.   1960     Cause of Death: multiple sclerosis, seizure disorder, chachexia   Diagnosis   Patient Active Problem List    Diagnosis Date Noted    Severe protein-calorie malnutrition ([address] 07/15/2021    Hypokalemia 07/15/2021    Encounter for palliative care     Debility     Sepsis ([address] 07/14/2021    Bedridden 07/14/2021    Pneumonia 07/14/2021    Cachexia ([address] 07/14/2021    MS (multiple sclerosis) ([address]     Seizure ([address]     Pressure injury of sacral region, stage 4 ([address]     Acute respiratory failure with hypoxia (HCC)     Elevated troponin     Lactic acidosis         Hospital Course:   Ja Baez is a 64 y.o. female with MS, bedridden, dementia,afib, decubitus ulcer presented to ER due to mental status changes and respiratory distress. Per  reported: Caregiver has been working for constipation, patient has been vomited since last Monday. . She become less responsiveness after vomiting. In ER she was found hypoxia, her T was 104  in ER, she had seizure in ER. Keppra and Ativan were administrated. CT head/abdomen/pelvic: no acute process except multiple pna. Sepsis protocol was started in ER.abg done with pH 7.35   She was admitted to intensive care unit, she was seen and followed by pulmonary critical care. Acute respiratory failure with hypoxia she required high flow oxygen by nasal cannula. She received antibiotics for pneumonia likely secondary to aspiration. Received breathing treatment as needed. Her elevated troponin thought to be demand ischemia. Echocardiogram with normal left ventricular function. Her lactic acidosis resolved, hyponatremia and hypokalemia resolved. For her seizure disorder continued her Keppra. Use Ativan as needed. Received local wound care for decubitus ulcer. With her severe protein calorie material nutrition cachexia and failure to thrive palliative care met with family who have decided on comfort measures.   PCP: Lis Tirado MD

## 2021-07-22 NOTE — PROGRESS NOTES
Bedside and verbal report given to KATELYNN Collazo RN (oncoming nurse) by Nimesh Domingo RN  (off going nurse). Report included the following information SBAR, Kardex, OR Summary, Intake/Output, and MAR. PRN Ativan and Tylenol given for air hunger and discomfort. No acute changes. Bedside and verbal report given by (off going nurse) KATELYNN Collazo RN to (oncoming nurse) [facility]. Report included the following information SBAR, Kardex, OR Summary, Intake/Output, and MAR.

## 2021-07-22 NOTE — CONSULTS
Christine Moraes 1266: [phone]  [facility] ED: [phone]     Patient Name: Mis Ferguson  YOB: 1960    Date of Follow-up Visit: 7/22/2021`   Reason for Consult: Care decisions  Requesting Provider: Dr. Liban Knowles   Primary Care Physician: Jonathan Elias MD      SUMMARY:   Mis Ferguson is a 64 y.o. female with a past history of MS, dementia, A. fib, decubitus ulcer and bed bound, who was admitted on 7/14/2021 from home with a diagnosis of sepsis. Current medical issues leading to Palliative Medicine involvement include: Thin, frail 80-year-old bed bound female with a history of multiple sclerosis and multiple other comorbid conditions presented to the ER with mental status change and respiratory distress. Palliative medicine is consulted for care decisions. CHIEF COMPLAINT: Mental status change and respiratory distress    HPI/SUBJECTIVE:    Past history as above. Ms. Fabricio Sarmiento is a very debilitated, thin, frail, bed bound 80-year-old female with multiple sclerosis who presented to the ER with mental status change and respiratory distress. Per daughter and spouse, patient had vomited at home and then developed respiratory difficulty and became less responsive than normal.  Patient was found to be hypoxic and febrile to 104 in the ER. Patient is followed by home palliative through [facility]. 7/15/2021 Back on high flow this am. Appears to have increased WOB. Spoke with daughter Odalis Pretty. Continue current treatment. They would like to see if see has any improvement with IVAB. 7/20/2021:  Remains on HFNC. Appears tachypneic. Moved head slightly and opened eyes in response to name call. Plan for family meeting and transition to comfort measures today at 1500.    7/21/2021:  Lying in bed, not alert or responsive to name or light touch. Nasal oxygen @ 2 liters in place. Slight gurgling/upper airway noises heard.   Remains on comfort measures. 7/22/2021: Lying in bed, not alert, moved head slightly in response to name call. Remains on comfort measures. Nasal oxygen at 2 L in place. Respirations shallow, slight gurgling heard. The patient is:   [] Verbal and participatory  [x] Non-participatory due to: Not responsive    GOALS OF CARE: DNR/DNI, comfort measures, no feeding tube  Patient/Health Care Proxy Stated Goals: Comfort      TREATMENT PREFERENCES:   Code Status: DNR/DNI         PALLIATIVE DIAGNOSES:   1. Encounter for palliative care/goals of care  2. Sepsis  3. Seizure  4. Debility       PLAN:   7/22/2021: Seen at bedside along with Ms. Luis Navarrete RN. Patient is lying in bed, not alert, moved head slightly in response to name call. No family at bedside. Remains on comfort measures. Nasal oxygen at 2 L in place, respirations shallow and slightly tachypneic, slight gurgling heard. Does not appear entirely comfortable. Discussed with bedside RN and requested PRN dose of medications for comfort and secretions. POST form on file. Goals of care are DNR/DNI, comfort measures, no feeding tube. Symptom management: MAR reviewed - Morphine 1 mg IV x4 doses in last 24 hours, Ativan 0.5 mg IV x2 doses in last 24 hours, Levsin x3 doses in last 24 hours, Scopolamine patch in place    Scheduled Roxanol 5 mg every 6 hours  Added Roxanol 5 mg every 1 hour PRN  Added Ativan 0.5 mg every 3 hours PRN  D/C IV morphine and IV Ativan    Addendum @1415:  Rounded on patient who did not appear comfortable. Increased work of breathing observed with significant secretions. Discussed with RN and requested PRN dose of Morphine, Ativan and Levsin. Increased scheduled Roxanol to q4 hours. Please see below for previous conversations with the palliative medicine team:    7/21/2021: Seen at bedside along with Ms. Daniella Paul. Patient is lying in bed, not alert, not responsive. No family at bedside. Nasal oxygen at 2 L in place.   Slight gurgling and upper airway noises heard. Remains on comfort measures. Patient does not appear entirely comfortable. Mild dyspnea noted. Discussed with bedside RN and requested dose of Morphine and Levsin be given for comfort and secretions. POST form on file. Goals of care are DNR/DNI, comfort measures, no feeding tube. Symptom management: MAR reviewed  Morphine 0.5 mg IV x5 doses in last 24 hours  Ativan 0.5 mg IV x4 doses in last 24 hours      7/20/2021:  Seen at bedside along with Ms. Dl Green, [address] [place] [address]. Patient is lying in bed with high flow nasal cannula at 40 L in place. Opened eyes and moved head slightly in response to name call and light touch. Family meeting held yesterday, 7/19/2021, with palliative team, ICU attending (appreciate assistance) and patient's spouse, daughter Deon Vázquez, son Armando Ko and other daughter (name unknown) via telephone. Patient's medical condition reviewed and discussed options including transition to comfort measures versus hospice. Family decided to transition patient to comfort measures on 7/20/2021 at 1500. Addendum @7480:  Met with family, spouse Servando Khan, daughter, Deon Vázquez and her spouse, daughter, Sara Rios and son, Armando Ko. Family has elected to transition patient to comfort measures and family wishes to be present with patient during the transition. Discussed comfort measures in detail including transition from HFNC to nasal oxygen and use of medications such as Morphine and Ativan to manage symptoms of shortness of breath, breathlessness, pain and anxiety. Family expressed understanding and all questions answered to the best of our ability. Comfort order set placed. Physician order for scope of treatment (POST) form signed by spouse for DNR/DNI, comfort measures and no feeding tube. Copy placed on chart for scanning. Palliative medicine will continue to follow.       1. 7/16/2021 goals of care Ms Marilou Zhang seen along with Ms Esther, RN. She did not open her eyes to her name or light touch for us this am. Back on high flow due to sats in high 80's and increased WOB. Very frail and thin appearing. Spoke with her daughter Gabriel Yo via phone. Gabriel Yo helps her father with medical decisions and understanding overall medical condition. She tells us they do wish for patient to return home with hospice services as they understand her MS is at end stage. Care decisions discussed at some length, including continuing same treatment, IVAB, weaning high flow as able in hopes her acute illness will improve and allow her to return home a bit stronger vs moving to comfort now with quick transition home with hospice, understanding high flow would need to be weaned. Yelena shared her father remains hopeful Denae Wyatt will be able to be treated for the acute illness and feels she is improving a bit. At this time they wish to continue treatment. We shared with Gabriel Yo it is possible despite current aggressive treatment Denae Wyatt will continue decline, if this happens recommended to Gabriel Yo to consider and discuss  with her father to move Denae Wyatt to comfort measures. Yelena voiced her understanding. We offered support to Gabriel Yo in this most difficult time. She is aware due to increased WOB and lower oxygen sats her mother was placed back on high flow. Current goals of care DNR/DNI limited interventions with continuation of current treatment. ( please see below for previous notes per palliative team)     2. Encounter for palliative care/goals of care - een at bedside along with MsRosa Maria Daniella Paul. Ms. Kelby Guevara is lying in bed with her eyes open and nonrebreather mask in place. Patient seems to blink in response to questions and verbal stimuli, nonverbal.  Appears comfortable, no facial grimacing observed. Follow-up conversation today at [phone] with daughter, Becky Xavier in ICU waiting room.   She shared that family is not yet ready to make any decision with regard to comfort measures however they are considering the option of home hospice. Hospice consult placed on 7/14/2021. Family awaiting contact. Yelena shared that she will be talking with her father (patient's ) this evening about the option of home hospice for patient. Supportive listening provided as Lou Hernandez shared information about her mother. Palliative medicine contact information given to Lou Hernandez who expressed appreciation for the follow-up discussion and information. Goals of care are DNR/DNI. Palliative medicine will continue to follow and support family as they make healthcare decisions for patient. 3. Sepsis -primary team managing, IV antibiotics    4. Seizure -primary team managing, neuro consult, head CT and EEG pending, IV Keppra and as needed Ativan    5. Debility - PPS 30 which indicates an overall poor prognosis, patient lives at home with her  and has a caregiver that comes in to assist daily. Patient is bed bound and wheelchair-bound at home. Followed by home palliative with [place]. 6. Initial consult note routed to primary continuity provider    7. Communicated plan of care with: Palliative IDT, daughter      Advance Care Planning:  [] The [facility] Interdisciplinary Team has updated the ACP Navigator with [address] and Patient Capacity    Primary Decision [address] ([address]):   Primary Decision Maker: Gretel Friend - Spouse - [phone]    Primary Decision Maker: Siva Bowden - Daughter - [phone]    Medical Interventions: Comfort measures   Artificially Administered Nutrition: No feeding tube     As far as possible, the palliative care team has discussed with patient / health care proxy about goals of care / treatment preferences for patient.          HISTORY:     History obtained from: Chart and daughterYelena Das    Principal Problem:    Acute respiratory failure with hypoxia ([facility] [address] ()    Active Problems: Sepsis ([facility] [address] (7/14/2021)      MS (multiple sclerosis) ([facility] [address] ()      Seizure ([facility] [address] ()      Pressure injury of sacral region, stage 4 (HCC) ()      Elevated troponin ()      Lactic acidosis ()      Bedridden (7/14/2021)      Pneumonia (7/14/2021)      Cachexia ([facility] [address] (7/14/2021)      Severe protein-calorie malnutrition ([facility] [address] (7/15/2021)      Hypokalemia (7/15/2021)      Encounter for palliative care ()      Debility ()      Past Medical History:   Diagnosis Date    MS (multiple sclerosis) ([facility] [address]       No past surgical history on file. No family history on file. History reviewed, no pertinent family history.   Social History     Tobacco Use    Smoking status: Not on file   Substance Use Topics    Alcohol use: Not on file     Allergies   Allergen Reactions    Pcn [Penicillins] Unknown (comments)      Current Facility-Administered Medications   Medication Dose Route Frequency    LORazepam (INTENSOL) 2 mg/mL oral concentrate 0.5 mg  0.5 mg SubLINGual Q3H PRN    morphine (ROXANOL) 100 mg/5 mL (20 mg/mL) concentrated solution 5 mg  5 mg SubLINGual Q1H PRN    morphine (ROXANOL) 100 mg/5 mL (20 mg/mL) concentrated solution 5 mg  5 mg SubLINGual Q6H    scopolamine (TRANSDERM-SCOP) 1 mg over 3 days 1 Patch  1 Patch TransDERmal Q72H PRN    hyoscyamine SL (LEVSIN/SL) tablet 0.125 mg  0.125 mg SubLINGual Q4H PRN    bisacodyL (DULCOLAX) suppository 10 mg  10 mg Rectal DAILY PRN    cloNIDine (CATAPRES) 0.1 mg/24 hr patch 1 Patch  1 Patch TransDERmal Q7D    baclofen intrathecal soln (Patient Supplied)  6.25 mcg/hr Intrathecal CONTINUOUS    sodium chloride (NS) flush 5-40 mL  5-40 mL IntraVENous Q8H    sodium chloride (NS) flush 5-40 mL  5-40 mL IntraVENous PRN    acetaminophen (TYLENOL) tablet 650 mg  650 mg Oral Q6H PRN    Or    acetaminophen (TYLENOL) suppository 650 mg  650 mg Rectal Q6H PRN    ondansetron (ZOFRAN ODT) tablet 4 mg  4 mg Oral Q8H PRN    Or    ondansetron (ZOFRAN) injection 4 mg  4 mg IntraVENous Q6H PRN    levETIRAcetam (KEPPRA) 1,000 mg in 0.9% sodium chloride 100 mL IVPB  1,000 mg IntraVENous Q12H    albuterol-ipratropium (DUO-NEB) 2.5 MG-0.5 MG/3 ML  3 mL Nebulization Q4H PRN          Clinical Pain Assessment (nonverbal scale for nonverbal patients): Clinical Pain Assessment  Severity: 0     Activity (Movement): Lying quietly, normal position    Duration: for how long has pt been experiencing pain (e.g., 2 days, 1 month, years)  Frequency: how often pain is an issue (e.g., several times per day, once every few days, constant)     FUNCTIONAL ASSESSMENT:     Palliative Performance Scale (PPS):  PPS: 10    ECOG  ECOG Status : Completely disabled     PSYCHOSOCIAL/SPIRITUAL SCREENING:      Any spiritual / Church concerns: 7/22/2021unable to assess  [] Yes /  [] No    Caregiver Burnout:  [] Yes /  [] No /  [x] No Caregiver Present      Anticipatory grief assessment: 7/22/2021unable to assess  [] Normal  / [] Maladaptive        REVIEW OF SYSTEMS:     Systems: constitutional, ears/nose/mouth/throat, respiratory, gastrointestinal, genitourinary, musculoskeletal, integumentary, neurologic, psychiatric, endocrine. Positive findings noted below. Modified ESAS Completed by: provider           Pain: 0           Dyspnea: 1           Stool Occurrence(s): 0   Positive and pertinent negative findings in ROS are noted above in HPI. The following systems were [] reviewed / [x] unable to be reviewed as noted in HPI  Other findings are noted below. PHYSICAL EXAM:     Constitutional: frail appearing female lying in bed, not alert, not responsive, respirations shallow, on nasal oxygen at 2 L, does not appear entirely comfortable  Eyes: semi-closed, not tracking  Cardiovascular: tachycardia  Respiratory: Mild tachypnea, nasal oxygen at 2 L, slight gurgling  Last bowel movement: None recorded  Skin: warm, dry  Neurologic: Not alert, moved head slightly in response to name call      Other:   Wt Readings from Last 3 Encounters:   07/20/21 37.2 kg (82 lb)     Blood pressure 134/63, pulse (!) 143, temperature 98.4 °F (36.9 °C), resp. rate 22, height 5' 3\" (1.6 m), weight 37.2 kg (82 lb), SpO2 96 %. Pain:  Pain Scale 1: FLACC                         LAB AND IMAGING FINDINGS:     Lab Results   Component Value Date/Time    WBC 19.6 (H) 07/20/2021 03:10 AM    HGB 8.6 (L) 07/20/2021 03:10 AM    PLATELET 087 76/89/5672 03:10 AM     Lab Results   Component Value Date/Time    Sodium 140 07/20/2021 03:10 AM    Potassium 4.4 07/20/2021 03:10 AM    Chloride 111 07/20/2021 03:10 AM    CO2 22 07/20/2021 03:10 AM    BUN 7 07/20/2021 03:10 AM    Creatinine 0.34 (L) 07/20/2021 03:10 AM    Calcium 7.8 (L) 07/20/2021 03:10 AM    Magnesium 1.9 07/20/2021 03:10 AM    Phosphorus 1.8 (L) 07/20/2021 03:10 AM      Lab Results   Component Value Date/Time    Alk. phosphatase 115 07/14/2021 03:35 PM    Protein, total 9.5 (H) 07/14/2021 03:35 PM    Albumin 2.7 (L) 07/14/2021 03:35 PM    Globulin 6.8 (H) 07/14/2021 03:35 PM     Lab Results   Component Value Date/Time    INR 1.3 (H) 07/14/2021 03:35 PM    Prothrombin time 16.0 (H) 07/14/2021 03:35 PM      No results found for: IRON, FE, TIBC, IBCT, PSAT, FERR   No results found for: PH, PCO2, PO2  No components found for: [place]   Lab Results   Component Value Date/Time    CK 37 07/15/2021 09:30 AM    CK - MB 2.7 07/15/2021 09:30 AM              Total time:  35 minutes     > 50% counseling / coordination:  Time spent in direct consultation with the patient, medical team, and family     Prolonged service was provided for  []30 min   []75 min in face to face time in the presence of the patient, spent as noted above. Time Start:   Time End:      Disclaimer: Sections of this note are dictated using utilizing voice recognition software, which may have resulted in some phonetic based errors in grammar and contents.  Even though attempts were made to correct all the mistakes, some may have been missed, and remained in the body of the document. If questions arise, please contact our department.

## 2021-07-22 NOTE — PROGRESS NOTES
Bereavement Note:     responded to the death of Sylwia Lazcano, who is a 64 y.o., female, offering Spiritual Care to patient's family, see flow sheets for interventions. Date of Death: 2021    Extended Emergency Contact Information  Primary Emergency Contact: Gabriel Bazzi  Address: [address], [address] Phone: [phone]  Mobile Phone: [phone]  Relation: Spouse  Secondary Emergency Contact: Eric Moyer  [place] Phone: [phone]  Relation: Daughter                 YES      NO  UNKNOWN  Life Net   []        [x]    []   Eye Bank   [] [x] []   Medical Examiner  []        [x]  []   Going to [employer]  [x]        [] []      Autopsy   []        [x]         []   Sympathy Card  [x]        []  Bereavement Materials  [x]        []           Business Card Provided  [x]        []              Home:    Chaplains will continue to follow family and will provide spiritual care as needed.    [address]   [phone] - [place]

## 2021-07-22 NOTE — WOUND CARE
Wound Care Note:    Chart audited for low deandre score, patient with high risk for skin breakdown, no new documented wounds at time of audit. Patient seen several days ago with current wounds assessed and dressed.      Skin Care & Pressure Relief Recommendations  Minimize layers of linen  Pads under patient to optimize support surface  Turn/reposition approximately every 2 hours  Pillow wedges  Manage incontinence   Promote continence; Skin Protective lotion/cream to buttocks and sacrum daily and as needed with incontinence care  Offload heels pillows    Consult wound care if any wounds/ skin care needs noted during admission

## 2021-07-22 NOTE — PROGRESS NOTES
Care manager called and spoke with patient's daughter Jeri Healy ([phone]) as follow up regarding patient disposition; shared information documented earlier today by Palliative Medicine RN regarding patient not being stable to transport home; per daughter, additional family members are arriving today and requested if more than 4 can visit for berevement; have suggested that when appropriate, nursing supervisor be called to make determination/approval of change in visitation numbers. Care manager will continue to support as needed.

## 2021-07-22 NOTE — PROGRESS NOTES
Problem: Pressure Injury - Risk of  Goal: *Prevention of pressure injury  Description: Document Alvin Scale and appropriate interventions in the flowsheet. Outcome: Progressing Towards Goal  Note: Pressure Injury Interventions:  Sensory Interventions: Avoid rigorous massage over bony prominences    Moisture Interventions: Absorbent underpads    Activity Interventions: Pressure redistribution bed/mattress(bed type)    Mobility Interventions: HOB 30 degrees or less    Nutrition Interventions: Document food/fluid/supplement intake    Friction and Shear Interventions: HOB 30 degrees or less                Problem: Falls - Risk of  Goal: *Absence of Falls  Description: Document Lanette Fall Risk and appropriate interventions in the flowsheet.   Outcome: Progressing Towards Goal  Note: Fall Risk Interventions:       Mentation Interventions: Adequate sleep, hydration, pain control    Medication Interventions: Bed/chair exit alarm    Elimination Interventions: Bed/chair exit alarm

## 2021-07-22 NOTE — PROGRESS NOTES
[address] made this nurse aware patient . Family at the bedside. 1700  aware and in route. Nursing supervisor aware. [address]. Koby Beltran made aware. [address] informed, per lifenet medically unsuitable. Awaiting eye bank call. 1803 Waiting eye bank call, supervisor aware. Per supervisor she will call and follow up with eye bank.

## 2021-07-22 NOTE — PROGRESS NOTES
Palliative Medicine    Palliative Medicine team Nabeel London NP and Zuleyka Edge RN rounded on comfort care patient x 2. During our first visit the pt did open her eyes to verbal stimuli. She was able to nod her head to one of our questions. Pt still has a significant amount of secretions. Pt also slightly tachypneic with shallow breathing. Pt does not appear completely comfortable. Advised BSRN to administer doses of Morphine, Ativan, and Robinul. Re-rounded on patient after BSRN administered medications for comfort. Pt's breathing had eased but was still shallow. This time her eyes had a far off gaze and did not acknowledge our presence. Pt appeared comfortable. Pt remains DNR/DNI on comfort measures. Potential dispo plan is unknown. I believe that pt is nearing the end of her life and is imminent. I do not believe she would survive transport if we were to attempt to get her home with hospice. Thank you for the Palliative Medicine consult and allowing us to participate in the care of Mrs. Thomas Yañez. Will continue to monitor and provide support.     Zuleyka Edge RN, BSN  Palliative Medicine Inpatient RN  [facility]  Palliative COPE Line: [phone])

## 2021-09-08 NOTE — PROGRESS NOTES
Physician Progress Note      PATIENT:               Sridhar Starr  CSN #:                  [id number]  :                       1960  ADMIT DATE:       2021 3:24 PM  Liz Barajas DATE:        2021 4:48 PM  RESPONDING  PROVIDER #:        Eugenia MENDEZ MD          QUERY TEXT:    Pt admitted with Severe sepsis and aspiration pneumonia. Pt. noted to have end stage multiple sclerosis and is described as bedridden. If possible, please document in the progress notes and/or discharge summary if you are treating and/or evaluating any of the following: The medical record reflects the following:  Risk Factors: Multiple Sclerosis  Clinical Indicators:  ER physician:  64 y.o. female, with a past medical history significant for MS in her end-stage is progressively worsened to the point she is bedbound  Per the H&P: MS: Bedridden almost 1 year  Per Pulmonary consult: Pt is bed bound at home from [address]; feeds orally, able to smile back and may say \"hi\" back once in while otherwise non-verbal,  Patient noted to have sacral decubitus ulcer cachexia, BMI 14.5, and severe protein calorie malnutrition  Treatment: Requires total care by nursing, palliative care consult, family opted for comfort measures only    Functional quadriplegia (or quadriparesis) is defined as the complete inability to move due to severe disability or frailty caused by another medical condition without physical injury or damage to the brain or spinal cord.    Source: https://acphospitalist.org    Estevan Mueller RN, BSN, [address] [place] [address]  [phone]  Options provided:  -- Functional quadriplegia POA  -- Complete immobility due to severe physical disability or frailty POA  -- Severe debility/impaired mobility POA  -- Other - I will add my own diagnosis  -- Disagree - Not applicable / Not valid  -- Disagree - Clinically unable to determine / Unknown  -- Refer to Clinical Documentation Reviewer    PROVIDER RESPONSE TEXT:    This patient has complete immobility due to severe physical disability or frailty POA.     Query created by: Mirtha Mary on 9/7/2021 4:06 PM      Electronically signed by:  Nilson Rincon MD 9/8/2021 3:36 PM

## 2022-07-02 ASSESSMENT — KERATOMETRY
OS_K2POWER_DIOPTERS: 46.50
OS_AXISANGLE_DEGREES: 65
OD_K1POWER_DIOPTERS: 46.00
OD_AXISANGLE2_DEGREES: 108
OD_K1POWER_DIOPTERS: 46.25
OD_AXISANGLE2_DEGREES: 110
OS_AXISANGLE2_DEGREES: 155
OS_AXISANGLE_DEGREES: 150
OS_K1POWER_DIOPTERS: 47.25
OS_K2POWER_DIOPTERS: 47.25
OS_K1POWER_DIOPTERS: 46.50
OD_K2POWER_DIOPTERS: 47.00
OD_AXISANGLE_DEGREES: 18
OS_AXISANGLE2_DEGREES: 60
OD_K2POWER_DIOPTERS: 47.25
OD_AXISANGLE_DEGREES: 20

## 2022-07-02 ASSESSMENT — TONOMETRY
OS_IOP_MMHG: 19
OD_IOP_MMHG: 20
OD_IOP_MMHG: 21
OS_IOP_MMHG: 21

## 2022-07-02 ASSESSMENT — VISUAL ACUITY
OS_CC: J3
OD_CC: <J7
OD_CC: CF5
OS_CC: 20/20

## 2024-02-08 ENCOUNTER — NEW PATIENT COMPREHENSIVE (OUTPATIENT)
Dept: URBAN - METROPOLITAN AREA CLINIC 38 | Facility: CLINIC | Age: 64
End: 2024-02-08

## 2024-02-08 DIAGNOSIS — H43.811: ICD-10-CM

## 2024-02-08 DIAGNOSIS — H50.111: ICD-10-CM

## 2024-02-08 PROCEDURE — 92015 DETERMINE REFRACTIVE STATE: CPT

## 2024-02-08 PROCEDURE — 92201 OPSCPY EXTND RTA DRAW UNI/BI: CPT

## 2024-02-08 PROCEDURE — 99203 OFFICE O/P NEW LOW 30 MIN: CPT

## 2024-02-08 ASSESSMENT — VISUAL ACUITY
OD_CC: <J7
OD_CC: CF 4FT
OD_PH: 20/400
OS_CC: J2
OS_CC: 20/25

## 2024-02-08 ASSESSMENT — KERATOMETRY
OD_K2POWER_DIOPTERS: 47.00
OS_AXISANGLE2_DEGREES: 155
OS_AXISANGLE_DEGREES: 65
OD_AXISANGLE2_DEGREES: 108
OD_AXISANGLE_DEGREES: 18
OD_K1POWER_DIOPTERS: 46.00
OS_K1POWER_DIOPTERS: 46.50
OS_K2POWER_DIOPTERS: 47.25

## 2024-02-08 ASSESSMENT — TONOMETRY
OD_IOP_MMHG: 14
OS_IOP_MMHG: 15

## 2024-03-06 ENCOUNTER — FOLLOW UP (OUTPATIENT)
Dept: URBAN - METROPOLITAN AREA CLINIC 38 | Facility: CLINIC | Age: 64
End: 2024-03-06

## 2024-03-06 DIAGNOSIS — H43.811: ICD-10-CM

## 2024-03-06 PROCEDURE — 92201 OPSCPY EXTND RTA DRAW UNI/BI: CPT

## 2024-03-06 PROCEDURE — 92012 INTRM OPH EXAM EST PATIENT: CPT

## 2024-03-06 ASSESSMENT — KERATOMETRY
OD_K1POWER_DIOPTERS: 46.00
OS_AXISANGLE2_DEGREES: 155
OS_K2POWER_DIOPTERS: 47.25
OD_K2POWER_DIOPTERS: 47.00
OD_AXISANGLE_DEGREES: 18
OS_K1POWER_DIOPTERS: 46.50
OS_AXISANGLE_DEGREES: 65
OD_AXISANGLE2_DEGREES: 108

## 2024-03-06 ASSESSMENT — VISUAL ACUITY
OS_CC: 20/20
OD_CC: CF 3FT

## 2024-03-06 ASSESSMENT — TONOMETRY
OD_IOP_MMHG: 23
OS_IOP_MMHG: 19

## 2024-05-23 NOTE — PROGRESS NOTES
1900: Bedside and Verbal shift change report given to Helen Gutierres RN (oncoming nurse) by Divya Alicia RN (offgoing nurse). Report included the following information SBAR, Kardex, Intake/Output, MAR, Recent Results, Med Rec Status, Cardiac Rhythm Sinus TAch and Alarm Parameters . 2000: Shift assessment completed. Pt tolerating 6L NC with SPO2 - 92% at this time. 2110: Desaturation of SPO2 - 88% noted. Attempted to apply NRB, pt did not tolerate, pulled mask off, grabbing at bag. RT requested at bedside for HFNC. RT at bedside, HFNC applied, pt toleration noted. HFNC - FiO2 - 85%; 45L.     0000: Reassessment completed. 0400: Reassessment completed. 3855: Phlebotomy at bedside collecting blood specimen. 0720: Bedside and Verbal shift change report given to Divya Alicia RN (oncoming nurse) by Helen Gutierres RN (offgoing nurse). Report included the following information SBAR, Kardex, Intake/Output, MAR, Recent Results, Med Rec Status, Cardiac Rhythm Sinus Tach  and Alarm Parameters . No